# Patient Record
Sex: FEMALE | Race: WHITE | Employment: OTHER | URBAN - METROPOLITAN AREA
[De-identification: names, ages, dates, MRNs, and addresses within clinical notes are randomized per-mention and may not be internally consistent; named-entity substitution may affect disease eponyms.]

---

## 2017-01-13 ENCOUNTER — TELEPHONE (OUTPATIENT)
Dept: INTERNAL MEDICINE CLINIC | Age: 37
End: 2017-01-13

## 2017-01-13 RX ORDER — ARIPIPRAZOLE 15 MG/1
TABLET ORAL
Qty: 90 TABLET | Refills: 1 | Status: SHIPPED | OUTPATIENT
Start: 2017-01-13 | End: 2017-08-09 | Stop reason: SDUPTHER

## 2017-01-30 ENCOUNTER — OFFICE VISIT (OUTPATIENT)
Dept: INTERNAL MEDICINE CLINIC | Age: 37
End: 2017-01-30

## 2017-01-30 VITALS
DIASTOLIC BLOOD PRESSURE: 84 MMHG | BODY MASS INDEX: 43.16 KG/M2 | WEIGHT: 252.8 LBS | HEART RATE: 80 BPM | SYSTOLIC BLOOD PRESSURE: 120 MMHG | HEIGHT: 64 IN

## 2017-01-30 DIAGNOSIS — G44.029 CHRONIC CLUSTER HEADACHE, NOT INTRACTABLE: ICD-10-CM

## 2017-01-30 DIAGNOSIS — F31.76 BIPOLAR DISORDER, IN FULL REMISSION, MOST RECENT EPISODE DEPRESSED (HCC): ICD-10-CM

## 2017-01-30 DIAGNOSIS — R73.09 ABNORMAL GLUCOSE: ICD-10-CM

## 2017-01-30 DIAGNOSIS — I10 HTN (HYPERTENSION), BENIGN: Primary | ICD-10-CM

## 2017-01-30 PROCEDURE — 1036F TOBACCO NON-USER: CPT | Performed by: INTERNAL MEDICINE

## 2017-01-30 PROCEDURE — 99214 OFFICE O/P EST MOD 30 MIN: CPT | Performed by: INTERNAL MEDICINE

## 2017-01-30 PROCEDURE — G8427 DOCREV CUR MEDS BY ELIG CLIN: HCPCS | Performed by: INTERNAL MEDICINE

## 2017-01-30 PROCEDURE — G8419 CALC BMI OUT NRM PARAM NOF/U: HCPCS | Performed by: INTERNAL MEDICINE

## 2017-01-30 PROCEDURE — G8484 FLU IMMUNIZE NO ADMIN: HCPCS | Performed by: INTERNAL MEDICINE

## 2017-01-31 ENCOUNTER — TELEPHONE (OUTPATIENT)
Dept: INTERNAL MEDICINE CLINIC | Age: 37
End: 2017-01-31

## 2017-01-31 LAB
ESTIMATED AVERAGE GLUCOSE: 125.5 MG/DL
HBA1C MFR BLD: 6 %

## 2017-02-17 ENCOUNTER — OFFICE VISIT (OUTPATIENT)
Dept: INTERNAL MEDICINE CLINIC | Age: 37
End: 2017-02-17

## 2017-02-17 VITALS
BODY MASS INDEX: 43.29 KG/M2 | WEIGHT: 253.6 LBS | DIASTOLIC BLOOD PRESSURE: 82 MMHG | HEIGHT: 64 IN | SYSTOLIC BLOOD PRESSURE: 118 MMHG | HEART RATE: 64 BPM

## 2017-02-17 DIAGNOSIS — M54.50 ACUTE BILATERAL LOW BACK PAIN WITHOUT SCIATICA: Primary | ICD-10-CM

## 2017-02-17 LAB
BILIRUBIN URINE: NEGATIVE
BLOOD, URINE: NEGATIVE
CLARITY: CLEAR
COLOR: YELLOW
GLUCOSE URINE: NEGATIVE MG/DL
KETONES, URINE: NEGATIVE MG/DL
LEUKOCYTE ESTERASE, URINE: NEGATIVE
MICROSCOPIC EXAMINATION: NORMAL
NITRITE, URINE: NEGATIVE
PH UA: 5
PROTEIN UA: NEGATIVE MG/DL
SPECIFIC GRAVITY UA: 1.03
URINE REFLEX TO CULTURE: NORMAL
URINE TYPE: NORMAL
UROBILINOGEN, URINE: 0.2 E.U./DL

## 2017-02-17 PROCEDURE — G8484 FLU IMMUNIZE NO ADMIN: HCPCS | Performed by: NURSE PRACTITIONER

## 2017-02-17 PROCEDURE — G8417 CALC BMI ABV UP PARAM F/U: HCPCS | Performed by: NURSE PRACTITIONER

## 2017-02-17 PROCEDURE — 1036F TOBACCO NON-USER: CPT | Performed by: NURSE PRACTITIONER

## 2017-02-17 PROCEDURE — G8427 DOCREV CUR MEDS BY ELIG CLIN: HCPCS | Performed by: NURSE PRACTITIONER

## 2017-02-17 PROCEDURE — 99213 OFFICE O/P EST LOW 20 MIN: CPT | Performed by: NURSE PRACTITIONER

## 2017-02-17 RX ORDER — NAPROXEN 500 MG/1
500 TABLET ORAL 2 TIMES DAILY PRN
Qty: 60 TABLET | Refills: 0 | Status: SHIPPED | OUTPATIENT
Start: 2017-02-17 | End: 2017-03-19

## 2017-02-17 RX ORDER — CYCLOBENZAPRINE HCL 5 MG
5 TABLET ORAL NIGHTLY PRN
Qty: 10 TABLET | Refills: 0 | Status: SHIPPED | OUTPATIENT
Start: 2017-02-17 | End: 2017-02-27

## 2017-02-17 ASSESSMENT — ENCOUNTER SYMPTOMS
BACK PAIN: 1
GASTROINTESTINAL NEGATIVE: 1

## 2017-06-19 RX ORDER — METOPROLOL TARTRATE 50 MG/1
75 TABLET, FILM COATED ORAL 2 TIMES DAILY
Qty: 90 TABLET | Refills: 5 | Status: SHIPPED | OUTPATIENT
Start: 2017-06-19 | End: 2018-10-23 | Stop reason: ALTCHOICE

## 2018-03-27 RX ORDER — ARIPIPRAZOLE 15 MG/1
TABLET ORAL
Qty: 90 TABLET | Refills: 0 | OUTPATIENT
Start: 2018-03-27

## 2018-06-13 ENCOUNTER — OFFICE VISIT (OUTPATIENT)
Dept: ENT CLINIC | Age: 38
End: 2018-06-13

## 2018-06-13 VITALS — DIASTOLIC BLOOD PRESSURE: 84 MMHG | SYSTOLIC BLOOD PRESSURE: 120 MMHG

## 2018-06-13 DIAGNOSIS — H69.93 DISORDER OF BOTH EUSTACHIAN TUBES: ICD-10-CM

## 2018-06-13 DIAGNOSIS — K21.9 LARYNGOPHARYNGEAL REFLUX DISEASE: ICD-10-CM

## 2018-06-13 DIAGNOSIS — H60.8X3 CHRONIC ECZEMATOUS OTITIS EXTERNA OF BOTH EARS: Primary | ICD-10-CM

## 2018-06-13 DIAGNOSIS — J30.9 ALLERGIC SINUSITIS: ICD-10-CM

## 2018-06-13 PROCEDURE — 99203 OFFICE O/P NEW LOW 30 MIN: CPT | Performed by: OTOLARYNGOLOGY

## 2018-06-13 PROCEDURE — G8427 DOCREV CUR MEDS BY ELIG CLIN: HCPCS | Performed by: OTOLARYNGOLOGY

## 2018-06-13 PROCEDURE — 1036F TOBACCO NON-USER: CPT | Performed by: OTOLARYNGOLOGY

## 2018-06-13 PROCEDURE — G8421 BMI NOT CALCULATED: HCPCS | Performed by: OTOLARYNGOLOGY

## 2018-06-13 RX ORDER — VENLAFAXINE 75 MG/1
75 TABLET ORAL 2 TIMES DAILY
COMMUNITY
End: 2018-08-29

## 2018-06-13 RX ORDER — IPRATROPIUM BROMIDE AND ALBUTEROL SULFATE 2.5; .5 MG/3ML; MG/3ML
1 SOLUTION RESPIRATORY (INHALATION) EVERY 4 HOURS
COMMUNITY
End: 2018-10-23 | Stop reason: ALTCHOICE

## 2018-06-13 RX ORDER — OMEPRAZOLE 40 MG/1
40 CAPSULE, DELAYED RELEASE ORAL DAILY
Qty: 30 CAPSULE | Refills: 1 | Status: SHIPPED | OUTPATIENT
Start: 2018-06-13 | End: 2018-10-23 | Stop reason: ALTCHOICE

## 2018-06-13 RX ORDER — MONTELUKAST SODIUM 10 MG/1
10 TABLET ORAL NIGHTLY
Qty: 15 TABLET | Refills: 1 | Status: SHIPPED | OUTPATIENT
Start: 2018-06-13 | End: 2018-08-29

## 2018-06-13 RX ORDER — FLUTICASONE PROPIONATE 50 MCG
1 SPRAY, SUSPENSION (ML) NASAL DAILY
COMMUNITY
End: 2018-10-23 | Stop reason: ALTCHOICE

## 2018-06-13 RX ORDER — TRIAMCINOLONE ACETONIDE 1 MG/G
CREAM TOPICAL
Qty: 15 G | Refills: 0 | Status: SHIPPED | OUTPATIENT
Start: 2018-06-13 | End: 2018-08-29

## 2018-06-13 RX ORDER — AMITRIPTYLINE HYDROCHLORIDE 75 MG/1
75 TABLET, FILM COATED ORAL NIGHTLY
COMMUNITY
End: 2018-10-23 | Stop reason: ALTCHOICE

## 2018-06-13 RX ORDER — GUAIFENESIN 600 MG/1
1200 TABLET, EXTENDED RELEASE ORAL 2 TIMES DAILY
COMMUNITY
End: 2018-08-29

## 2018-06-13 RX ORDER — TRIAMCINOLONE ACETONIDE 1 MG/G
CREAM TOPICAL 2 TIMES DAILY
COMMUNITY
End: 2018-08-29

## 2018-06-13 RX ORDER — MELOXICAM 15 MG/1
15 TABLET ORAL DAILY
COMMUNITY
End: 2018-08-29

## 2018-06-13 RX ORDER — FERROUS SULFATE 325(65) MG
325 TABLET ORAL
COMMUNITY
End: 2018-10-23 | Stop reason: ALTCHOICE

## 2018-06-13 ASSESSMENT — ENCOUNTER SYMPTOMS
FACIAL SWELLING: 0
SINUS PRESSURE: 0
RHINORRHEA: 0
SORE THROAT: 1
APNEA: 1
SINUS PAIN: 0
VOICE CHANGE: 0
ALLERGIC/IMMUNOLOGIC NEGATIVE: 1
EYES NEGATIVE: 1
TROUBLE SWALLOWING: 0

## 2018-08-29 ENCOUNTER — OFFICE VISIT (OUTPATIENT)
Dept: FAMILY MEDICINE CLINIC | Age: 38
End: 2018-08-29

## 2018-08-29 VITALS
WEIGHT: 242.8 LBS | DIASTOLIC BLOOD PRESSURE: 80 MMHG | TEMPERATURE: 97.8 F | HEART RATE: 80 BPM | BODY MASS INDEX: 41.45 KG/M2 | HEIGHT: 64 IN | SYSTOLIC BLOOD PRESSURE: 124 MMHG | RESPIRATION RATE: 17 BRPM | OXYGEN SATURATION: 98 %

## 2018-08-29 DIAGNOSIS — F32.A ANXIETY AND DEPRESSION: Primary | ICD-10-CM

## 2018-08-29 DIAGNOSIS — F41.9 ANXIETY AND DEPRESSION: Primary | ICD-10-CM

## 2018-08-29 DIAGNOSIS — Z76.89 ENCOUNTER TO ESTABLISH CARE: ICD-10-CM

## 2018-08-29 DIAGNOSIS — M54.50 ACUTE BILATERAL LOW BACK PAIN WITHOUT SCIATICA: ICD-10-CM

## 2018-08-29 PROCEDURE — 99203 OFFICE O/P NEW LOW 30 MIN: CPT | Performed by: CLINICAL NURSE SPECIALIST

## 2018-08-29 PROCEDURE — G8417 CALC BMI ABV UP PARAM F/U: HCPCS | Performed by: CLINICAL NURSE SPECIALIST

## 2018-08-29 PROCEDURE — 1036F TOBACCO NON-USER: CPT | Performed by: CLINICAL NURSE SPECIALIST

## 2018-08-29 PROCEDURE — G8427 DOCREV CUR MEDS BY ELIG CLIN: HCPCS | Performed by: CLINICAL NURSE SPECIALIST

## 2018-08-29 RX ORDER — HYDROXYZINE PAMOATE 25 MG/1
25 CAPSULE ORAL 2 TIMES DAILY PRN
Qty: 30 CAPSULE | Refills: 0 | Status: SHIPPED | OUTPATIENT
Start: 2018-08-29 | End: 2018-09-28

## 2018-08-29 RX ORDER — NAPROXEN 500 MG/1
500 TABLET ORAL 2 TIMES DAILY WITH MEALS
Qty: 60 TABLET | Refills: 0 | Status: SHIPPED | OUTPATIENT
Start: 2018-08-29 | End: 2019-03-22

## 2018-08-29 RX ORDER — CYCLOBENZAPRINE HCL 5 MG
5 TABLET ORAL 2 TIMES DAILY PRN
Qty: 20 TABLET | Refills: 0 | Status: SHIPPED | OUTPATIENT
Start: 2018-08-29 | End: 2018-09-08

## 2018-08-29 ASSESSMENT — ENCOUNTER SYMPTOMS: BACK PAIN: 1

## 2018-08-29 NOTE — PROGRESS NOTES
Smoker    Smokeless tobacco: Never Used    Alcohol use No    Drug use: No    Sexual activity: No     Other Topics Concern    Not on file     Social History Narrative    No narrative on file       Review of Systems   Constitutional: Negative for chills, fever, unexpected weight change and weight loss. Eyes: Negative for visual disturbance. Respiratory: Negative for cough, chest tightness, shortness of breath and wheezing. Cardiovascular: Negative for chest pain and palpitations. Gastrointestinal: Negative for abdominal pain, bowel incontinence, diarrhea, nausea, rectal pain and vomiting. Endocrine: Negative for polydipsia, polyphagia and polyuria. Genitourinary: Negative for bladder incontinence, dysuria and pelvic pain. Musculoskeletal: Positive for back pain. Skin: Negative for rash. Neurological: Negative for tingling, weakness, numbness, headaches and paresthesias. Psychiatric/Behavioral: Positive for dysphoric mood. Negative for self-injury, sleep disturbance and suicidal ideas. The patient is nervous/anxious. OBJECTIVE:    Physical Exam   Constitutional: She is oriented to person, place, and time. She appears well-developed and well-nourished. No distress. HENT:   Head: Normocephalic and atraumatic. Right Ear: External ear normal.   Left Ear: External ear normal.   Nose: Nose normal.   Eyes: Pupils are equal, round, and reactive to light. Conjunctivae are normal.   Neck: Normal range of motion. Neck supple. No tracheal deviation present. Cardiovascular: Normal rate, regular rhythm and normal heart sounds. Pulmonary/Chest: Effort normal. No respiratory distress. She has no wheezes. She has no rales. She exhibits no tenderness. Abdominal: Soft. Bowel sounds are normal. She exhibits no distension and no mass. There is no tenderness. There is no rebound and no guarding. Musculoskeletal: Normal range of motion. She exhibits no edema.    Slight decreased ROM due to discomfort, able to walk on heels and toes, negative straight leg raise, no spinal tenderness with palpation   Neurological: She is alert and oriented to person, place, and time. Skin: Skin is warm and dry. No rash noted. Psychiatric: She has a normal mood and affect. Her behavior is normal.   Nursing note and vitals reviewed. /80   Pulse 80   Temp 97.8 °F (36.6 °C)   Resp 17   Ht 5' 4\" (1.626 m)   Wt 242 lb 12.8 oz (110.1 kg)   LMP 05/29/2018   SpO2 98%   BMI 41.68 kg/m²    BP Readings from Last 3 Encounters:   08/29/18 124/80   06/13/18 120/84   02/17/17 118/82      Wt Readings from Last 3 Encounters:   08/29/18 242 lb 12.8 oz (110.1 kg)   02/17/17 253 lb 9.6 oz (115 kg)   01/30/17 252 lb 12.8 oz (114.7 kg)       ASSESSMENT & PLAN:    1. Anxiety and depression  - hydrOXYzine (VISTARIL) 25 MG capsule; Take 1 capsule by mouth 2 times daily as needed for Anxiety  Dispense: 30 capsule; Refill: 0 (may cause drowsiness)   - Ambulatory referral to Psychiatry    2. Acute bilateral low back pain without sciatica  - naproxen (NAPROSYN) 500 MG tablet; Take 1 tablet by mouth 2 times daily (with meals)  Dispense: 60 tablet; Refill: 0  - Ambulatory referral to Physical Therapy  - XR LUMBAR SPINE (MIN 4 VIEWS); Future  - cyclobenzaprine (FLEXERIL) 5 MG tablet; Take 1 tablet by mouth 2 times daily as needed for Muscle spasms  Dispense: 20 tablet; Refill: 0 (may cause drowsiness)  - Naproxen twice a day, take with food. - No Aleve, Advil, Ibuprofen, Motrin or aspirin while taking the naproxen. - Watch for GI symptoms (heart burn, indigestion, epigastric pain or blood in stool)  - Stretches/exercises given. - Heat or ice, whichever feels better. 3. Encounter to establish care  - Follow up as scheduled with [facility] as scheduled on 9/12/18      Continue current treatment plan.     Current Outpatient Prescriptions   Medication Sig Dispense Refill    hydrOXYzine (VISTARIL) 25 MG capsule Take 1 capsule by mouth 2 times daily as needed for Anxiety 30 capsule 0    naproxen (NAPROSYN) 500 MG tablet Take 1 tablet by mouth 2 times daily (with meals) 60 tablet 0    cyclobenzaprine (FLEXERIL) 5 MG tablet Take 1 tablet by mouth 2 times daily as needed for Muscle spasms 20 tablet 0    amitriptyline (ELAVIL) 75 MG tablet Take 75 mg by mouth nightly      metoprolol tartrate (LOPRESSOR) 50 MG tablet Take 1.5 tablets by mouth 2 times daily 90 tablet 5    albuterol sulfate HFA (PROVENTIL HFA) 108 (90 BASE) MCG/ACT inhaler Inhale 2 puffs into the lungs every 4 hours as needed for Wheezing 1 Inhaler 5    bisacodyl (DULCOLAX) 5 MG EC tablet Take 5 mg by mouth daily as needed for Constipation      ferrous sulfate 325 (65 Fe) MG tablet Take 325 mg by mouth daily (with breakfast)      fluticasone (FLONASE) 50 MCG/ACT nasal spray 1 spray by Nasal route daily      ipratropium-albuterol (DUONEB) 0.5-2.5 (3) MG/3ML SOLN nebulizer solution Inhale 1 vial into the lungs every 4 hours      omeprazole (PRILOSEC) 40 MG delayed release capsule Take 1 capsule by mouth daily Take tablet one hour before evening meal 30 capsule 1     No current facility-administered medications for this visit. Return in about 2 weeks (around 9/12/2018), or if symptoms worsen or fail to improve, for back pain, anxiety, depression, establish care. [place] received counseling on the following healthy behaviors: nutrition, exercise and medication adherence    Discussed use, benefit, and side effects of prescribed medications. Barriers to medication compliance addressed. All patient questions answered. Pt voiced understanding. Call office if experience side effects from medications. Please note that some or all of this record was generated using voice recognition software. If there are any questions about the content of this document, please contact the author as some errors in transcription may have occurred.

## 2018-08-29 NOTE — PATIENT INSTRUCTIONS
Continue current current treatment plan    Vistaril 25 mg twice a day as needed for anxiety, may cause drowsiness    Referral to psychiatry     Lumbar  X ray    Referral to physical therapy    Naproxen twice a day, take with food. No Aleve, Advil, Ibuprofen, Motrin or aspirin while taking the naproxen. Watch for GI symptoms (heart burn, indigestion, epigastric pain or blood in stool)    Stretches/exercises given. Heat or ice, whichever feels better. Flexeril twice a day as needed for muscle spasms, may cause drowsiness    Follow up as scheduled with Zonia Rivera as scheduled on 9/12/18    Patient Education        Anxiety Disorder: Care Instructions  Your Care Instructions    Anxiety is a normal reaction to stress. Difficult situations can cause you to have symptoms such as sweaty palms and a nervous feeling. In an anxiety disorder, the symptoms are far more severe. Constant worry, muscle tension, trouble sleeping, nausea and diarrhea, and other symptoms can make normal daily activities difficult or impossible. These symptoms may occur for no reason, and they can affect your work, school, or social life. Medicines, counseling, and self-care can all help. Follow-up care is a key part of your treatment and safety. Be sure to make and go to all appointments, and call your doctor if you are having problems. It's also a good idea to know your test results and keep a list of the medicines you take. How can you care for yourself at home? · Take medicines exactly as directed. Call your doctor if you think you are having a problem with your medicine. · Go to your counseling sessions and follow-up appointments. · Recognize and accept your anxiety. Then, when you are in a situation that makes you anxious, say to yourself, \"This is not an emergency. I feel uncomfortable, but I am not in danger. I can keep going even if I feel anxious. \"  · Be kind to your body:  ¨ Relieve tension with exercise or a massage. ¨ Get enough rest.  ¨ Avoid alcohol, caffeine, nicotine, and illegal drugs. They can increase your anxiety level and cause sleep problems. ¨ Learn and do relaxation techniques. See below for more about these techniques. · Engage your mind. Get out and do something you enjoy. Go to a funny movie, or take a walk or hike. Plan your day. Having too much or too little to do can make you anxious. · Keep a record of your symptoms. Discuss your fears with a good friend or family member, or join a support group for people with similar problems. Talking to others sometimes relieves stress. · Get involved in social groups, or volunteer to help others. Being alone sometimes makes things seem worse than they are. · Get at least 30 minutes of exercise on most days of the week to relieve stress. Walking is a good choice. You also may want to do other activities, such as running, swimming, cycling, or playing tennis or team sports. Relaxation techniques  Do relaxation exercises 10 to 20 minutes a day. You can play soothing, relaxing music while you do them, if you wish. · Tell others in your house that you are going to do your relaxation exercises. Ask them not to disturb you. · Find a comfortable place, away from all distractions and noise. · Lie down on your back, or sit with your back straight. · Focus on your breathing. Make it slow and steady. · Breathe in through your nose. Breathe out through either your nose or mouth. · Breathe deeply, filling up the area between your navel and your rib cage. Breathe so that your belly goes up and down. · Do not hold your breath. · Breathe like this for 5 to 10 minutes. Notice the feeling of calmness throughout your whole body. As you continue to breathe slowly and deeply, relax by doing the following for another 5 to 10 minutes:  · Tighten and relax each muscle group in your body. You can begin at your toes and work your way up to your head.   · Imagine your muscle instructions adapted under license by [facility] ([facility]). If you have questions about a medical condition or this instruction, always ask your healthcare professional. [place] [address] any warranty or liability for your use of this information. Patient Education        Learning About How to Have a Healthy Back  What causes back pain? Back pain is often caused by overuse, strain, or injury. For example, people often hurt their backs playing sports or working in the yard, being jolted in a car accident, or lifting something too heavy. Aging plays a part too. Your bones and muscles tend to lose strength as you age, which makes injury more likely. The spongy discs between the bones of the spine (vertebrae) may suffer from wear and tear and no longer provide enough cushion between the bones. A disc that bulges or breaks open (herniated disc) can press on nerves, causing back pain. In some people, back pain is the result of arthritis, broken vertebrae caused by bone loss (osteoporosis), illness, or a spine problem. Although most people have back pain at one time or another, there are steps you can take to make it less likely. How can you have a healthy back? Reduce stress on your back through good posture  Slumping or slouching alone may not cause low back pain. But after the back has been strained or injured, bad posture can make pain worse. · Sleep in a position that maintains your back's normal curves and on a mattress that feels comfortable. Sleep on your side with a pillow between your knees, or sleep on your back with a pillow under your knees. These positions can reduce strain on your back. · Stand and sit up straight. \"Good posture\" generally means your ears, shoulders, and hips are in a straight line. · If you must stand for a long time, put one foot on a stool, ledge, or box. Switch feet every now and then.   · Sit in a chair that is low enough to let you place both feet flat on the floor with both knees nearly level with your hips. If your chair or desk is too high, use a footrest to raise your knees. Place a small pillow, a rolled-up towel, or a lumbar roll in the curve of your back if you need extra support. · Try a kneeling chair, which helps tilt your hips forward. This takes pressure off your lower back. · Try sitting on an exercise ball. It can rock from side to side, which helps keep your back loose. · When driving, keep your knees nearly level with your hips. Sit straight, and drive with both hands on the steering wheel. Your arms should be in a slightly bent position. Reduce stress on your back through careful lifting  · Squat down, bending at the hips and knees only. If you need to, put one knee to the floor and extend your other knee in front of you, bent at a right angle (half kneeling). · Press your chest straight forward. This helps keep your upper back straight while keeping a slight arch in your low back. · Hold the load as close to your body as possible, at the level of your belly button (navel). · Use your feet to change direction, taking small steps. · Lead with your hips as you change direction. Keep your shoulders in line with your hips as you move. · Set down your load carefully, squatting with your knees and hips only. Exercise and stretch your back  · Do some exercise on most days of the week, if your doctor says it is okay. You can walk, run, swim, or cycle. · Stretch your back muscles. Here are a few exercises to try:  Roma Lit on your back, and gently pull one bent knee to your chest. Put that foot back on the floor, and then pull the other knee to your chest.  ¨ Do pelvic tilts. Lie on your back with your knees bent. Tighten your stomach muscles. Pull your belly button (navel) in and up toward your ribs. You should feel like your back is pressing to the floor and your hips and pelvis are slightly lifting off the floor.  Hold for 6 seconds while breathing smoothly. ¨ Sit with your back flat against a wall. · Keep your core muscles strong. The muscles of your back, belly (abdomen), and buttocks support your spine. ¨ Pull in your belly and imagine pulling your navel toward your spine. Hold this for 6 seconds, then relax. Remember to keep breathing normally as you tense your muscles. ¨ Do curl-ups. Always do them with your knees bent. Keep your low back on the floor, and curl your shoulders toward your knees using a smooth, slow motion. Keep your arms folded across your chest. If this bothers your neck, try putting your hands behind your neck (not your head), with your elbows spread apart. ¨ Lie on your back with your knees bent and your feet flat on the floor. Tighten your belly muscles, and then push with your feet and raise your buttocks up a few inches. Hold this position 6 seconds as you continue to breathe normally, then lower yourself slowly to the floor. Repeat 8 to 12 times. ¨ If you like group exercise, try Pilates or yoga. These classes have poses that strengthen the core muscles. Lead a healthy lifestyle  · Stay at a healthy weight to avoid strain on your back. · Do not smoke. Smoking increases the risk of osteoporosis, which weakens the spine. If you need help quitting, talk to your doctor about stop-smoking programs and medicines. These can increase your chances of quitting for good. Where can you learn more? Go to https://Appformatoyaeb.Ali. org and sign in to your [employer] account. Enter L315 in the Butter box to learn more about \"Learning About How to Have a Healthy Back. \"     If you do not have an account, please click on the \"Sign Up Now\" link. Current as of: November 29, 2017  Content Version: 11.7  © 9885-4446 [employer]. Care instructions adapted under license by [facility] ([facility]).  If you have questions about a medical condition or this instruction, always ask your healthcare professional. [employer] disclaims any warranty or liability for your use of this information. Patient Education        Back Pain: Care Instructions  Your Care Instructions    Back pain has many possible causes. It is often related to problems with muscles and ligaments of the back. It may also be related to problems with the nerves, discs, or bones of the back. Moving, lifting, standing, sitting, or sleeping in an awkward way can strain the back. Sometimes you don't notice the injury until later. Arthritis is another common cause of back pain. Although it may hurt a lot, back pain usually improves on its own within several weeks. Most people recover in 12 weeks or less. Using good home treatment and being careful not to stress your back can help you feel better sooner. Follow-up care is a key part of your treatment and safety. Be sure to make and go to all appointments, and call your doctor if you are having problems. It's also a good idea to know your test results and keep a list of the medicines you take. How can you care for yourself at home? · Sit or lie in positions that are most comfortable and reduce your pain. Try one of these positions when you lie down:  ¨ Lie on your back with your knees bent and supported by large pillows. ¨ Lie on the floor with your legs on the seat of a sofa or chair. [place] Elder on your side with your knees and hips bent and a pillow between your legs. ¨ Lie on your stomach if it does not make pain worse. · Do not sit up in bed, and avoid soft couches and twisted positions. Bed rest can help relieve pain at first, but it delays healing. Avoid bed rest after the first day of back pain. · Change positions every 30 minutes. If you must sit for long periods of time, take breaks from sitting. Get up and walk around, or lie in a comfortable position. · Try using a heating pad on a low or medium setting for 15 to 20 minutes every 2 or 3 hours.  Try a warm shower in place of one session with the heating pad. · You can also try an ice pack for 10 to 15 minutes every 2 to 3 hours. Put a thin cloth between the ice pack and your skin. · Take pain medicines exactly as directed. ¨ If the doctor gave you a prescription medicine for pain, take it as prescribed. ¨ If you are not taking a prescription pain medicine, ask your doctor if you can take an over-the-counter medicine. · Take short walks several times a day. You can start with 5 to 10 minutes, 3 or 4 times a day, and work up to longer walks. Walk on level surfaces and avoid hills and stairs until your back is better. · Return to work and other activities as soon as you can. Continued rest without activity is usually not good for your back. · To prevent future back pain, do exercises to stretch and strengthen your back and stomach. Learn how to use good posture, safe lifting techniques, and proper body mechanics. When should you call for help? Call your doctor now or seek immediate medical care if:    · You have new or worsening numbness in your legs.     · You have new or worsening weakness in your legs. (This could make it hard to stand up.)     · You lose control of your bladder or bowels.    Watch closely for changes in your health, and be sure to contact your doctor if:    · You have a fever, lose weight, or don't feel well.     · You do not get better as expected. Where can you learn more? Go to https://MentorDOTMepeFrolik.Miralupa. org and sign in to your [employer] account. Enter P058 in the [facility] box to learn more about \"Back Pain: Care Instructions. \"     If you do not have an account, please click on the \"Sign Up Now\" link. Current as of: November 29, 2017  Content Version: 11.7  © 4400-2497 [employer]. Care instructions adapted under license by [facility] ([facility]).  If you have questions about a medical condition or this instruction, always ask your healthcare professional. Shanna Palacio any don't forget to keep walking. · Do self-massage. You can use self-massage to unwind after work or school or to energize yourself in the morning. You can easily massage your feet, hands, or neck. Self-massage works best if you are in comfortable clothes and are sitting or lying in a comfortable position. Use oil or lotion to massage bare skin. · Reduce stress. Back pain can lead to a vicious [place]: Distress about the pain tenses the muscles in your back, which in turn causes more pain. Learn how to relax your mind and your muscles to lower your stress. Where can you learn more? Go to https://chpepiceweb.Odyssey Thera. org and sign in to your [employer] account. Enter S198 in the [employer] box to learn more about \"Learning About Relief for Back Pain. \"     If you do not have an account, please click on the \"Sign Up Now\" link. Current as of: November 29, 2017  Content Version: 11.7  © 6267-6824 [employer]. Care instructions adapted under license by [facility] ([facility]). If you have questions about a medical condition or this instruction, always ask your healthcare professional. [place] [address] any warranty or liability for your use of this information. Patient Education        Back Care and Preventing Injuries: Care Instructions  Your Care Instructions    You can hurt your back doing many everyday activities: lifting a heavy box, bending down to garden, exercising at the gym, and even getting out of bed. But you can keep your back strong and healthy by doing some exercises. You also can follow a few tips for sitting, sleeping, and lifting to avoid hurting your back again. Talk to your doctor before you start an exercise program. Ask for help if you want to learn more about keeping your back healthy. Follow-up care is a key part of your treatment and safety. Be sure to make and go to all appointments, and call your doctor if you are having problems.  It's also a good (abdomen), and buttocks support your spine. ¨ Pull in your belly, and imagine pulling your navel toward your spine. Hold this for 6 seconds, then relax. Remember to keep breathing normally as you tense your muscles. ¨ Do curl-ups. Always do them with your knees bent. Keep your low back on the floor, and curl your shoulders toward your knees using a smooth, slow motion. Keep your arms folded across your chest. If this bothers your neck, try putting your hands behind your neck (not your head), with your elbows spread apart. ¨ Lie on your back with your knees bent and your feet flat on the floor. Tighten your belly muscles, and then push with your feet and raise your buttocks up a few inches. Hold this position 6 seconds as you continue to breathe normally, then lower yourself slowly to the floor. Repeat 8 to 12 times. ¨ If you like group exercise, try Pilates or yoga. These classes have poses that strengthen the core muscles. Protect your back when you sit  · Place a small pillow, a rolled-up towel, or a lumbar roll in the curve of your back if you need extra support. · Sit in a chair that is low enough to let you place both feet flat on the floor with both knees nearly level with your hips. If your chair or desk is too high, use a foot rest to raise your knees. · When driving, keep your knees nearly level with your hips. Sit straight, and drive with both hands on the steering wheel. Your arms should be in a slightly bent position. · Try a kneeling chair, which helps tilt your hips forward. This takes pressure off your lower back. · Try sitting on an exercise ball. It can rock from side to side, which helps keep your back loose. Lift properly  · Squat down, bending at the hips and knees only. If you need to, put one knee to the floor and extend your other knee in front of you, bent at a right angle (half kneeling). · Press your chest straight forward.  This helps keep your upper back straight while keeping a good idea to know your test results and keep a list of the medicines you take. How can you care for yourself at home? Watch for symptoms of depression  The symptoms of depression are often subtle at first. You may think they are caused by your disease rather than depression. Or you may think it is normal to be depressed when you have a chronic disease. If you are depressed you may:  · Feel sad or hopeless. · Feel guilty or worthless. · Not enjoy the things you used to enjoy. · Feel hopeless, as though life is not worth living. · Have trouble thinking or remembering. · Have low energy, and you may not eat or sleep well. · Pull away from others. · Think often about death or killing yourself. (Keep the numbers for these national suicide hotlines: 4-714-412-TALK [1-702.461.5159] and 8-733-LETDIFH [1-956.872.4831]. )  Get treatment  By treating your depression, you can feel more hopeful and have more energy. If you feel better, you may take better care of yourself, so your health may improve. · Talk to your doctor if you have any changes in mood during treatment for your disease. · Ask your doctor for help. Counseling, antidepressant medicine, or a combination of the two can help most people with depression. Often a combination works best. Counseling can also help you cope with having a chronic disease. When should you call for help? Call 911 anytime you think you may need emergency care. For example, call if:    · You feel like hurting yourself or someone else.     · Someone you know has depression and is about to attempt or is attempting suicide.   [facility] your doctor now or seek immediate medical care if:    · You hear voices.     · Someone you know has depression and:  ¨ Starts to give away his or her possessions. ¨ Uses illegal drugs or drinks alcohol heavily. ¨ Talks or writes about death, including writing suicide notes or talking about guns, knives, or pills.   ¨ Starts to spend a lot of time alone.  ¨ Acts very aggressively or suddenly appears calm.    Watch closely for changes in your health, and be sure to contact your doctor if:    · You do not get better as expected. Where can you learn more? Go to https://carloz.Spireon. org and sign in to your [employer] account. Enter Z539 in the PlayPhone box to learn more about \"Depression and Chronic Disease: Care Instructions. \"     If you do not have an account, please click on the \"Sign Up Now\" link. Current as of: December 7, 2017  Content Version: 11.7  © 8897-5674 [employer]. Care instructions adapted under license by [facility] ([facility]). If you have questions about a medical condition or this instruction, always ask your healthcare professional. [address] any warranty or liability for your use of this information. Patient Education        Recovering From Depression: Care Instructions  Your Care Instructions    Taking good care of yourself is important as you recover from depression. In time, your symptoms will fade as your treatment takes hold. Do not give up. Instead, focus your energy on getting better. Your mood will improve. It just takes some time. Focus on things that can help you feel better, such as being with friends and family, eating well, and getting enough rest. But take things slowly. Do not do too much too soon. You will begin to feel better gradually. Follow-up care is a key part of your treatment and safety. Be sure to make and go to all appointments, and call your doctor if you are having problems. It's also a good idea to know your test results and keep a list of the medicines you take. How can you care for yourself at home? Be realistic  · If you have a large task to do, break it up into smaller steps you can handle, and just do what you can. · You may want to put off important decisions until your depression has lifted.  If you have plans that will have a major Where can you learn more? Go to https://chpepiceweb.dev9k. org and sign in to your [employer] account. Enter Y078 in the DuraFizz box to learn more about \"Recovering From Depression: Care Instructions. \"     If you do not have an account, please click on the \"Sign Up Now\" link. Current as of: December 7, 2017  Content Version: 11.7  © [id number] [employer]. Care instructions adapted under license by [facility] ([facility]). If you have questions about a medical condition or this instruction, always ask your healthcare professional. [place] [address] any warranty or liability for your use of this information. Patient Education        Depression Treatment: Care Instructions  Your Care Instructions    Depression is a condition that affects the way you feel, think, and act. It causes symptoms such as low energy, loss of interest in daily activities, and sadness or grouchiness that goes on for a long time. Depression is very common and affects men and women of all ages. Depression is a medical illness caused by changes in the natural chemicals in your brain. It is not a character flaw, and it does not mean that you are a bad or weak person. It does not mean that you are going crazy. It is important to know that depression can be treated. Medicines, counseling, and self-care can all help. Many people do not get help because they are embarrassed or think that they will get over the depression on their own. But some people do not get better without treatment. Follow-up care is a key part of your treatment and safety. Be sure to make and go to all appointments, and call your doctor if you are having problems. It's also a good idea to know your test results and keep a list of the medicines you take. How can you care for yourself at home? Learn about antidepressant medicines  Antidepressant medicines can improve or end the symptoms of depression.  You may need to take the medicine for at least 6 months, and often longer. Keep taking your medicine even if you feel better. If you stop taking it too soon, your symptoms may come back or get worse. You may start to feel better within 1 to 3 weeks of taking antidepressant medicine. But it can take as many as 6 to 8 weeks to see more improvement. Talk to your doctor if you have problems with your medicine or if you do not notice any improvement after 3 weeks. Antidepressants can make you feel tired, dizzy, or nervous. Some people have dry mouth, constipation, headaches, sexual problems, an upset stomach, or diarrhea. Many of these side effects are mild and go away on their own after you take the medicine for a few weeks. Some may last longer. Talk to your doctor if side effects bother you too much. You might be able to try a different medicine. If you are pregnant or breastfeeding, talk to your doctor about what medicines you can take. Learn about counseling  In many cases, counseling can work as well as medicines to treat mild to moderate depression. Counseling is done by licensed mental health providers, such as psychologists, social workers, and some types of nurses. It can be done in one-on-one sessions or in a group setting. Many people find group sessions helpful. Cognitive-behavioral therapy is a type of counseling. In this treatment therapy, you learn how to see and change unhelpful thinking styles that may be adding to your depression. Counseling and medicines often work well when used together. To manage depression  · Be physically active. Getting 30 minutes of exercise each day is good for your body and your mind. Begin slowly if it is hard for you to get started. If you already exercise, keep it up. · Plan something pleasant for yourself every day. Include activities that you have enjoyed in the past.  · Get enough sleep. Talk to your doctor if you have problems sleeping. · Eat a balanced diet.  If you do not feel hungry, secure with your leg raised, try raising the opposite arm straight out in front of you at the same time. Knee-to-chest exercise    1. Lie on your back with your knees bent and your feet flat on the floor. 2. Bring one knee to your chest, keeping the other foot flat on the floor (or keeping the other leg straight, whichever feels better on your lower back). 3. Keep your lower back pressed to the floor. Hold for at least 15 to 30 seconds. 4. Relax, and lower the knee to the starting position. 5. Repeat with the other leg. Repeat 2 to 4 times with each leg. 6. To get more stretch, put your other leg flat on the floor while pulling your knee to your chest.  Curl-ups    1. Lie on the floor on your back with your knees bent at a 90-degree angle. Your feet should be flat on the floor, about 12 inches from your buttocks. 2. Cross your arms over your chest. If this bothers your neck, try putting your hands behind your neck (not your head), with your elbows spread apart. 3. Slowly tighten your belly muscles and raise your shoulder blades off the floor. 4. Keep your head in line with your body, and do not press your chin to your chest.  5. Hold this position for 1 or 2 seconds, then slowly lower yourself back down to the floor. 6. Repeat 8 to 12 times. Pelvic tilt exercise    1. Lie on your back with your knees bent. 2. \"Brace\" your stomach. This means to tighten your muscles by pulling in and imagining your belly button moving toward your spine. You should feel like your back is pressing to the floor and your hips and pelvis are rocking back. 3. Hold for about 6 seconds while you breathe smoothly. 4. Repeat 8 to 12 times. Heel dig bridging    1. Lie on your back with both knees bent and your ankles bent so that only your heels are digging into the floor. Your knees should be bent about 90 degrees.   2. Then push your heels into the floor, squeeze your buttocks, and lift your hips off the floor until your shoulders, hips, and knees are all in a straight line. 3. Hold for about 6 seconds as you continue to breathe normally, and then slowly lower your hips back down to the floor and rest for up to 10 seconds. 4. Do 8 to 12 repetitions. Hamstring stretch in doorway    1. Lie on your back in a doorway, with one leg through the open door. 2. Slide your leg up the wall to straighten your knee. You should feel a gentle stretch down the back of your leg. 3. Hold the stretch for at least 15 to 30 seconds. Do not arch your back, point your toes, or bend either knee. Keep one heel touching the floor and the other heel touching the wall. 4. Repeat with your other leg. 5. Do 2 to 4 times for each leg. Hip flexor stretch    1. Kneel on the floor with one knee bent and one leg behind you. Place your forward knee over your foot. Keep your other knee touching the floor. 2. Slowly push your hips forward until you feel a stretch in the upper thigh of your rear leg. 3. Hold the stretch for at least 15 to 30 seconds. Repeat with your other leg. 4. Do 2 to 4 times on each side. Wall sit    1. Stand with your back 10 to 12 inches away from a wall. 2. Lean into the wall until your back is flat against it. 3. Slowly slide down until your knees are slightly bent, pressing your lower back into the wall. 4. Hold for about 6 seconds, then slide back up the wall. 5. Repeat 8 to 12 times. Follow-up care is a key part of your treatment and safety. Be sure to make and go to all appointments, and call your doctor if you are having problems. It's also a good idea to know your test results and keep a list of the medicines you take. Where can you learn more? Go to https://Enliven Marketing TechnologiespeEpicrisis.Interplay Entertainment. org and sign in to your T-ZONE account. Enter H884 in the Clearhaus box to learn more about \"Low Back Pain: Exercises. \"     If you do not have an account, please click on the \"Sign Up Now\" link.   Current as of: November medicine in your back (especially for pain that involves your legs). If you have chronic low back pain, treatment will help you understand and manage your pain. Treatment may include:  · Staying active. This may include walking or doing back exercises. · Physical therapy. · Medicines. Some of these medicines are also used for other problems, like depression. · Pain management. Your doctor may have you see a pain specialist.  · Counseling. Having chronic pain can be hard. It may help to talk to someone who can help you cope with your pain. Surgery isn't needed for most people. But it may help some types of low back pain. Follow-up care is a key part of your treatment and safety. Be sure to make and go to all appointments, and call your doctor if you are having problems. It's also a good idea to know your test results and keep a list of the medicines you take. When should you call for help? Call 911 anytime you think you may need emergency care. For example, call if:  · You can't move a leg at all. Call your doctor now or seek immediate medical care if:  · You have new or worse symptoms in your legs, belly, or buttocks. Symptoms may include:  ¨ Numbness or tingling. ¨ Weakness. ¨ Pain. · You lose bladder or bowel control. Watch closely for changes in your health, and be sure to contact your doctor if:  · Along with the back pain, you have a fever, lose weight, or don't feel well. · You do not get better as expected. Where can you learn more? Go to https://Patient Communicatorchaparrita.Incident Technologies. org and sign in to your [employer] account. Enter A007 in the The Luxe Nomad box to learn more about \"Learning About Low Back Pain. \"     If you do not have an account, please click on the \"Sign Up Now\" link. Current as of: November 29, 2017  Content Version: 11.7  © 1133-5450 Level Chef, Incorporated. Care instructions adapted under license by [facility] ([facility]).  If you have questions about a medical condition or this Adar IT account. Enter Z667 in the [facility] box to learn more about \"Acute Low Back Pain: Exercises. \"     If you do not have an account, please click on the \"Sign Up Now\" link. Current as of: November 29, 2017  Content Version: 11.7  © 2731-4601 Vaxart. Care instructions adapted under license by [facility] ([facility]). If you have questions about a medical condition or this instruction, always ask your healthcare professional. [address] any warranty or liability for your use of this information. Patient Education        Getting Back to Normal After Low Back Pain: Care Instructions  Your Care Instructions  Almost everyone has low back pain at some time. The good news is that most low back pain will go away in a few days or weeks with some basic self-care. Some people are afraid that doing too much may make their pain worse. In the past, people stayed in bed, thinking this would help their backs. Now doctors think that, in most cases, getting back to your normal activities is good for your back, as long as you avoid doing things that make your pain worse. Follow-up care is a key part of your treatment and safety. Be sure to make and go to all appointments, and call your doctor if you are having problems. It's also a good idea to know your test results and keep a list of the medicines you take. How can you care for yourself at home? Ease back into daily activities  · For the first day or two of pain, take it easy. But as soon as possible, get back to your normal daily life and activities. · Get gentle exercise, such as walking. Movement keeps your spine flexible and helps your muscles stay strong. · If you are an athlete, return to your activity carefully. Choose a low-impact option until your pain is under control. Avoid or change activities that cause pain  · Try to avoid too much bending, heavy lifting, or reaching.  These movements put extra stress on at all.   [facility] your doctor now or seek immediate medical care if:    · You have new or worse symptoms in your legs, belly, or buttocks. Symptoms may include:  ¨ Numbness or tingling. ¨ Weakness. ¨ Pain.     · You lose bladder or bowel control.    Watch closely for changes in your health, and be sure to contact your doctor if:    · You have a fever, lose weight, or don't feel well.     · You are not getting better as expected. Where can you learn more? Go to https://MYFLYpeDotAlign.TweetMeme. org and sign in to your LocalCircles account. Enter I581 in the TinyBytes box to learn more about \"Getting Back to Normal After Low Back Pain: Care Instructions. \"     If you do not have an account, please click on the \"Sign Up Now\" link. Current as of: November 29, 2017  Content Version: 11.7  © 4969-9304 Breakout Commerce, Zenverge. Care instructions adapted under license by [facility] [address] If you have questions about a medical condition or this instruction, always ask your healthcare professional. [place] [address] any warranty or liability for your use of this information.

## 2018-08-30 ASSESSMENT — ENCOUNTER SYMPTOMS
ABDOMINAL PAIN: 0
BOWEL INCONTINENCE: 0
COUGH: 0
VOMITING: 0
DIARRHEA: 0
NAUSEA: 0
WHEEZING: 0
RECTAL PAIN: 0
SHORTNESS OF BREATH: 0
CHEST TIGHTNESS: 0

## 2018-10-01 ENCOUNTER — TELEPHONE (OUTPATIENT)
Dept: INTERNAL MEDICINE CLINIC | Age: 38
End: 2018-10-01

## 2018-10-01 NOTE — TELEPHONE ENCOUNTER
Received phone call from central [facility]. Patient was a patient of . She was last seen in this office by Esperanza Mckinney on 2/17/17. She established care with Sharita Ladd on 8/29/18. Had a NP appointment with Dr. Marielena Santiago on 9/12/18 and she NS. Asking if Lata More would want to see her again. She says that she had to move to take care of her sick sister but now she has moved back to this area.

## 2018-10-23 ENCOUNTER — OFFICE VISIT (OUTPATIENT)
Dept: FAMILY MEDICINE CLINIC | Age: 38
End: 2018-10-23
Payer: MEDICARE

## 2018-10-23 VITALS
SYSTOLIC BLOOD PRESSURE: 124 MMHG | WEIGHT: 241.4 LBS | BODY MASS INDEX: 47.39 KG/M2 | RESPIRATION RATE: 20 BRPM | TEMPERATURE: 98.1 F | HEART RATE: 72 BPM | DIASTOLIC BLOOD PRESSURE: 80 MMHG | OXYGEN SATURATION: 98 % | HEIGHT: 60 IN

## 2018-10-23 DIAGNOSIS — R20.2 PARESTHESIA OF LEFT ARM: ICD-10-CM

## 2018-10-23 DIAGNOSIS — G43.909 MIGRAINE WITHOUT STATUS MIGRAINOSUS, NOT INTRACTABLE, UNSPECIFIED MIGRAINE TYPE: ICD-10-CM

## 2018-10-23 DIAGNOSIS — G56.01 CARPAL TUNNEL SYNDROME OF RIGHT WRIST: Primary | ICD-10-CM

## 2018-10-23 DIAGNOSIS — F31.9 BIPOLAR AFFECTIVE DISORDER, REMISSION STATUS UNSPECIFIED (HCC): ICD-10-CM

## 2018-10-23 PROCEDURE — 99214 OFFICE O/P EST MOD 30 MIN: CPT | Performed by: NURSE PRACTITIONER

## 2018-10-23 PROCEDURE — G8484 FLU IMMUNIZE NO ADMIN: HCPCS | Performed by: NURSE PRACTITIONER

## 2018-10-23 PROCEDURE — 1036F TOBACCO NON-USER: CPT | Performed by: NURSE PRACTITIONER

## 2018-10-23 PROCEDURE — G8427 DOCREV CUR MEDS BY ELIG CLIN: HCPCS | Performed by: NURSE PRACTITIONER

## 2018-10-23 PROCEDURE — G8417 CALC BMI ABV UP PARAM F/U: HCPCS | Performed by: NURSE PRACTITIONER

## 2018-10-23 RX ORDER — ZOLMITRIPTAN 2.5 MG/1
SPRAY, METERED NASAL
Qty: 3 EACH | Refills: 2 | Status: SHIPPED | OUTPATIENT
Start: 2018-10-23 | End: 2018-11-02 | Stop reason: CLARIF

## 2018-10-23 RX ORDER — CYCLOBENZAPRINE HCL 5 MG
5 TABLET ORAL 2 TIMES DAILY PRN
COMMUNITY
Start: 2018-01-17 | End: 2019-03-22 | Stop reason: ALTCHOICE

## 2018-10-23 ASSESSMENT — PATIENT HEALTH QUESTIONNAIRE - PHQ9
SUM OF ALL RESPONSES TO PHQ QUESTIONS 1-9: 2
SUM OF ALL RESPONSES TO PHQ9 QUESTIONS 1 & 2: 2
SUM OF ALL RESPONSES TO PHQ QUESTIONS 1-9: 2
2. FEELING DOWN, DEPRESSED OR HOPELESS: 1
1. LITTLE INTEREST OR PLEASURE IN DOING THINGS: 1

## 2018-10-23 NOTE — PATIENT INSTRUCTIONS
Patient Education        Carpal Tunnel Syndrome: Care Instructions  Your Care Instructions    Carpal tunnel syndrome is a nerve problem. It can cause tingling, numbness, weakness, or pain in the fingers, thumb, and hand. The median nerve and several tough tissues called tendons run through a space in the wrist called the carpal tunnel. The repeated hand motions used in work and some hobbies and sports can put pressure on the nerve. Pregnancy and several conditions, including diabetes, arthritis, and an underactive thyroid, also can cause carpal tunnel syndrome. You may be able to limit an activity or do it differently to reduce your symptoms. You also can take other steps to feel better. If your symptoms are mild, 1 to 2 weeks of home treatment are likely to ease your pain. Surgery is needed only if other treatments do not work. Follow-up care is a key part of your treatment and safety. Be sure to make and go to all appointments, and call your doctor if you are having problems. It's also a good idea to know your test results and keep a list of the medicines you take. How can you care for yourself at home? · If possible, stop or reduce the activity that causes your symptoms. If you cannot stop the activity, take frequent breaks to rest and stretch or change hand positions to do a task. Try switching hands, such as when using a computer mouse. · Try to avoid bending or twisting your wrists. · Ask your doctor if you can take an over-the-counter pain medicine, such as acetaminophen (Tylenol), ibuprofen (Advil, Motrin), or naproxen (Aleve). Be safe with medicines. Read and follow all instructions on the label. · If your doctor prescribes corticosteroid medicine to help reduce pain and swelling, take it exactly as prescribed. Call your doctor if you think you are having a problem with your medicine. · Put ice or a cold pack on your wrist for 10 to 20 minutes at a time to ease pain.  Put a thin cloth between the ice and your skin. · If your doctor or your physical or occupational therapist tells you to wear a wrist splint, wear it as directed to keep your wrist in a neutral position. This also eases pressure on your median nerve. · Ask your doctor whether you should have physical or occupational therapy to learn how to do tasks differently. · Try a yoga class to stretch your muscles and build strength in your hands and wrists. Yoga has been shown to ease carpal tunnel symptoms. To prevent carpal tunnel  · When working at a computer, keep your hands and wrists in line with your forearms. Hold your elbows close to your sides. Take a break every 10 to 15 minutes. · Try these exercises:  ¨ Warm up: Rotate your wrist up, down, and from side to side. Repeat this 4 times. Stretch your fingers far apart, relax them, then stretch them again. Repeat 4 times. Stretch your thumb by pulling it back gently, holding it, and then releasing it. Repeat 4 times. ¨ Prayer stretch: Start with your palms together in front of your chest just below your chin. Slowly lower your hands toward your waistline while keeping your hands close to your stomach and your palms together until you feel a mild to moderate stretch under your forearms. Hold for 10 to 20 seconds. Repeat 4 times. ¨ Wrist flexor stretch: Hold your arm in front of you with your palm up. Bend your wrist, pointing your hand toward the floor. With your other hand, gently bend your wrist further until you feel a mild to moderate stretch in your forearm. Hold for 10 to 20 seconds. Repeat 4 times. ¨ Wrist extensor stretch: Repeat the steps for the wrist flexor stretch, but begin with your extended hand palm down. · Squeeze a rubber exercise ball several times a day to keep your hands and fingers strong. · Avoid holding objects (such as a book) in one position for a long time. When possible, use your whole hand to grasp an object.  Using just the thumb and index finger can put sudden, severe headache that is different from past headaches.    Call your doctor now or seek immediate medical care if:    · You have new or worse nausea and vomiting.     · You have a new or higher fever.     · Your headache gets much worse.    Watch closely for changes in your health, and be sure to contact your doctor if:    · You are not getting better after 2 days (48 hours). Where can you learn more? Go to https://GNS3 Technologies Inc.peJ. Craig Venter Instituteeb.EchoFirst. org and sign in to your Yella Rewards account. Enter B828 in the Convergent.io Technologies box to learn more about \"Migraine Headache: Care Instructions. \"     If you do not have an account, please click on the \"Sign Up Now\" link. Current as of: October 9, 2017  Content Version: 11.7  © 5216-7417 Hithru, Incorporated. Care instructions adapted under license by [facility] ([facility]). If you have questions about a medical condition or this instruction, always ask your healthcare professional. [place] [address] any warranty or liability for your use of this information.

## 2018-10-26 ENCOUNTER — TELEPHONE (OUTPATIENT)
Dept: FAMILY MEDICINE CLINIC | Age: 38
End: 2018-10-26

## 2018-10-31 ENCOUNTER — TELEPHONE (OUTPATIENT)
Dept: FAMILY MEDICINE CLINIC | Age: 38
End: 2018-10-31

## 2018-11-02 ENCOUNTER — TELEPHONE (OUTPATIENT)
Dept: FAMILY MEDICINE CLINIC | Age: 38
End: 2018-11-02

## 2018-11-02 ENCOUNTER — OFFICE VISIT (OUTPATIENT)
Dept: FAMILY MEDICINE CLINIC | Age: 38
End: 2018-11-02
Payer: MEDICARE

## 2018-11-02 VITALS
HEIGHT: 60 IN | HEART RATE: 86 BPM | WEIGHT: 239 LBS | RESPIRATION RATE: 18 BRPM | OXYGEN SATURATION: 96 % | SYSTOLIC BLOOD PRESSURE: 136 MMHG | DIASTOLIC BLOOD PRESSURE: 86 MMHG | BODY MASS INDEX: 46.92 KG/M2 | TEMPERATURE: 97.8 F

## 2018-11-02 DIAGNOSIS — K59.00 CONSTIPATION, UNSPECIFIED CONSTIPATION TYPE: ICD-10-CM

## 2018-11-02 DIAGNOSIS — R06.2 WHEEZING: ICD-10-CM

## 2018-11-02 DIAGNOSIS — J40 BRONCHITIS: Primary | ICD-10-CM

## 2018-11-02 PROCEDURE — 1036F TOBACCO NON-USER: CPT | Performed by: NURSE PRACTITIONER

## 2018-11-02 PROCEDURE — G8484 FLU IMMUNIZE NO ADMIN: HCPCS | Performed by: NURSE PRACTITIONER

## 2018-11-02 PROCEDURE — G8427 DOCREV CUR MEDS BY ELIG CLIN: HCPCS | Performed by: NURSE PRACTITIONER

## 2018-11-02 PROCEDURE — 96372 THER/PROPH/DIAG INJ SC/IM: CPT | Performed by: NURSE PRACTITIONER

## 2018-11-02 PROCEDURE — 94640 AIRWAY INHALATION TREATMENT: CPT | Performed by: NURSE PRACTITIONER

## 2018-11-02 PROCEDURE — G8417 CALC BMI ABV UP PARAM F/U: HCPCS | Performed by: NURSE PRACTITIONER

## 2018-11-02 PROCEDURE — 99214 OFFICE O/P EST MOD 30 MIN: CPT | Performed by: NURSE PRACTITIONER

## 2018-11-02 RX ORDER — PREDNISONE 10 MG/1
TABLET ORAL
Qty: 12 TABLET | Refills: 0 | Status: SHIPPED | OUTPATIENT
Start: 2018-11-02 | End: 2018-11-13 | Stop reason: ALTCHOICE

## 2018-11-02 RX ORDER — METHYLPREDNISOLONE ACETATE 80 MG/ML
8 INJECTION, SUSPENSION INTRA-ARTICULAR; INTRALESIONAL; INTRAMUSCULAR; SOFT TISSUE ONCE
Status: COMPLETED | OUTPATIENT
Start: 2018-11-02 | End: 2018-11-02

## 2018-11-02 RX ORDER — ALBUTEROL SULFATE 2.5 MG/3ML
2.5 SOLUTION RESPIRATORY (INHALATION) ONCE
Status: COMPLETED | OUTPATIENT
Start: 2018-11-02 | End: 2018-11-02

## 2018-11-02 RX ORDER — POLYETHYLENE GLYCOL 3350 17 G/17G
17 POWDER, FOR SOLUTION ORAL DAILY
Qty: 510 G | Refills: 0 | Status: SHIPPED | OUTPATIENT
Start: 2018-11-02 | End: 2018-12-02

## 2018-11-02 RX ORDER — AZITHROMYCIN 250 MG/1
TABLET, FILM COATED ORAL
Qty: 6 TABLET | Refills: 0 | Status: SHIPPED | OUTPATIENT
Start: 2018-11-02 | End: 2018-11-13 | Stop reason: ALTCHOICE

## 2018-11-02 RX ADMIN — METHYLPREDNISOLONE ACETATE 8 MG: 80 INJECTION, SUSPENSION INTRA-ARTICULAR; INTRALESIONAL; INTRAMUSCULAR; SOFT TISSUE at 09:49

## 2018-11-02 RX ADMIN — ALBUTEROL SULFATE 2.5 MG: 2.5 SOLUTION RESPIRATORY (INHALATION) at 09:48

## 2018-11-02 ASSESSMENT — ENCOUNTER SYMPTOMS
CONSTIPATION: 1
COUGH: 1
DIARRHEA: 1
WHEEZING: 1

## 2018-11-02 ASSESSMENT — PATIENT HEALTH QUESTIONNAIRE - PHQ9
2. FEELING DOWN, DEPRESSED OR HOPELESS: 0
SUM OF ALL RESPONSES TO PHQ9 QUESTIONS 1 & 2: 0
SUM OF ALL RESPONSES TO PHQ QUESTIONS 1-9: 0
SUM OF ALL RESPONSES TO PHQ QUESTIONS 1-9: 0
1. LITTLE INTEREST OR PLEASURE IN DOING THINGS: 0

## 2018-11-02 NOTE — TELEPHONE ENCOUNTER
THE PA DID STATE SHE HAD CHRONIC CLUSTER HEADACHES  ALSO THAT SHE HAS TRIED AND FAILED [address] [place] [address].   THE PA WAS DONE IN [facility]

## 2018-11-02 NOTE — PROGRESS NOTES
Administrations This Visit     albuterol (PROVENTIL) nebulizer solution 2.5 mg     Admin Date  11/02/2018  09:48 Action  Given Dose  2.5 mg Route  Nebulization Site  Other Administered By  Sherry Kim CMA    Ordering Provider:  TIARA Perez CNP    NDC:  2396-4690-29    Lot#:  [id number]    :  [address] [facility].     Patient Supplied?:  No    Comments:  SITE: NEBULIZERND# [id number] DATE: 06/2019VERIFIED BY: ML          methylPREDNISolone acetate (DEPO-MEDROL) injection 8 mg     Admin Date  11/02/2018  09:49 Action  Given Dose  8 mg Route  Nebulization Site  Other Administered By  Sherry Kim CMA    Ordering Provider:  TIARA Perez CNP    NDC:  1598-1096-46    Lot#:  [id number]    :  [employer]    Patient Supplied?:  No    Comments:  SITE: NEBULIZERNDC# [id number]# [id number] DATE: 08/2019VERIFIED BY: ML

## 2018-11-02 NOTE — TELEPHONE ENCOUNTER
PA HAS BEEN RE-SUBMITTED VIA [facility] FOR Zomig 2.5MG nasal spray  Key: [id number] - PA Case ID: [id number]   STATUS: PENDING

## 2018-11-07 DIAGNOSIS — G43.009 MIGRAINE WITHOUT AURA AND WITHOUT STATUS MIGRAINOSUS, NOT INTRACTABLE: Primary | ICD-10-CM

## 2018-11-07 RX ORDER — ZOLMITRIPTAN 2.5 MG/1
SPRAY, METERED NASAL
Qty: 3 EACH | Refills: 2 | Status: SHIPPED | OUTPATIENT
Start: 2018-11-07 | End: 2019-03-07 | Stop reason: SDUPTHER

## 2018-11-13 ENCOUNTER — OFFICE VISIT (OUTPATIENT)
Dept: FAMILY MEDICINE CLINIC | Age: 38
End: 2018-11-13
Payer: MEDICARE

## 2018-11-13 VITALS
BODY MASS INDEX: 47.39 KG/M2 | DIASTOLIC BLOOD PRESSURE: 72 MMHG | HEART RATE: 81 BPM | HEIGHT: 60 IN | SYSTOLIC BLOOD PRESSURE: 114 MMHG | OXYGEN SATURATION: 98 % | RESPIRATION RATE: 18 BRPM | WEIGHT: 241.4 LBS

## 2018-11-13 DIAGNOSIS — F41.9 ANXIETY AND DEPRESSION: ICD-10-CM

## 2018-11-13 DIAGNOSIS — E66.01 CLASS 3 SEVERE OBESITY WITHOUT SERIOUS COMORBIDITY WITH BODY MASS INDEX (BMI) OF 45.0 TO 49.9 IN ADULT, UNSPECIFIED OBESITY TYPE (HCC): ICD-10-CM

## 2018-11-13 DIAGNOSIS — R20.2 PARESTHESIA OF LEFT ARM: Primary | ICD-10-CM

## 2018-11-13 DIAGNOSIS — Z11.4 ENCOUNTER FOR SCREENING FOR HIV: ICD-10-CM

## 2018-11-13 DIAGNOSIS — Z13.1 DIABETES MELLITUS SCREENING: ICD-10-CM

## 2018-11-13 DIAGNOSIS — Z13.220 LIPID SCREENING: ICD-10-CM

## 2018-11-13 DIAGNOSIS — M54.5 ACUTE LOW BACK PAIN, UNSPECIFIED BACK PAIN LATERALITY, WITH SCIATICA PRESENCE UNSPECIFIED: Primary | ICD-10-CM

## 2018-11-13 DIAGNOSIS — F32.A ANXIETY AND DEPRESSION: ICD-10-CM

## 2018-11-13 LAB
A/G RATIO: 1.3 (ref 1.1–2.2)
ALBUMIN SERPL-MCNC: 4.5 G/DL (ref 3.4–5)
ALP BLD-CCNC: 97 U/L (ref 40–129)
ALT SERPL-CCNC: 22 U/L (ref 10–40)
ANION GAP SERPL CALCULATED.3IONS-SCNC: 16 MMOL/L (ref 3–16)
AST SERPL-CCNC: 16 U/L (ref 15–37)
BILIRUB SERPL-MCNC: 0.5 MG/DL (ref 0–1)
BUN BLDV-MCNC: 18 MG/DL (ref 7–20)
CALCIUM SERPL-MCNC: 10.2 MG/DL (ref 8.3–10.6)
CHLORIDE BLD-SCNC: 96 MMOL/L (ref 99–110)
CHOLESTEROL, TOTAL: 206 MG/DL (ref 0–199)
CO2: 28 MMOL/L (ref 21–32)
CREAT SERPL-MCNC: 0.8 MG/DL (ref 0.6–1.1)
GFR AFRICAN AMERICAN: >60
GFR NON-AFRICAN AMERICAN: >60
GLOBULIN: 3.5 G/DL
GLUCOSE BLD-MCNC: 99 MG/DL (ref 70–99)
HDLC SERPL-MCNC: 43 MG/DL (ref 40–60)
LDL CHOLESTEROL CALCULATED: 112 MG/DL
POTASSIUM SERPL-SCNC: 4.9 MMOL/L (ref 3.5–5.1)
SODIUM BLD-SCNC: 140 MMOL/L (ref 136–145)
TOTAL PROTEIN: 8 G/DL (ref 6.4–8.2)
TRIGL SERPL-MCNC: 254 MG/DL (ref 0–150)
VLDLC SERPL CALC-MCNC: 51 MG/DL

## 2018-11-13 PROCEDURE — 99214 OFFICE O/P EST MOD 30 MIN: CPT | Performed by: NURSE PRACTITIONER

## 2018-11-13 PROCEDURE — G8417 CALC BMI ABV UP PARAM F/U: HCPCS | Performed by: NURSE PRACTITIONER

## 2018-11-13 PROCEDURE — G8427 DOCREV CUR MEDS BY ELIG CLIN: HCPCS | Performed by: NURSE PRACTITIONER

## 2018-11-13 PROCEDURE — 36415 COLL VENOUS BLD VENIPUNCTURE: CPT | Performed by: NURSE PRACTITIONER

## 2018-11-13 PROCEDURE — 1036F TOBACCO NON-USER: CPT | Performed by: NURSE PRACTITIONER

## 2018-11-13 PROCEDURE — G8484 FLU IMMUNIZE NO ADMIN: HCPCS | Performed by: NURSE PRACTITIONER

## 2018-11-13 ASSESSMENT — ANXIETY QUESTIONNAIRES: GAD7 TOTAL SCORE: 4

## 2018-11-13 ASSESSMENT — PATIENT HEALTH QUESTIONNAIRE - PHQ9
SUM OF ALL RESPONSES TO PHQ QUESTIONS 1-9: 2
SUM OF ALL RESPONSES TO PHQ QUESTIONS 1-9: 2

## 2018-11-13 ASSESSMENT — LIFESTYLE VARIABLES: HOW OFTEN DO YOU HAVE A DRINK CONTAINING ALCOHOL: 0

## 2018-11-13 NOTE — PROGRESS NOTES
History and Physical      Brad Valenzuela  YOB: 1980    Date of Service:  11/13/2018    Chief Complaint:   Brad Valenzuela is a 45 y.o. female who presents for complete physical examination.     HPI: FASTING TODAY- HAS AN APPT WITH PRISCILLA IN 12/18  LAST PAP 11/2016 SHE IS NOT SEXUALLY ACTIVE    Wt Readings from Last 3 Encounters:   11/13/18 241 lb 6.4 oz (109.5 kg)   11/02/18 239 lb (108.4 kg)   10/23/18 241 lb 6.4 oz (109.5 kg)     BP Readings from Last 3 Encounters:   11/13/18 114/72   11/02/18 136/86   10/23/18 124/80       Patient Active Problem List   Diagnosis    Migraines, neuralgic    Bipolar disorder (HCC)    Anxiety    Elevated blood pressure reading    Obesity    Foot pain    HTN (hypertension), benign    Insomnia    Mild intellectual disability    Migraine without aura and without status migrainosus, not intractable    PRISCILLA (obstructive sleep apnea)    Chronic eczematous otitis externa of both ears    Disorder of both eustachian tubes    Laryngopharyngeal reflux disease       Allergies   Allergen Reactions    Sertraline Other (See Comments)     UNABLE TO BREATH     Outpatient Prescriptions Marked as Taking for the 11/13/18 encounter (Office Visit) with TIARA Maynard CNP   Medication Sig Dispense Refill    ZOLMitriptan (ZOMIG) 2.5 MG SOLN 1 ACTUATION IN ONE NOSTRIL X 1 NO MORE THAN 2 ACTUATIONS  DAILY 3 each 2    polyethylene glycol (MIRALAX) powder Take 17 g by mouth daily 510 g 0    cyclobenzaprine (FLEXERIL) 5 MG tablet Take 5 mg by mouth 2 times daily as needed      metoprolol tartrate (LOPRESSOR) 25 MG tablet Take 75 mg by mouth 2 times daily      naproxen (NAPROSYN) 500 MG tablet Take 1 tablet by mouth 2 times daily (with meals) 60 tablet 0    albuterol sulfate HFA (PROVENTIL HFA) 108 (90 BASE) MCG/ACT inhaler Inhale 2 puffs into the lungs every 4 hours as needed for Wheezing 1 Inhaler 5       Past Medical History:   Diagnosis Date    Anxiety     Bipolar

## 2018-11-13 NOTE — PROGRESS NOTES
SUBJECTIVE:    Patient ID: Heber Gan is a 45 y.o. y.o. female. HPI      Review of Systems     OBJECTIVE:    Office Visit on 02/17/2017   Component Date Value Ref Range Status    Color, UA 02/17/2017 YELLOW  Straw/Yellow Final    Clarity, UA 02/17/2017 Clear  Clear Final    Glucose, Ur 02/17/2017 Negative  Negative mg/dL Final    Bilirubin Urine 02/17/2017 Negative  Negative Final    Ketones, Urine 02/17/2017 Negative  Negative mg/dL Final    Specific Gravity, UA 02/17/2017 1.029  1.005 - 1.030 Final    Blood, Urine 02/17/2017 Negative  Negative Final    pH, UA 02/17/2017 5.0  5.0 - 8.0 Final    Protein, UA 02/17/2017 Negative  Negative mg/dL Final    Urobilinogen, Urine 02/17/2017 0.2  <2.0 E.U./dL Final    Nitrite, Urine 02/17/2017 Negative  Negative Final    Leukocyte Esterase, Urine 02/17/2017 Negative  Negative Final    Microscopic Examination 02/17/2017 Not Indicated   Final    Urine Reflex to Culture 02/17/2017 Not Indicated   Final    Urine Type 02/17/2017 Voided   Final       Physical Exam    ASSESSMENT:  {No diagnosis found. (Refresh or delete this SmartLink)}      PLAN:  There are no diagnoses linked to this encounter.

## 2018-11-13 NOTE — PATIENT INSTRUCTIONS
Ask your doctor whether you should have tests for diabetes. Colon cancer. Have a test for colon cancer at age 48. You may have one of several tests. If you are younger than 48, you may need a test earlier if you have any risk factors. Risk factors include whether you already had a precancerous polyp removed from your colon or whether your parent, brother, sister, or child has had colon cancer. For women  Breast exam and mammogram. Talk to your doctor about when you should have a clinical breast exam and a mammogram. Medical experts differ on whether and how often women under 50 should have these tests. Your doctor can help you decide what is right for you. Pap test and pelvic exam. Begin Pap tests at age 24. A Pap test is the best way to find cervical cancer. The test often is part of a pelvic exam. Ask how often to have this test.  Tests for sexually transmitted infections (STIs). Ask whether you should have tests for STIs. You may be at risk if you have sex with more than one person, especially if your partners do not wear condoms. For men  Tests for sexually transmitted infections (STIs). Ask whether you should have tests for STIs. You may be at risk if you have sex with more than one person, especially if you do not wear a condom. Testicular cancer exam. Ask your doctor whether you should check your testicles regularly. Prostate exam. Talk to your doctor about whether you should have a blood test (called a PSA test) for prostate cancer. Experts differ on whether and when men should have this test. Some experts suggest it if you are older than 39 and are -American or have a father or brother who got prostate cancer when he was younger than 72. When should you call for help? Watch closely for changes in your health, and be sure to contact your doctor if you have any problems or symptoms that concern you. Where can you learn more? Go to https://chtoyaeb.health-partners. org and sign in to your

## 2018-11-14 LAB
HIV AG/AB: NORMAL
HIV ANTIGEN: NORMAL
HIV-1 ANTIBODY: NORMAL
HIV-2 AB: NORMAL

## 2018-12-04 ENCOUNTER — OFFICE VISIT (OUTPATIENT)
Dept: PSYCHIATRY | Age: 38
End: 2018-12-04
Payer: MEDICARE

## 2018-12-04 VITALS
HEART RATE: 76 BPM | BODY MASS INDEX: 46.44 KG/M2 | DIASTOLIC BLOOD PRESSURE: 84 MMHG | WEIGHT: 246 LBS | HEIGHT: 61 IN | SYSTOLIC BLOOD PRESSURE: 122 MMHG

## 2018-12-04 DIAGNOSIS — F33.1 MODERATE EPISODE OF RECURRENT MAJOR DEPRESSIVE DISORDER (HCC): ICD-10-CM

## 2018-12-04 DIAGNOSIS — Z79.899 OTHER LONG TERM (CURRENT) DRUG THERAPY: ICD-10-CM

## 2018-12-04 DIAGNOSIS — F70 MILD INTELLECTUAL DISABILITY: ICD-10-CM

## 2018-12-04 DIAGNOSIS — E55.9 VITAMIN D DEFICIENCY: ICD-10-CM

## 2018-12-04 DIAGNOSIS — F33.1 MODERATE EPISODE OF RECURRENT MAJOR DEPRESSIVE DISORDER (HCC): Primary | ICD-10-CM

## 2018-12-04 DIAGNOSIS — F42.2 MIXED OBSESSIONAL THOUGHTS AND ACTS: ICD-10-CM

## 2018-12-04 LAB
TSH REFLEX FT4: 1.83 UIU/ML (ref 0.27–4.2)
VITAMIN D 25-HYDROXY: 29 NG/ML

## 2018-12-04 PROCEDURE — G8427 DOCREV CUR MEDS BY ELIG CLIN: HCPCS | Performed by: NURSE PRACTITIONER

## 2018-12-04 PROCEDURE — G8417 CALC BMI ABV UP PARAM F/U: HCPCS | Performed by: NURSE PRACTITIONER

## 2018-12-04 PROCEDURE — 1036F TOBACCO NON-USER: CPT | Performed by: NURSE PRACTITIONER

## 2018-12-04 PROCEDURE — G8484 FLU IMMUNIZE NO ADMIN: HCPCS | Performed by: NURSE PRACTITIONER

## 2018-12-04 PROCEDURE — 99204 OFFICE O/P NEW MOD 45 MIN: CPT | Performed by: NURSE PRACTITIONER

## 2018-12-04 RX ORDER — ESCITALOPRAM OXALATE 5 MG/1
5 TABLET ORAL DAILY
Qty: 30 TABLET | Refills: 3 | Status: SHIPPED | OUTPATIENT
Start: 2018-12-04 | End: 2019-01-17 | Stop reason: SDUPTHER

## 2018-12-04 RX ORDER — HYDROXYZINE HYDROCHLORIDE 25 MG/1
25 TABLET, FILM COATED ORAL 3 TIMES DAILY PRN
Qty: 60 TABLET | Refills: 3 | Status: SHIPPED | OUTPATIENT
Start: 2018-12-04 | End: 2019-01-03

## 2018-12-04 ASSESSMENT — ANXIETY QUESTIONNAIRES
3. WORRYING TOO MUCH ABOUT DIFFERENT THINGS: 2-OVER HALF THE DAYS
6. BECOMING EASILY ANNOYED OR IRRITABLE: 3-NEARLY EVERY DAY
GAD7 TOTAL SCORE: 14
1. FEELING NERVOUS, ANXIOUS, OR ON EDGE: 2-OVER HALF THE DAYS
7. FEELING AFRAID AS IF SOMETHING AWFUL MIGHT HAPPEN: 0-NOT AT ALL SURE
2. NOT BEING ABLE TO STOP OR CONTROL WORRYING: 2-OVER HALF THE DAYS
5. BEING SO RESTLESS THAT IT IS HARD TO SIT STILL: 2-OVER HALF THE DAYS
4. TROUBLE RELAXING: 3-NEARLY EVERY DAY

## 2018-12-04 ASSESSMENT — PATIENT HEALTH QUESTIONNAIRE - PHQ9
5. POOR APPETITE OR OVEREATING: 2
6. FEELING BAD ABOUT YOURSELF - OR THAT YOU ARE A FAILURE OR HAVE LET YOURSELF OR YOUR FAMILY DOWN: 0
SUM OF ALL RESPONSES TO PHQ9 QUESTIONS 1 & 2: 3
4. FEELING TIRED OR HAVING LITTLE ENERGY: 2
8. MOVING OR SPEAKING SO SLOWLY THAT OTHER PEOPLE COULD HAVE NOTICED. OR THE OPPOSITE, BEING SO FIGETY OR RESTLESS THAT YOU HAVE BEEN MOVING AROUND A LOT MORE THAN USUAL: 0
SUM OF ALL RESPONSES TO PHQ QUESTIONS 1-9: 13
2. FEELING DOWN, DEPRESSED OR HOPELESS: 1
3. TROUBLE FALLING OR STAYING ASLEEP: 3
1. LITTLE INTEREST OR PLEASURE IN DOING THINGS: 2
7. TROUBLE CONCENTRATING ON THINGS, SUCH AS READING THE NEWSPAPER OR WATCHING TELEVISION: 3
SUM OF ALL RESPONSES TO PHQ QUESTIONS 1-9: 13
9. THOUGHTS THAT YOU WOULD BE BETTER OFF DEAD, OR OF HURTING YOURSELF: 0
10. IF YOU CHECKED OFF ANY PROBLEMS, HOW DIFFICULT HAVE THESE PROBLEMS MADE IT FOR YOU TO DO YOUR WORK, TAKE CARE OF THINGS AT HOME, OR GET ALONG WITH OTHER PEOPLE: 0
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
## 2018-12-05 ENCOUNTER — OFFICE VISIT (OUTPATIENT)
Dept: FAMILY MEDICINE CLINIC | Age: 38
End: 2018-12-05
Payer: MEDICARE

## 2018-12-05 ENCOUNTER — TELEPHONE (OUTPATIENT)
Dept: FAMILY MEDICINE CLINIC | Age: 38
End: 2018-12-05

## 2018-12-05 ENCOUNTER — HOSPITAL ENCOUNTER (OUTPATIENT)
Dept: GENERAL RADIOLOGY | Age: 38
Discharge: HOME | End: 2018-12-05
Payer: MEDICARE

## 2018-12-05 ENCOUNTER — HOSPITAL ENCOUNTER (OUTPATIENT)
Age: 38
Discharge: HOME | End: 2018-12-05
Payer: MEDICARE

## 2018-12-05 VITALS
DIASTOLIC BLOOD PRESSURE: 78 MMHG | BODY MASS INDEX: 45.84 KG/M2 | HEART RATE: 91 BPM | WEIGHT: 242.8 LBS | HEIGHT: 61 IN | RESPIRATION RATE: 18 BRPM | OXYGEN SATURATION: 97 % | SYSTOLIC BLOOD PRESSURE: 128 MMHG

## 2018-12-05 DIAGNOSIS — M54.5 ACUTE LOW BACK PAIN, UNSPECIFIED BACK PAIN LATERALITY, WITH SCIATICA PRESENCE UNSPECIFIED: ICD-10-CM

## 2018-12-05 DIAGNOSIS — Z13.31 POSITIVE DEPRESSION SCREENING: ICD-10-CM

## 2018-12-05 LAB
ESTIMATED AVERAGE GLUCOSE: 125.5 MG/DL
HBA1C MFR BLD: 6 %

## 2018-12-05 PROCEDURE — 72100 X-RAY EXAM L-S SPINE 2/3 VWS: CPT

## 2018-12-05 PROCEDURE — 99213 OFFICE O/P EST LOW 20 MIN: CPT | Performed by: NURSE PRACTITIONER

## 2018-12-05 PROCEDURE — G8484 FLU IMMUNIZE NO ADMIN: HCPCS | Performed by: NURSE PRACTITIONER

## 2018-12-05 PROCEDURE — G8417 CALC BMI ABV UP PARAM F/U: HCPCS | Performed by: NURSE PRACTITIONER

## 2018-12-05 PROCEDURE — G8427 DOCREV CUR MEDS BY ELIG CLIN: HCPCS | Performed by: NURSE PRACTITIONER

## 2018-12-05 PROCEDURE — G8431 POS CLIN DEPRES SCRN F/U DOC: HCPCS | Performed by: NURSE PRACTITIONER

## 2018-12-05 PROCEDURE — 1036F TOBACCO NON-USER: CPT | Performed by: NURSE PRACTITIONER

## 2018-12-05 RX ORDER — PREDNISONE 10 MG/1
TABLET ORAL
Qty: 20 TABLET | Refills: 0 | Status: SHIPPED | OUTPATIENT
Start: 2018-12-05 | End: 2018-12-18 | Stop reason: ALTCHOICE

## 2018-12-05 RX ORDER — ERGOCALCIFEROL (VITAMIN D2) 50 MCG
2000 CAPSULE ORAL DAILY
Qty: 30 CAPSULE | Refills: 3 | Status: SHIPPED | OUTPATIENT
Start: 2018-12-05 | End: 2019-03-21

## 2018-12-05 NOTE — TELEPHONE ENCOUNTER
I gave pt the results of her xray. Does she need to do therapy? Does she need to follow up with you?     Please call pt

## 2018-12-05 NOTE — TELEPHONE ENCOUNTER
CALLED AND MAN VOICE ANSWERED THE PHONE. HE WILL HAVE PATIENT CALL THE OFFICE WHEN SHE GETS HOME.  SC

## 2018-12-10 ENCOUNTER — PROCEDURE VISIT (OUTPATIENT)
Dept: NEUROLOGY | Age: 38
End: 2018-12-10
Payer: MEDICARE

## 2018-12-10 DIAGNOSIS — G43.909 MIGRAINE WITHOUT STATUS MIGRAINOSUS, NOT INTRACTABLE, UNSPECIFIED MIGRAINE TYPE: Primary | ICD-10-CM

## 2018-12-10 DIAGNOSIS — R20.0 LEFT ARM NUMBNESS: Primary | ICD-10-CM

## 2018-12-10 PROCEDURE — 95886 MUSC TEST DONE W/N TEST COMP: CPT | Performed by: PSYCHIATRY & NEUROLOGY

## 2018-12-10 PROCEDURE — 95909 NRV CNDJ TST 5-6 STUDIES: CPT | Performed by: PSYCHIATRY & NEUROLOGY

## 2018-12-12 ENCOUNTER — PATIENT MESSAGE (OUTPATIENT)
Dept: FAMILY MEDICINE CLINIC | Age: 38
End: 2018-12-12

## 2018-12-12 ENCOUNTER — OFFICE VISIT (OUTPATIENT)
Dept: ORTHOPEDIC SURGERY | Age: 38
End: 2018-12-12
Payer: MEDICARE

## 2018-12-12 ENCOUNTER — TELEPHONE (OUTPATIENT)
Dept: PULMONOLOGY | Age: 38
End: 2018-12-12

## 2018-12-12 VITALS
SYSTOLIC BLOOD PRESSURE: 152 MMHG | HEART RATE: 62 BPM | HEIGHT: 61 IN | BODY MASS INDEX: 45.69 KG/M2 | DIASTOLIC BLOOD PRESSURE: 92 MMHG | WEIGHT: 242 LBS

## 2018-12-12 DIAGNOSIS — G56.01 CARPAL TUNNEL SYNDROME OF RIGHT WRIST: Primary | ICD-10-CM

## 2018-12-12 DIAGNOSIS — R06.02 SOB (SHORTNESS OF BREATH): Primary | ICD-10-CM

## 2018-12-12 PROCEDURE — 99213 OFFICE O/P EST LOW 20 MIN: CPT | Performed by: ORTHOPAEDIC SURGERY

## 2018-12-12 PROCEDURE — G8417 CALC BMI ABV UP PARAM F/U: HCPCS | Performed by: ORTHOPAEDIC SURGERY

## 2018-12-12 PROCEDURE — G8427 DOCREV CUR MEDS BY ELIG CLIN: HCPCS | Performed by: ORTHOPAEDIC SURGERY

## 2018-12-12 PROCEDURE — G8484 FLU IMMUNIZE NO ADMIN: HCPCS | Performed by: ORTHOPAEDIC SURGERY

## 2018-12-12 NOTE — LETTER
CONSENT TO OPERATION  AND/OR OTHER PROCEDURE(S)          PATIENT : Kemar Mccoy   YOB: 1980      DATE : 12/12/18          1. I request and consent that Dr. Macario Babin. Viry Kirk,  and/or his associates or assistants perform an operation and/or procedures on the above patient at  [place] [address], to treat the condition(s) which appear indicated by the diagnostic studies already performed. I have been told that in general terms the nature, purpose and reasonable expectations of the operation and/or procedure(s) are:       right Carpal Tunnel Release      2. It has been explained to me by the informing physician that during the course of the operation and/or procedure(s) unforeseen conditions may be revealed that necessitate an extension of the original operation and/or procedure(s) or different operation and/or procedures than those set forth in Paragraph 1. I therefore authorize and request that my physician and/or his associates or assistants perform such operations and/or procedures as are necessary and desirable in the exercise of professional judgment. The authority granted under this Paragraph 2 shall extend to all conditions that require treatment and are known to my physician at the time the operation is commenced. 3. I have been made aware by the informing physician of certain risks and consequences that are associated with the operation and/or procedure(s) described in Paragraph 1, the reasonable alternative methods or treatment, the possible consequences, the possibility that the operation and/or procedure(s) may be unsuccessful and the possibility of complications. I understand the reasonably known risks to be:      ? Bleeding  ? Infection  ? Poor Healing  ? Possible Damage to Nerve, Vessel, Tendon/Muscle or Bone  ? Need for further Treatment/Surgery  ? Stiffness  ? Pain  ? Residual or Recurrent Symptoms  ? Anesthetic and/or Medical Risks  ?

## 2018-12-12 NOTE — LETTER
[facility]  Surgery Scheduling Form:      DEMOGRAPHICS:                                                                                                              .    Patient Name:  Richardson Moseley  Patient :  1980   Patient SS#:  [id number]    Patient Phone:  [phone] (home) [phone] (work)   Patient Address:   [id number] [place] [id number]    PCP:  TIARA Rivas CNP  Insurance:  Payor/Plan Subscr  Sex Relation Sub. Ins. ID Effective Group Num   1. [address] 1980 Female  [id number]                                     [address]   2. [place]ICAID [place] -* MARYBEL LORD E 1980 Female Self [id number] 10/1/18                                    [address]       DIAGNOSIS & PROCEDURE:                                                                                            .  Diagnosis:   right Carpal Tunnel Syndrome (354.0)     G56.01  Operation:  right Carpal Tunnel Release  [CPT: 59982]  Location:  [facility] AT [place]  Surgeon:  Gauri Card    SCHEDULING INFORMATION:                                                                                         .  Surgeon's Scheduling Instruction:  elective    Requested Date:  1-15  OR Time:  1:30 Patient Arrival Time: 12:00    OR Time Required:  15  Minutes  Anesthesia:  MAC/TIVA  Equipment:  None  Mini C-Arm:  No   Standard C-Arm:  No  Status:  outpatient  PAT Required:  Yes  Comments:        ALLERGIES:    SERTRALINE                       Matt Alvarez MD  18     BILLING INFORMATION:                                                                                                    .    Procedure:       CPT Code Modifier

## 2018-12-18 ENCOUNTER — OFFICE VISIT (OUTPATIENT)
Dept: FAMILY MEDICINE CLINIC | Age: 38
End: 2018-12-18
Payer: MEDICARE

## 2018-12-18 ENCOUNTER — TELEPHONE (OUTPATIENT)
Dept: FAMILY MEDICINE CLINIC | Age: 38
End: 2018-12-18

## 2018-12-18 VITALS
OXYGEN SATURATION: 98 % | HEIGHT: 61 IN | DIASTOLIC BLOOD PRESSURE: 72 MMHG | HEART RATE: 73 BPM | RESPIRATION RATE: 16 BRPM | TEMPERATURE: 98 F | SYSTOLIC BLOOD PRESSURE: 116 MMHG | BODY MASS INDEX: 46.26 KG/M2 | WEIGHT: 245 LBS

## 2018-12-18 DIAGNOSIS — Z01.419 WELL WOMAN EXAM WITH ROUTINE GYNECOLOGICAL EXAM: ICD-10-CM

## 2018-12-18 DIAGNOSIS — G56.01 CARPAL TUNNEL SYNDROME OF RIGHT WRIST: Primary | ICD-10-CM

## 2018-12-18 DIAGNOSIS — Z01.818 PREOP EXAMINATION: ICD-10-CM

## 2018-12-18 DIAGNOSIS — L71.9 ROSACEA: ICD-10-CM

## 2018-12-18 PROCEDURE — G8427 DOCREV CUR MEDS BY ELIG CLIN: HCPCS | Performed by: NURSE PRACTITIONER

## 2018-12-18 PROCEDURE — G8484 FLU IMMUNIZE NO ADMIN: HCPCS | Performed by: NURSE PRACTITIONER

## 2018-12-18 PROCEDURE — G8417 CALC BMI ABV UP PARAM F/U: HCPCS | Performed by: NURSE PRACTITIONER

## 2018-12-18 PROCEDURE — 99212 OFFICE O/P EST SF 10 MIN: CPT | Performed by: NURSE PRACTITIONER

## 2018-12-18 RX ORDER — METRONIDAZOLE 7.5 MG/G
GEL TOPICAL
Qty: 45 G | Refills: 3 | Status: SHIPPED | OUTPATIENT
Start: 2018-12-18 | End: 2019-03-22 | Stop reason: ALTCHOICE

## 2018-12-18 NOTE — PROGRESS NOTES
Assessment:       45 y.o. patient with planned surgery as above. Known risk factors for perioperative complications: None  Current medications which may produce withdrawal symptoms if withheld perioperatively: none      Plan:     1. Preoperative workup as follows: none  2. Change in medication regimen before surgery: Discontinue NSAIDs (NAPROSYN) 7 days before surgery  3. Prophylaxis for cardiac events with perioperative beta-blockers: Not indicated  ACC/AHA indications for pre-operative beta-blocker use:    · Vascular surgery with history of postitive stress test  · Intermediate or high risk surgery with history of CAD   · Intermediate or high risk surgery with multiple clinical predictors of CAD- 2 of the following: history of compensated or prior heart failure, history of cerebrovascular disease, DM, or renal insufficiency    Routine administration of higher-dose, long-acting metoprolol in beta-blocker-naïve patients on the day of surgery, and in the absence of dose titration is associated with an overall increase in mortality. Beta-blockers should be started days to weeks prior to surgery and titrated to pulse < 70.  4. Deep vein thrombosis prophylaxis: regimen to be chosen by surgical team  5.  No contraindications to planned surgery  PAPERWORK FAXED TO [address] [facility]     OBJECTIVE:        Physical Exam    Kim Moreno was seen today for pre-op exam.    Diagnoses and all orders for this visit:    Carpal tunnel syndrome of right wrist    Preop examination

## 2018-12-18 NOTE — TELEPHONE ENCOUNTER
CALLED AND SPOKE TO PATIENT TO LET HER KNOW WE NEVER RECEIVED PAPERWORK. PATIENT STATES SHE WILL CALL THEM AND HAVE THEM SEND THE PAPERS OVER. FAX NUMBER GIVEN TO PATIENT.  SC

## 2018-12-19 ENCOUNTER — HOSPITAL ENCOUNTER (OUTPATIENT)
Dept: PULMONOLOGY | Age: 38
Discharge: HOME | End: 2018-12-19
Payer: MEDICARE

## 2018-12-19 DIAGNOSIS — R06.02 SOB (SHORTNESS OF BREATH): ICD-10-CM

## 2018-12-20 ENCOUNTER — OFFICE VISIT (OUTPATIENT)
Dept: PULMONOLOGY | Age: 38
End: 2018-12-20
Payer: MEDICARE

## 2018-12-20 VITALS
DIASTOLIC BLOOD PRESSURE: 80 MMHG | TEMPERATURE: 98.1 F | BODY MASS INDEX: 46.18 KG/M2 | RESPIRATION RATE: 16 BRPM | OXYGEN SATURATION: 98 % | HEART RATE: 68 BPM | WEIGHT: 244.6 LBS | SYSTOLIC BLOOD PRESSURE: 126 MMHG | HEIGHT: 61 IN

## 2018-12-20 DIAGNOSIS — G47.33 OSA (OBSTRUCTIVE SLEEP APNEA): Primary | ICD-10-CM

## 2018-12-20 PROCEDURE — G8427 DOCREV CUR MEDS BY ELIG CLIN: HCPCS | Performed by: INTERNAL MEDICINE

## 2018-12-20 PROCEDURE — 1036F TOBACCO NON-USER: CPT | Performed by: INTERNAL MEDICINE

## 2018-12-20 PROCEDURE — G8484 FLU IMMUNIZE NO ADMIN: HCPCS | Performed by: INTERNAL MEDICINE

## 2018-12-20 PROCEDURE — G8417 CALC BMI ABV UP PARAM F/U: HCPCS | Performed by: INTERNAL MEDICINE

## 2018-12-20 PROCEDURE — 99204 OFFICE O/P NEW MOD 45 MIN: CPT | Performed by: INTERNAL MEDICINE

## 2018-12-20 ASSESSMENT — ASTHMA QUESTIONNAIRES
QUESTION_3 LAST FOUR WEEKS HOW OFTEN DID YOUR ASTHMA SYMPTOMS (WHEEZING, COUGHING, SHORTNESS OF BREATH, CHEST TIGHTNESS OR PAIN) WAKE YOU UP AT NIGHT OR EARLIER THAN USUAL IN THE MORNING: 2
QUESTION_4 LAST FOUR WEEKS HOW OFTEN HAVE YOU USED YOUR RESCUE INHALER OR NEBULIZER MEDICATION (SUCH AS ALBUTEROL): 3
QUESTION_1 LAST FOUR WEEKS HOW MUCH OF THE TIME DID YOUR ASTHMA KEEP YOU FROM GETTING AS MUCH DONE AT WORK, SCHOOL OR AT HOME: 3
QUESTION_5 LAST FOUR WEEKS HOW WOULD YOU RATE YOUR ASTHMA CONTROL: 3
ACT_TOTALSCORE: 12
QUESTION_2 LAST FOUR WEEKS HOW OFTEN HAVE YOU HAD SHORTNESS OF BREATH: 1

## 2018-12-20 ASSESSMENT — SLEEP AND FATIGUE QUESTIONNAIRES
NECK CIRCUMFERENCE (INCHES): 17
HOW LIKELY ARE YOU TO NOD OFF OR FALL ASLEEP WHILE SITTING QUIETLY AFTER LUNCH WITHOUT ALCOHOL: 0
HOW LIKELY ARE YOU TO NOD OFF OR FALL ASLEEP WHILE SITTING AND READING: 0
HOW LIKELY ARE YOU TO NOD OFF OR FALL ASLEEP WHILE WATCHING TV: 3
HOW LIKELY ARE YOU TO NOD OFF OR FALL ASLEEP WHILE SITTING AND TALKING TO SOMEONE: 0
ESS TOTAL SCORE: 7
HOW LIKELY ARE YOU TO NOD OFF OR FALL ASLEEP WHILE LYING DOWN TO REST IN THE AFTERNOON WHEN CIRCUMSTANCES PERMIT: 2
HOW LIKELY ARE YOU TO NOD OFF OR FALL ASLEEP WHILE SITTING INACTIVE IN A PUBLIC PLACE: 0
HOW LIKELY ARE YOU TO NOD OFF OR FALL ASLEEP WHEN YOU ARE A PASSENGER IN A CAR FOR AN HOUR WITHOUT A BREAK: 2
HOW LIKELY ARE YOU TO NOD OFF OR FALL ASLEEP IN A CAR, WHILE STOPPED FOR A FEW MINUTES IN TRAFFIC: 0
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
## 2018-12-20 NOTE — ASSESSMENT & PLAN NOTE
And sleep study performed at [facility] in 2015 showed mild obstructive sleep apnea, AHI of 11.4, RDI 13.6. O2 diana of 75% with persistent daytime sleepiness, significant nocturnal hypoxemia, multiple nighttime awakenings and nocturia. -To her AHI is less than 15, she does have persistent symptoms consistent with, attributable to, obstructive sleep apnea. -Refer for CPAP titration study, we'll at least need nocturnal supplemental oxygen.  -Advised not to drive while feeling sleepy.

## 2018-12-26 ENCOUNTER — TELEPHONE (OUTPATIENT)
Dept: FAMILY MEDICINE CLINIC | Age: 38
End: 2018-12-26

## 2019-01-08 ENCOUNTER — OFFICE VISIT (OUTPATIENT)
Dept: FAMILY MEDICINE CLINIC | Age: 39
End: 2019-01-08
Payer: MEDICARE

## 2019-01-08 VITALS
BODY MASS INDEX: 47.05 KG/M2 | WEIGHT: 249.2 LBS | SYSTOLIC BLOOD PRESSURE: 120 MMHG | HEIGHT: 61 IN | HEART RATE: 68 BPM | DIASTOLIC BLOOD PRESSURE: 82 MMHG

## 2019-01-08 DIAGNOSIS — R30.9 PAINFUL URINATION: ICD-10-CM

## 2019-01-08 DIAGNOSIS — M54.50 ACUTE MIDLINE LOW BACK PAIN WITHOUT SCIATICA: Primary | ICD-10-CM

## 2019-01-08 LAB
BILIRUBIN, POC: ABNORMAL
BLOOD URINE, POC: ABNORMAL
CLARITY, POC: ABNORMAL
COLOR, POC: YELLOW
GLUCOSE URINE, POC: ABNORMAL
KETONES, POC: ABNORMAL
LEUKOCYTE EST, POC: ABNORMAL
NITRITE, POC: ABNORMAL
PH, POC: 5.5
PROTEIN, POC: ABNORMAL
SPECIFIC GRAVITY, POC: >=1.03
UROBILINOGEN, POC: ABNORMAL

## 2019-01-08 PROCEDURE — 99213 OFFICE O/P EST LOW 20 MIN: CPT | Performed by: NURSE PRACTITIONER

## 2019-01-08 PROCEDURE — 81002 URINALYSIS NONAUTO W/O SCOPE: CPT | Performed by: NURSE PRACTITIONER

## 2019-01-10 ENCOUNTER — TELEPHONE (OUTPATIENT)
Dept: FAMILY MEDICINE CLINIC | Age: 39
End: 2019-01-10

## 2019-01-10 LAB — URINE CULTURE, ROUTINE: NORMAL

## 2019-01-10 NOTE — TELEPHONE ENCOUNTER
PROVIDER NAME:  Dr [facility]  PROVIDER ADDRESS:  [place] [address] [address] [place] [address]  PROVIDER PHONE: [phone]  PROVIDER FAX:  [phone]  PROVIDER NPI:  [id number]  PROVIDER TAX I.D.:  [id number]    DX CODE:  M25.175      CPT CODE:  97941    NUMBER OF VISITS:  12 visits    DATE OF SERVICE:  Tate 10, 2019    CALLER NAME:  Shanell Sanchez

## 2019-01-17 ENCOUNTER — OFFICE VISIT (OUTPATIENT)
Dept: PSYCHIATRY | Age: 39
End: 2019-01-17
Payer: MEDICARE

## 2019-01-17 VITALS
SYSTOLIC BLOOD PRESSURE: 106 MMHG | BODY MASS INDEX: 47.09 KG/M2 | DIASTOLIC BLOOD PRESSURE: 70 MMHG | WEIGHT: 249.4 LBS | HEART RATE: 62 BPM | HEIGHT: 61 IN

## 2019-01-17 DIAGNOSIS — F33.1 MODERATE EPISODE OF RECURRENT MAJOR DEPRESSIVE DISORDER (HCC): ICD-10-CM

## 2019-01-17 DIAGNOSIS — E55.9 VITAMIN D DEFICIENCY: ICD-10-CM

## 2019-01-17 DIAGNOSIS — F70 MILD INTELLECTUAL DISABILITY: ICD-10-CM

## 2019-01-17 DIAGNOSIS — F41.9 ANXIETY: Primary | ICD-10-CM

## 2019-01-17 DIAGNOSIS — F42.2 MIXED OBSESSIONAL THOUGHTS AND ACTS: ICD-10-CM

## 2019-01-17 PROCEDURE — 99214 OFFICE O/P EST MOD 30 MIN: CPT | Performed by: NURSE PRACTITIONER

## 2019-01-17 RX ORDER — ESCITALOPRAM OXALATE 10 MG/1
10 TABLET ORAL DAILY
Qty: 30 TABLET | Refills: 3 | Status: SHIPPED | OUTPATIENT
Start: 2019-01-17 | End: 2019-03-21 | Stop reason: SDUPTHER

## 2019-01-17 RX ORDER — LORAZEPAM 0.5 MG/1
0.5 TABLET ORAL 2 TIMES DAILY PRN
Qty: 60 TABLET | Refills: 0 | Status: SHIPPED | OUTPATIENT
Start: 2019-01-17 | End: 2019-02-13 | Stop reason: SDUPTHER

## 2019-01-17 ASSESSMENT — ANXIETY QUESTIONNAIRES
6. BECOMING EASILY ANNOYED OR IRRITABLE: 3-NEARLY EVERY DAY
5. BEING SO RESTLESS THAT IT IS HARD TO SIT STILL: 3-NEARLY EVERY DAY
GAD7 TOTAL SCORE: 14
4. TROUBLE RELAXING: 3-NEARLY EVERY DAY
7. FEELING AFRAID AS IF SOMETHING AWFUL MIGHT HAPPEN: 0-NOT AT ALL SURE
2. NOT BEING ABLE TO STOP OR CONTROL WORRYING: 1-SEVERAL DAYS
3. WORRYING TOO MUCH ABOUT DIFFERENT THINGS: 1-SEVERAL DAYS
1. FEELING NERVOUS, ANXIOUS, OR ON EDGE: 3-NEARLY EVERY DAY

## 2019-01-17 ASSESSMENT — PATIENT HEALTH QUESTIONNAIRE - PHQ9
4. FEELING TIRED OR HAVING LITTLE ENERGY: 1
8. MOVING OR SPEAKING SO SLOWLY THAT OTHER PEOPLE COULD HAVE NOTICED. OR THE OPPOSITE, BEING SO FIGETY OR RESTLESS THAT YOU HAVE BEEN MOVING AROUND A LOT MORE THAN USUAL: 0
5. POOR APPETITE OR OVEREATING: 0
6. FEELING BAD ABOUT YOURSELF - OR THAT YOU ARE A FAILURE OR HAVE LET YOURSELF OR YOUR FAMILY DOWN: 0
10. IF YOU CHECKED OFF ANY PROBLEMS, HOW DIFFICULT HAVE THESE PROBLEMS MADE IT FOR YOU TO DO YOUR WORK, TAKE CARE OF THINGS AT HOME, OR GET ALONG WITH OTHER PEOPLE: 3
3. TROUBLE FALLING OR STAYING ASLEEP: 3
9. THOUGHTS THAT YOU WOULD BE BETTER OFF DEAD, OR OF HURTING YOURSELF: 0
SUM OF ALL RESPONSES TO PHQ9 QUESTIONS 1 & 2: 3
1. LITTLE INTEREST OR PLEASURE IN DOING THINGS: 1
SUM OF ALL RESPONSES TO PHQ QUESTIONS 1-9: 10
SUM OF ALL RESPONSES TO PHQ QUESTIONS 1-9: 10
7. TROUBLE CONCENTRATING ON THINGS, SUCH AS READING THE NEWSPAPER OR WATCHING TELEVISION: 3
2. FEELING DOWN, DEPRESSED OR HOPELESS: 2

## 2019-02-01 ENCOUNTER — TELEPHONE (OUTPATIENT)
Dept: PULMONOLOGY | Age: 39
End: 2019-02-01

## 2019-02-05 ENCOUNTER — TELEPHONE (OUTPATIENT)
Dept: FAMILY MEDICINE CLINIC | Age: 39
End: 2019-02-05

## 2019-02-05 NOTE — TELEPHONE ENCOUNTER
I DO NOT HAVE THE RESULTS OF THE [employer] TESTING- HAS SHE SPOKEN TO MIROSLAVA- IF NOT WHEN IS HER NEXT APPT- CAN SEE ME IF SHE CANT SEE MIROSLAVA

## 2019-02-09 ENCOUNTER — APPOINTMENT (OUTPATIENT)
Dept: GENERAL RADIOLOGY | Age: 39
End: 2019-02-09
Payer: MEDICARE

## 2019-02-09 ENCOUNTER — HOSPITAL ENCOUNTER (EMERGENCY)
Age: 39
Discharge: HOME | End: 2019-02-09
Payer: MEDICARE

## 2019-02-09 VITALS
HEART RATE: 87 BPM | TEMPERATURE: 98.3 F | SYSTOLIC BLOOD PRESSURE: 173 MMHG | RESPIRATION RATE: 15 BRPM | BODY MASS INDEX: 47.2 KG/M2 | HEIGHT: 61 IN | WEIGHT: 250 LBS | DIASTOLIC BLOOD PRESSURE: 101 MMHG | OXYGEN SATURATION: 100 %

## 2019-02-09 DIAGNOSIS — B34.9 VIRAL ILLNESS: Primary | ICD-10-CM

## 2019-02-09 LAB
RAPID INFLUENZA  B AGN: NEGATIVE
RAPID INFLUENZA A AGN: NEGATIVE

## 2019-02-09 PROCEDURE — 71046 X-RAY EXAM CHEST 2 VIEWS: CPT

## 2019-02-09 PROCEDURE — 99283 EMERGENCY DEPT VISIT LOW MDM: CPT

## 2019-02-09 PROCEDURE — 87804 INFLUENZA ASSAY W/OPTIC: CPT

## 2019-02-09 PROCEDURE — 6370000000 HC RX 637 (ALT 250 FOR IP): Performed by: PHYSICIAN ASSISTANT

## 2019-02-09 RX ORDER — IBUPROFEN 800 MG/1
800 TABLET ORAL ONCE
Status: COMPLETED | OUTPATIENT
Start: 2019-02-09 | End: 2019-02-09

## 2019-02-09 RX ORDER — GUAIFENESIN/DEXTROMETHORPHAN 100-10MG/5
5 SYRUP ORAL 3 TIMES DAILY PRN
Qty: 120 ML | Refills: 0 | Status: SHIPPED | OUTPATIENT
Start: 2019-02-09 | End: 2019-02-19

## 2019-02-09 RX ADMIN — IBUPROFEN 800 MG: 800 TABLET, FILM COATED ORAL at 20:06

## 2019-02-09 ASSESSMENT — PAIN SCALES - GENERAL
PAINLEVEL_OUTOF10: 8
PAINLEVEL_OUTOF10: 8

## 2019-02-09 ASSESSMENT — ENCOUNTER SYMPTOMS
CONSTIPATION: 0
SORE THROAT: 0
WHEEZING: 0
SHORTNESS OF BREATH: 0
DIARRHEA: 0
NAUSEA: 0
ABDOMINAL PAIN: 0
RHINORRHEA: 0
BACK PAIN: 0
VOMITING: 0
TROUBLE SWALLOWING: 0
SINUS PAIN: 0
SINUS PRESSURE: 0
COUGH: 1
COLOR CHANGE: 0

## 2019-02-12 ENCOUNTER — TELEPHONE (OUTPATIENT)
Dept: FAMILY MEDICINE CLINIC | Age: 39
End: 2019-02-12

## 2019-02-13 DIAGNOSIS — F41.9 ANXIETY: ICD-10-CM

## 2019-02-15 ENCOUNTER — OFFICE VISIT (OUTPATIENT)
Dept: FAMILY MEDICINE CLINIC | Age: 39
End: 2019-02-15
Payer: MEDICARE

## 2019-02-15 VITALS
SYSTOLIC BLOOD PRESSURE: 122 MMHG | RESPIRATION RATE: 18 BRPM | WEIGHT: 254.4 LBS | TEMPERATURE: 98.1 F | HEART RATE: 70 BPM | OXYGEN SATURATION: 98 % | DIASTOLIC BLOOD PRESSURE: 72 MMHG | HEIGHT: 61 IN | BODY MASS INDEX: 48.03 KG/M2

## 2019-02-15 DIAGNOSIS — T14.8XXA SPRAIN: ICD-10-CM

## 2019-02-15 DIAGNOSIS — M54.5 ACUTE MIDLINE LOW BACK PAIN, WITH SCIATICA PRESENCE UNSPECIFIED: Primary | ICD-10-CM

## 2019-02-15 PROCEDURE — 99213 OFFICE O/P EST LOW 20 MIN: CPT | Performed by: NURSE PRACTITIONER

## 2019-02-15 RX ORDER — PREDNISONE 10 MG/1
TABLET ORAL
Qty: 20 TABLET | Refills: 0 | Status: SHIPPED | OUTPATIENT
Start: 2019-02-15 | End: 2019-03-05

## 2019-02-15 ASSESSMENT — ENCOUNTER SYMPTOMS: BACK PAIN: 1

## 2019-02-19 RX ORDER — LORAZEPAM 0.5 MG/1
TABLET ORAL
Qty: 60 TABLET | Refills: 0 | Status: SHIPPED | OUTPATIENT
Start: 2019-02-19 | End: 2019-03-21

## 2019-02-21 ENCOUNTER — APPOINTMENT (OUTPATIENT)
Dept: PHYSICAL THERAPY | Age: 39
End: 2019-02-21
Payer: MEDICARE

## 2019-02-22 ENCOUNTER — TELEPHONE (OUTPATIENT)
Dept: FAMILY MEDICINE CLINIC | Age: 39
End: 2019-02-22

## 2019-02-22 ENCOUNTER — OFFICE VISIT (OUTPATIENT)
Dept: FAMILY MEDICINE CLINIC | Age: 39
End: 2019-02-22
Payer: MEDICARE

## 2019-02-22 VITALS
RESPIRATION RATE: 18 BRPM | OXYGEN SATURATION: 97 % | DIASTOLIC BLOOD PRESSURE: 80 MMHG | BODY MASS INDEX: 47.8 KG/M2 | HEART RATE: 93 BPM | WEIGHT: 253 LBS | SYSTOLIC BLOOD PRESSURE: 122 MMHG | TEMPERATURE: 98.4 F

## 2019-02-22 DIAGNOSIS — J30.9 ALLERGIC RHINITIS, UNSPECIFIED SEASONALITY, UNSPECIFIED TRIGGER: Primary | ICD-10-CM

## 2019-02-22 DIAGNOSIS — J01.00 ACUTE MAXILLARY SINUSITIS, RECURRENCE NOT SPECIFIED: ICD-10-CM

## 2019-02-22 PROCEDURE — 99213 OFFICE O/P EST LOW 20 MIN: CPT | Performed by: NURSE PRACTITIONER

## 2019-02-22 RX ORDER — IPRATROPIUM BROMIDE 42 UG/1
2 SPRAY, METERED NASAL 4 TIMES DAILY
Qty: 1 BOTTLE | Refills: 3 | Status: SHIPPED | OUTPATIENT
Start: 2019-02-22 | End: 2019-03-22 | Stop reason: ALTCHOICE

## 2019-02-22 RX ORDER — AZITHROMYCIN 250 MG/1
250 TABLET, FILM COATED ORAL SEE ADMIN INSTRUCTIONS
Qty: 6 TABLET | Refills: 0 | Status: SHIPPED | OUTPATIENT
Start: 2019-02-22 | End: 2019-02-27

## 2019-02-22 ASSESSMENT — ENCOUNTER SYMPTOMS
SINUS PAIN: 0
SINUS PRESSURE: 0

## 2019-02-25 ENCOUNTER — HOSPITAL ENCOUNTER (OUTPATIENT)
Dept: PHYSICAL THERAPY | Age: 39
Setting detail: THERAPIES SERIES
Discharge: HOME | End: 2019-02-25
Payer: MEDICARE

## 2019-02-25 PROCEDURE — 97110 THERAPEUTIC EXERCISES: CPT

## 2019-02-25 PROCEDURE — 97161 PT EVAL LOW COMPLEX 20 MIN: CPT

## 2019-02-25 PROCEDURE — 97530 THERAPEUTIC ACTIVITIES: CPT

## 2019-02-25 NOTE — TELEPHONE ENCOUNTER
I called and spoke with pt   I explained again we can mail her a copy out but it is 18 pages and not easy to follow   Not something we can go over on the phone.  Pt agreed and stated she will  a copy at her appt on 3/7/19 with Jackie Lan   She is ok to wait until then

## 2019-02-26 ENCOUNTER — TELEPHONE (OUTPATIENT)
Dept: PULMONOLOGY | Age: 39
End: 2019-02-26

## 2019-03-05 ENCOUNTER — HOSPITAL ENCOUNTER (EMERGENCY)
Age: 39
Discharge: HOME | End: 2019-03-05
Attending: EMERGENCY MEDICINE
Payer: MEDICARE

## 2019-03-05 VITALS
RESPIRATION RATE: 19 BRPM | OXYGEN SATURATION: 97 % | DIASTOLIC BLOOD PRESSURE: 72 MMHG | TEMPERATURE: 98 F | WEIGHT: 253 LBS | BODY MASS INDEX: 39.71 KG/M2 | SYSTOLIC BLOOD PRESSURE: 124 MMHG | HEIGHT: 67 IN | HEART RATE: 91 BPM

## 2019-03-05 DIAGNOSIS — R52 GENERALIZED BODY ACHES: ICD-10-CM

## 2019-03-05 DIAGNOSIS — R11.2 NAUSEA VOMITING AND DIARRHEA: Primary | ICD-10-CM

## 2019-03-05 DIAGNOSIS — R19.7 NAUSEA VOMITING AND DIARRHEA: Primary | ICD-10-CM

## 2019-03-05 LAB
A/G RATIO: 1 (ref 1.1–2.2)
ALBUMIN SERPL-MCNC: 4.3 G/DL (ref 3.4–5)
ALP BLD-CCNC: 100 U/L (ref 40–129)
ALT SERPL-CCNC: 19 U/L (ref 10–40)
ANION GAP SERPL CALCULATED.3IONS-SCNC: 11 MMOL/L (ref 3–16)
AST SERPL-CCNC: 15 U/L (ref 15–37)
BASOPHILS ABSOLUTE: 0 K/UL (ref 0–0.2)
BASOPHILS RELATIVE PERCENT: 0.2 %
BILIRUB SERPL-MCNC: 0.8 MG/DL (ref 0–1)
BILIRUBIN URINE: NEGATIVE
BLOOD, URINE: NEGATIVE
BUN BLDV-MCNC: 12 MG/DL (ref 7–20)
CALCIUM SERPL-MCNC: 9.2 MG/DL (ref 8.3–10.6)
CHLORIDE BLD-SCNC: 97 MMOL/L (ref 99–110)
CLARITY: CLEAR
CO2: 26 MMOL/L (ref 21–32)
COLOR: YELLOW
CREAT SERPL-MCNC: 0.7 MG/DL (ref 0.6–1.1)
EKG ATRIAL RATE: 90 BPM
EKG DIAGNOSIS: NORMAL
EKG P AXIS: 52 DEGREES
EKG P-R INTERVAL: 136 MS
EKG Q-T INTERVAL: 372 MS
EKG QRS DURATION: 82 MS
EKG QTC CALCULATION (BAZETT): 455 MS
EKG R AXIS: 0 DEGREES
EKG T AXIS: 20 DEGREES
EKG VENTRICULAR RATE: 90 BPM
EOSINOPHILS ABSOLUTE: 0 K/UL (ref 0–0.6)
EOSINOPHILS RELATIVE PERCENT: 0.1 %
GFR AFRICAN AMERICAN: >60
GFR NON-AFRICAN AMERICAN: >60
GLOBULIN: 4.2 G/DL
GLUCOSE BLD-MCNC: 107 MG/DL (ref 70–99)
GLUCOSE URINE: NEGATIVE MG/DL
HCG QUALITATIVE: NEGATIVE
HCT VFR BLD CALC: 39.5 % (ref 36–48)
HEMOGLOBIN: 13.1 G/DL (ref 12–16)
KETONES, URINE: NEGATIVE MG/DL
LEUKOCYTE ESTERASE, URINE: NEGATIVE
LIPASE: 26 U/L (ref 13–60)
LYMPHOCYTES ABSOLUTE: 0.7 K/UL (ref 1–5.1)
LYMPHOCYTES RELATIVE PERCENT: 7 %
MCH RBC QN AUTO: 28 PG (ref 26–34)
MCHC RBC AUTO-ENTMCNC: 33.1 G/DL (ref 31–36)
MCV RBC AUTO: 84.6 FL (ref 80–100)
MICROSCOPIC EXAMINATION: NORMAL
MONOCYTES ABSOLUTE: 0.4 K/UL (ref 0–1.3)
MONOCYTES RELATIVE PERCENT: 3.9 %
NEUTROPHILS ABSOLUTE: 8.4 K/UL (ref 1.7–7.7)
NEUTROPHILS RELATIVE PERCENT: 88.8 %
NITRITE, URINE: NEGATIVE
PDW BLD-RTO: 15.2 % (ref 12.4–15.4)
PH UA: 5.5 (ref 5–8)
PLATELET # BLD: 241 K/UL (ref 135–450)
PMV BLD AUTO: 6.8 FL (ref 5–10.5)
POTASSIUM SERPL-SCNC: 4.4 MMOL/L (ref 3.5–5.1)
PROTEIN UA: NEGATIVE MG/DL
RBC # BLD: 4.67 M/UL (ref 4–5.2)
SODIUM BLD-SCNC: 134 MMOL/L (ref 136–145)
SPECIFIC GRAVITY UA: 1.02 (ref 1–1.03)
TOTAL PROTEIN: 8.5 G/DL (ref 6.4–8.2)
URINE REFLEX TO CULTURE: NORMAL
URINE TYPE: NORMAL
UROBILINOGEN, URINE: 0.2 E.U./DL
WBC # BLD: 9.5 K/UL (ref 4–11)

## 2019-03-05 PROCEDURE — 96361 HYDRATE IV INFUSION ADD-ON: CPT

## 2019-03-05 PROCEDURE — 2580000003 HC RX 258: Performed by: PHYSICIAN ASSISTANT

## 2019-03-05 PROCEDURE — 93010 ELECTROCARDIOGRAM REPORT: CPT | Performed by: INTERNAL MEDICINE

## 2019-03-05 PROCEDURE — 84703 CHORIONIC GONADOTROPIN ASSAY: CPT

## 2019-03-05 PROCEDURE — 81003 URINALYSIS AUTO W/O SCOPE: CPT

## 2019-03-05 PROCEDURE — 83690 ASSAY OF LIPASE: CPT

## 2019-03-05 PROCEDURE — 80053 COMPREHEN METABOLIC PANEL: CPT

## 2019-03-05 PROCEDURE — 96374 THER/PROPH/DIAG INJ IV PUSH: CPT

## 2019-03-05 PROCEDURE — 96375 TX/PRO/DX INJ NEW DRUG ADDON: CPT

## 2019-03-05 PROCEDURE — 6360000002 HC RX W HCPCS: Performed by: PHYSICIAN ASSISTANT

## 2019-03-05 PROCEDURE — 93005 ELECTROCARDIOGRAM TRACING: CPT | Performed by: EMERGENCY MEDICINE

## 2019-03-05 PROCEDURE — 85025 COMPLETE CBC W/AUTO DIFF WBC: CPT

## 2019-03-05 PROCEDURE — 99284 EMERGENCY DEPT VISIT MOD MDM: CPT

## 2019-03-05 RX ORDER — KETOROLAC TROMETHAMINE 30 MG/ML
30 INJECTION, SOLUTION INTRAMUSCULAR; INTRAVENOUS ONCE
Status: COMPLETED | OUTPATIENT
Start: 2019-03-05 | End: 2019-03-05

## 2019-03-05 RX ORDER — 0.9 % SODIUM CHLORIDE 0.9 %
1000 INTRAVENOUS SOLUTION INTRAVENOUS ONCE
Status: COMPLETED | OUTPATIENT
Start: 2019-03-05 | End: 2019-03-05

## 2019-03-05 RX ORDER — DICYCLOMINE HYDROCHLORIDE 10 MG/ML
20 INJECTION INTRAMUSCULAR ONCE
Status: COMPLETED | OUTPATIENT
Start: 2019-03-05 | End: 2019-03-05

## 2019-03-05 RX ORDER — DICYCLOMINE HYDROCHLORIDE 10 MG/1
10 CAPSULE ORAL EVERY 6 HOURS PRN
Qty: 20 CAPSULE | Refills: 0 | Status: SHIPPED | OUTPATIENT
Start: 2019-03-05 | End: 2019-03-22 | Stop reason: ALTCHOICE

## 2019-03-05 RX ORDER — ONDANSETRON 2 MG/ML
4 INJECTION INTRAMUSCULAR; INTRAVENOUS ONCE
Status: COMPLETED | OUTPATIENT
Start: 2019-03-05 | End: 2019-03-05

## 2019-03-05 RX ORDER — ONDANSETRON 4 MG/1
4 TABLET, ORALLY DISINTEGRATING ORAL EVERY 8 HOURS PRN
Qty: 20 TABLET | Refills: 0 | Status: SHIPPED | OUTPATIENT
Start: 2019-03-05 | End: 2019-03-27 | Stop reason: ALTCHOICE

## 2019-03-05 RX ADMIN — ONDANSETRON 4 MG: 2 INJECTION INTRAMUSCULAR; INTRAVENOUS at 12:15

## 2019-03-05 RX ADMIN — SODIUM CHLORIDE 1000 ML: 9 INJECTION, SOLUTION INTRAVENOUS at 12:14

## 2019-03-05 RX ADMIN — KETOROLAC TROMETHAMINE 30 MG: 30 INJECTION, SOLUTION INTRAMUSCULAR at 12:15

## 2019-03-05 RX ADMIN — DICYCLOMINE HYDROCHLORIDE 20 MG: 20 INJECTION, SOLUTION INTRAMUSCULAR at 12:14

## 2019-03-05 ASSESSMENT — PAIN SCALES - GENERAL
PAINLEVEL_OUTOF10: 10
PAINLEVEL_OUTOF10: 10
PAINLEVEL_OUTOF10: 3

## 2019-03-05 ASSESSMENT — ENCOUNTER SYMPTOMS
RHINORRHEA: 0
VOMITING: 1
DIARRHEA: 1
SHORTNESS OF BREATH: 0
BLOOD IN STOOL: 0
NAUSEA: 1
COUGH: 0
ABDOMINAL PAIN: 0

## 2019-03-05 ASSESSMENT — PAIN DESCRIPTION - LOCATION: LOCATION: GENERALIZED;CHEST

## 2019-03-06 ENCOUNTER — PATIENT MESSAGE (OUTPATIENT)
Dept: FAMILY MEDICINE CLINIC | Age: 39
End: 2019-03-06

## 2019-03-06 ENCOUNTER — HOSPITAL ENCOUNTER (OUTPATIENT)
Dept: PHYSICAL THERAPY | Age: 39
Setting detail: THERAPIES SERIES
Discharge: HOME | End: 2019-03-06
Payer: MEDICARE

## 2019-03-07 ENCOUNTER — OFFICE VISIT (OUTPATIENT)
Dept: ORTHOPEDIC SURGERY | Age: 39
End: 2019-03-07
Payer: MEDICARE

## 2019-03-07 VITALS
BODY MASS INDEX: 39.72 KG/M2 | DIASTOLIC BLOOD PRESSURE: 83 MMHG | SYSTOLIC BLOOD PRESSURE: 131 MMHG | HEART RATE: 68 BPM | WEIGHT: 253.09 LBS | HEIGHT: 67 IN

## 2019-03-07 DIAGNOSIS — B07.0 PLANTAR WART OF RIGHT FOOT: ICD-10-CM

## 2019-03-07 DIAGNOSIS — M72.2 PLANTAR FASCIITIS, LEFT: ICD-10-CM

## 2019-03-07 DIAGNOSIS — M79.672 PAIN OF LEFT HEEL: Primary | ICD-10-CM

## 2019-03-07 PROCEDURE — L4361 PNEUMA/VAC WALK BOOT PRE OTS: HCPCS | Performed by: PODIATRIST

## 2019-03-07 PROCEDURE — 99203 OFFICE O/P NEW LOW 30 MIN: CPT | Performed by: PODIATRIST

## 2019-03-07 PROCEDURE — L3040 FT ARCH SUPRT PREMOLD LONGIT: HCPCS | Performed by: PODIATRIST

## 2019-03-07 RX ORDER — ZOLMITRIPTAN 2.5 MG/1
SPRAY, METERED NASAL
Qty: 6 EACH | Refills: 1 | Status: SHIPPED | OUTPATIENT
Start: 2019-03-07 | End: 2019-09-13 | Stop reason: ALTCHOICE

## 2019-03-08 ENCOUNTER — TELEPHONE (OUTPATIENT)
Dept: ORTHOPEDIC SURGERY | Age: 39
End: 2019-03-08

## 2019-03-12 ENCOUNTER — TELEPHONE (OUTPATIENT)
Dept: PULMONOLOGY | Age: 39
End: 2019-03-12

## 2019-03-18 ENCOUNTER — TELEPHONE (OUTPATIENT)
Dept: FAMILY MEDICINE CLINIC | Age: 39
End: 2019-03-18

## 2019-03-18 ENCOUNTER — PATIENT MESSAGE (OUTPATIENT)
Dept: FAMILY MEDICINE CLINIC | Age: 39
End: 2019-03-18

## 2019-03-18 RX ORDER — ALBUTEROL SULFATE 90 UG/1
2 AEROSOL, METERED RESPIRATORY (INHALATION) EVERY 6 HOURS PRN
Qty: 1 INHALER | Refills: 0 | Status: SHIPPED | OUTPATIENT
Start: 2019-03-18 | End: 2019-03-19

## 2019-03-19 ENCOUNTER — APPOINTMENT (OUTPATIENT)
Dept: GENERAL RADIOLOGY | Age: 39
End: 2019-03-19
Payer: MEDICARE

## 2019-03-19 ENCOUNTER — HOSPITAL ENCOUNTER (EMERGENCY)
Age: 39
Discharge: HOME | End: 2019-03-19
Payer: MEDICARE

## 2019-03-19 ENCOUNTER — HOSPITAL ENCOUNTER (OUTPATIENT)
Age: 39
Discharge: HOME | End: 2019-03-19
Payer: MEDICARE

## 2019-03-19 VITALS
RESPIRATION RATE: 18 BRPM | DIASTOLIC BLOOD PRESSURE: 86 MMHG | BODY MASS INDEX: 39.15 KG/M2 | OXYGEN SATURATION: 98 % | WEIGHT: 250 LBS | SYSTOLIC BLOOD PRESSURE: 158 MMHG | TEMPERATURE: 98.1 F | HEART RATE: 96 BPM

## 2019-03-19 DIAGNOSIS — J40 BRONCHITIS: Primary | ICD-10-CM

## 2019-03-19 LAB
A/G RATIO: 1.1 (ref 1.1–2.2)
ALBUMIN SERPL-MCNC: 4.4 G/DL (ref 3.4–5)
ALP BLD-CCNC: 112 U/L (ref 40–129)
ALT SERPL-CCNC: 30 U/L (ref 10–40)
ANION GAP SERPL CALCULATED.3IONS-SCNC: 11 MMOL/L (ref 3–16)
AST SERPL-CCNC: 24 U/L (ref 15–37)
BASOPHILS ABSOLUTE: 0.1 K/UL (ref 0–0.2)
BASOPHILS RELATIVE PERCENT: 0.5 %
BILIRUB SERPL-MCNC: 0.3 MG/DL (ref 0–1)
BUN BLDV-MCNC: 12 MG/DL (ref 7–20)
CALCIUM SERPL-MCNC: 9.5 MG/DL (ref 8.3–10.6)
CHLORIDE BLD-SCNC: 98 MMOL/L (ref 99–110)
CO2: 30 MMOL/L (ref 21–32)
CREAT SERPL-MCNC: 0.6 MG/DL (ref 0.6–1.1)
EOSINOPHILS ABSOLUTE: 0.1 K/UL (ref 0–0.6)
EOSINOPHILS RELATIVE PERCENT: 1.1 %
GFR AFRICAN AMERICAN: >60
GFR NON-AFRICAN AMERICAN: >60
GLOBULIN: 4.1 G/DL
GLUCOSE BLD-MCNC: 139 MG/DL (ref 70–99)
HCT VFR BLD CALC: 37.7 % (ref 36–48)
HEMOGLOBIN: 12.6 G/DL (ref 12–16)
LYMPHOCYTES ABSOLUTE: 2.6 K/UL (ref 1–5.1)
LYMPHOCYTES RELATIVE PERCENT: 25.1 %
MCH RBC QN AUTO: 28.2 PG (ref 26–34)
MCHC RBC AUTO-ENTMCNC: 33.3 G/DL (ref 31–36)
MCV RBC AUTO: 84.6 FL (ref 80–100)
MONOCYTES ABSOLUTE: 0.9 K/UL (ref 0–1.3)
MONOCYTES RELATIVE PERCENT: 8.1 %
NEUTROPHILS ABSOLUTE: 6.9 K/UL (ref 1.7–7.7)
NEUTROPHILS RELATIVE PERCENT: 65.2 %
PDW BLD-RTO: 15.2 % (ref 12.4–15.4)
PLATELET # BLD: 308 K/UL (ref 135–450)
PMV BLD AUTO: 7.2 FL (ref 5–10.5)
POTASSIUM SERPL-SCNC: 4 MMOL/L (ref 3.5–5.1)
RBC # BLD: 4.46 M/UL (ref 4–5.2)
SODIUM BLD-SCNC: 139 MMOL/L (ref 136–145)
TOTAL PROTEIN: 8.5 G/DL (ref 6.4–8.2)
TSH SERPL DL<=0.05 MIU/L-ACNC: 1.07 UIU/ML (ref 0.27–4.2)
WBC # BLD: 10.6 K/UL (ref 4–11)

## 2019-03-19 PROCEDURE — 80053 COMPREHEN METABOLIC PANEL: CPT

## 2019-03-19 PROCEDURE — 84443 ASSAY THYROID STIM HORMONE: CPT

## 2019-03-19 PROCEDURE — 6360000002 HC RX W HCPCS: Performed by: NURSE PRACTITIONER

## 2019-03-19 PROCEDURE — 94640 AIRWAY INHALATION TREATMENT: CPT

## 2019-03-19 PROCEDURE — 94760 N-INVAS EAR/PLS OXIMETRY 1: CPT

## 2019-03-19 PROCEDURE — 99283 EMERGENCY DEPT VISIT LOW MDM: CPT

## 2019-03-19 PROCEDURE — 85025 COMPLETE CBC W/AUTO DIFF WBC: CPT

## 2019-03-19 PROCEDURE — 36415 COLL VENOUS BLD VENIPUNCTURE: CPT

## 2019-03-19 PROCEDURE — 71046 X-RAY EXAM CHEST 2 VIEWS: CPT

## 2019-03-19 RX ORDER — GUAIFENESIN/DEXTROMETHORPHAN 100-10MG/5
5 SYRUP ORAL 3 TIMES DAILY PRN
Qty: 120 ML | Refills: 0 | Status: SHIPPED | OUTPATIENT
Start: 2019-03-19 | End: 2019-03-29

## 2019-03-19 RX ORDER — ALBUTEROL SULFATE 2.5 MG/3ML
5 SOLUTION RESPIRATORY (INHALATION) ONCE
Status: COMPLETED | OUTPATIENT
Start: 2019-03-19 | End: 2019-03-19

## 2019-03-19 RX ORDER — ALBUTEROL SULFATE 90 UG/1
2 AEROSOL, METERED RESPIRATORY (INHALATION) EVERY 4 HOURS PRN
Qty: 1 INHALER | Refills: 0 | Status: SHIPPED | OUTPATIENT
Start: 2019-03-19 | End: 2019-06-05 | Stop reason: SDUPTHER

## 2019-03-19 RX ORDER — PREDNISONE 10 MG/1
60 TABLET ORAL DAILY
Qty: 30 TABLET | Refills: 0 | Status: SHIPPED | OUTPATIENT
Start: 2019-03-19 | End: 2019-03-24

## 2019-03-19 RX ADMIN — ALBUTEROL SULFATE 5 MG: 2.5 SOLUTION RESPIRATORY (INHALATION) at 15:43

## 2019-03-19 ASSESSMENT — ENCOUNTER SYMPTOMS
VOMITING: 0
COUGH: 1
SHORTNESS OF BREATH: 1
RHINORRHEA: 1
CHEST TIGHTNESS: 1
DIARRHEA: 0
NAUSEA: 0
EYE DISCHARGE: 1
WHEEZING: 1
ABDOMINAL PAIN: 0

## 2019-03-21 ENCOUNTER — OFFICE VISIT (OUTPATIENT)
Dept: PSYCHIATRY | Age: 39
End: 2019-03-21
Payer: MEDICARE

## 2019-03-21 ENCOUNTER — OFFICE VISIT (OUTPATIENT)
Dept: FAMILY MEDICINE CLINIC | Age: 39
End: 2019-03-21
Payer: MEDICARE

## 2019-03-21 VITALS
OXYGEN SATURATION: 100 % | DIASTOLIC BLOOD PRESSURE: 88 MMHG | TEMPERATURE: 97.4 F | SYSTOLIC BLOOD PRESSURE: 132 MMHG | RESPIRATION RATE: 18 BRPM | HEIGHT: 61 IN | WEIGHT: 257.4 LBS | BODY MASS INDEX: 48.6 KG/M2 | HEART RATE: 100 BPM

## 2019-03-21 VITALS
HEIGHT: 61 IN | WEIGHT: 252.2 LBS | HEART RATE: 76 BPM | DIASTOLIC BLOOD PRESSURE: 76 MMHG | BODY MASS INDEX: 47.62 KG/M2 | SYSTOLIC BLOOD PRESSURE: 122 MMHG

## 2019-03-21 DIAGNOSIS — R06.02 SHORTNESS OF BREATH: ICD-10-CM

## 2019-03-21 DIAGNOSIS — E55.9 VITAMIN D DEFICIENCY: Primary | ICD-10-CM

## 2019-03-21 DIAGNOSIS — R06.2 WHEEZING: ICD-10-CM

## 2019-03-21 DIAGNOSIS — R94.2 DIFFUSION CAPACITY OF LUNG (DL), DECREASED: ICD-10-CM

## 2019-03-21 DIAGNOSIS — R93.89 ABNORMAL CXR: ICD-10-CM

## 2019-03-21 DIAGNOSIS — F41.9 ANXIETY: ICD-10-CM

## 2019-03-21 DIAGNOSIS — F42.2 MIXED OBSESSIONAL THOUGHTS AND ACTS: ICD-10-CM

## 2019-03-21 DIAGNOSIS — F33.1 MODERATE EPISODE OF RECURRENT MAJOR DEPRESSIVE DISORDER (HCC): ICD-10-CM

## 2019-03-21 DIAGNOSIS — F70 MILD INTELLECTUAL DISABILITY: ICD-10-CM

## 2019-03-21 DIAGNOSIS — I10 HTN (HYPERTENSION), BENIGN: Primary | ICD-10-CM

## 2019-03-21 PROCEDURE — 99214 OFFICE O/P EST MOD 30 MIN: CPT | Performed by: NURSE PRACTITIONER

## 2019-03-21 RX ORDER — LISINOPRIL AND HYDROCHLOROTHIAZIDE 12.5; 1 MG/1; MG/1
1 TABLET ORAL DAILY
Qty: 30 TABLET | Refills: 2 | Status: SHIPPED | OUTPATIENT
Start: 2019-03-21 | End: 2019-04-11

## 2019-03-21 RX ORDER — ESCITALOPRAM OXALATE 10 MG/1
10 TABLET ORAL DAILY
Qty: 30 TABLET | Refills: 3 | Status: SHIPPED | OUTPATIENT
Start: 2019-03-21 | End: 2019-05-17 | Stop reason: SDUPTHER

## 2019-03-21 RX ORDER — LEVOMEFOLATE/ALGAL OIL 15-90.314
15 CAPSULE ORAL DAILY
Qty: 30 CAPSULE | Refills: 3 | Status: SHIPPED | OUTPATIENT
Start: 2019-03-21 | End: 2019-05-21 | Stop reason: SDUPTHER

## 2019-03-21 RX ORDER — LORAZEPAM 0.5 MG/1
0.5 TABLET ORAL DAILY PRN
Qty: 30 TABLET | Refills: 1 | Status: SHIPPED | OUTPATIENT
Start: 2019-03-21 | End: 2019-05-21 | Stop reason: SDUPTHER

## 2019-03-21 RX ORDER — ERGOCALCIFEROL (VITAMIN D2) 50 MCG
2000 CAPSULE ORAL DAILY
Qty: 30 CAPSULE | Refills: 3 | Status: SHIPPED | OUTPATIENT
Start: 2019-03-21 | End: 2019-04-11 | Stop reason: ALTCHOICE

## 2019-03-21 ASSESSMENT — PATIENT HEALTH QUESTIONNAIRE - PHQ9
2. FEELING DOWN, DEPRESSED OR HOPELESS: 3
SUM OF ALL RESPONSES TO PHQ QUESTIONS 1-9: 8
6. FEELING BAD ABOUT YOURSELF - OR THAT YOU ARE A FAILURE OR HAVE LET YOURSELF OR YOUR FAMILY DOWN: 0
SUM OF ALL RESPONSES TO PHQ QUESTIONS 1-9: 8
3. TROUBLE FALLING OR STAYING ASLEEP: 0
8. MOVING OR SPEAKING SO SLOWLY THAT OTHER PEOPLE COULD HAVE NOTICED. OR THE OPPOSITE, BEING SO FIGETY OR RESTLESS THAT YOU HAVE BEEN MOVING AROUND A LOT MORE THAN USUAL: 0
7. TROUBLE CONCENTRATING ON THINGS, SUCH AS READING THE NEWSPAPER OR WATCHING TELEVISION: 3
SUM OF ALL RESPONSES TO PHQ9 QUESTIONS 1 & 2: 5
4. FEELING TIRED OR HAVING LITTLE ENERGY: 0
1. LITTLE INTEREST OR PLEASURE IN DOING THINGS: 2
9. THOUGHTS THAT YOU WOULD BE BETTER OFF DEAD, OR OF HURTING YOURSELF: 0
10. IF YOU CHECKED OFF ANY PROBLEMS, HOW DIFFICULT HAVE THESE PROBLEMS MADE IT FOR YOU TO DO YOUR WORK, TAKE CARE OF THINGS AT HOME, OR GET ALONG WITH OTHER PEOPLE: 2
5. POOR APPETITE OR OVEREATING: 0

## 2019-03-21 ASSESSMENT — ANXIETY QUESTIONNAIRES
6. BECOMING EASILY ANNOYED OR IRRITABLE: 3-NEARLY EVERY DAY
7. FEELING AFRAID AS IF SOMETHING AWFUL MIGHT HAPPEN: 0-NOT AT ALL SURE
GAD7 TOTAL SCORE: 17
4. TROUBLE RELAXING: 3-NEARLY EVERY DAY
1. FEELING NERVOUS, ANXIOUS, OR ON EDGE: 3-NEARLY EVERY DAY
2. NOT BEING ABLE TO STOP OR CONTROL WORRYING: 3-NEARLY EVERY DAY
5. BEING SO RESTLESS THAT IT IS HARD TO SIT STILL: 2-OVER HALF THE DAYS
3. WORRYING TOO MUCH ABOUT DIFFERENT THINGS: 3-NEARLY EVERY DAY

## 2019-03-26 ENCOUNTER — TELEPHONE (OUTPATIENT)
Dept: SLEEP CENTER | Age: 39
End: 2019-03-26

## 2019-03-26 ENCOUNTER — PATIENT MESSAGE (OUTPATIENT)
Dept: FAMILY MEDICINE CLINIC | Age: 39
End: 2019-03-26

## 2019-03-27 ENCOUNTER — OFFICE VISIT (OUTPATIENT)
Dept: SURGERY | Age: 39
End: 2019-03-27
Payer: MEDICARE

## 2019-03-27 ENCOUNTER — TELEPHONE (OUTPATIENT)
Dept: FAMILY MEDICINE CLINIC | Age: 39
End: 2019-03-27

## 2019-03-27 VITALS
BODY MASS INDEX: 46.07 KG/M2 | HEIGHT: 61 IN | HEART RATE: 109 BPM | WEIGHT: 244 LBS | DIASTOLIC BLOOD PRESSURE: 86 MMHG | SYSTOLIC BLOOD PRESSURE: 125 MMHG | OXYGEN SATURATION: 99 % | TEMPERATURE: 97.8 F

## 2019-03-27 DIAGNOSIS — N62 MACROMASTIA: Primary | ICD-10-CM

## 2019-03-27 DIAGNOSIS — Z98.890 S/P BILATERAL BREAST REDUCTION: ICD-10-CM

## 2019-03-27 PROCEDURE — 99204 OFFICE O/P NEW MOD 45 MIN: CPT | Performed by: SURGERY

## 2019-03-27 RX ORDER — ONDANSETRON 4 MG/1
4 TABLET, ORALLY DISINTEGRATING ORAL 3 TIMES DAILY PRN
Qty: 21 TABLET | Refills: 0 | Status: SHIPPED | OUTPATIENT
Start: 2019-03-27 | End: 2019-11-04 | Stop reason: SDUPTHER

## 2019-03-28 ENCOUNTER — TELEPHONE (OUTPATIENT)
Dept: FAMILY MEDICINE CLINIC | Age: 39
End: 2019-03-28

## 2019-03-28 ENCOUNTER — OFFICE VISIT (OUTPATIENT)
Dept: ORTHOPEDIC SURGERY | Age: 39
End: 2019-03-28
Payer: MEDICARE

## 2019-03-28 VITALS
DIASTOLIC BLOOD PRESSURE: 81 MMHG | BODY MASS INDEX: 46.08 KG/M2 | SYSTOLIC BLOOD PRESSURE: 136 MMHG | HEIGHT: 61 IN | HEART RATE: 103 BPM | WEIGHT: 244.05 LBS

## 2019-03-28 VITALS
BODY MASS INDEX: 46.08 KG/M2 | HEIGHT: 61 IN | HEART RATE: 90 BPM | DIASTOLIC BLOOD PRESSURE: 86 MMHG | WEIGHT: 244.05 LBS | SYSTOLIC BLOOD PRESSURE: 132 MMHG

## 2019-03-28 DIAGNOSIS — R20.2 PARESTHESIA OF BOTH LOWER EXTREMITIES: ICD-10-CM

## 2019-03-28 DIAGNOSIS — M51.36 DDD (DEGENERATIVE DISC DISEASE), LUMBAR: Primary | ICD-10-CM

## 2019-03-28 DIAGNOSIS — M47.816 SPONDYLOSIS WITHOUT MYELOPATHY OR RADICULOPATHY, LUMBAR REGION: ICD-10-CM

## 2019-03-28 DIAGNOSIS — M72.2 PLANTAR FASCIITIS, LEFT: Primary | ICD-10-CM

## 2019-03-28 PROCEDURE — 99213 OFFICE O/P EST LOW 20 MIN: CPT | Performed by: PODIATRIST

## 2019-03-28 PROCEDURE — 99203 OFFICE O/P NEW LOW 30 MIN: CPT | Performed by: PHYSICAL MEDICINE & REHABILITATION

## 2019-03-28 RX ORDER — GABAPENTIN 300 MG/1
300 CAPSULE ORAL NIGHTLY
Qty: 30 CAPSULE | Refills: 0 | Status: CANCELLED | OUTPATIENT
Start: 2019-03-28 | End: 2019-04-27

## 2019-03-28 RX ORDER — AZITHROMYCIN 250 MG/1
250 TABLET, FILM COATED ORAL
COMMUNITY
Start: 2019-03-26 | End: 2019-03-30

## 2019-03-28 RX ORDER — IPRATROPIUM BROMIDE AND ALBUTEROL SULFATE 2.5; .5 MG/3ML; MG/3ML
3 SOLUTION RESPIRATORY (INHALATION)
Status: ON HOLD | COMMUNITY
Start: 2018-06-22 | End: 2019-05-01

## 2019-03-28 RX ORDER — GABAPENTIN 300 MG/1
300 CAPSULE ORAL NIGHTLY
Qty: 30 CAPSULE | Refills: 1 | Status: SHIPPED | OUTPATIENT
Start: 2019-03-28 | End: 2019-06-13

## 2019-03-28 NOTE — TELEPHONE ENCOUNTER
ATTEMPTED TO CALL PT TO SEE IF THIS IS THE SAME PAIN HAT FREDDY HAS SEEN HER FOR OR SOMETHING DIFFERENT AND TO SEE IF THE PAIN HAS GOTTEN WORSE.  HS

## 2019-03-29 ENCOUNTER — TELEPHONE (OUTPATIENT)
Dept: ORTHOPEDIC SURGERY | Age: 39
End: 2019-03-29

## 2019-04-11 ENCOUNTER — OFFICE VISIT (OUTPATIENT)
Dept: FAMILY MEDICINE CLINIC | Age: 39
End: 2019-04-11
Payer: MEDICARE

## 2019-04-11 ENCOUNTER — HOSPITAL ENCOUNTER (OUTPATIENT)
Dept: CT IMAGING | Age: 39
Discharge: HOME | End: 2019-04-11
Payer: MEDICARE

## 2019-04-11 VITALS
SYSTOLIC BLOOD PRESSURE: 100 MMHG | HEIGHT: 61 IN | RESPIRATION RATE: 18 BRPM | DIASTOLIC BLOOD PRESSURE: 72 MMHG | WEIGHT: 252 LBS | BODY MASS INDEX: 47.58 KG/M2 | HEART RATE: 120 BPM

## 2019-04-11 DIAGNOSIS — G44.53 THUNDERCLAP HEADACHE: Primary | ICD-10-CM

## 2019-04-11 DIAGNOSIS — R07.9 CHEST PAIN, UNSPECIFIED TYPE: ICD-10-CM

## 2019-04-11 DIAGNOSIS — R05.9 COUGH: ICD-10-CM

## 2019-04-11 DIAGNOSIS — G44.53 THUNDERCLAP HEADACHE: ICD-10-CM

## 2019-04-11 DIAGNOSIS — I10 HTN (HYPERTENSION), BENIGN: ICD-10-CM

## 2019-04-11 PROCEDURE — 70470 CT HEAD/BRAIN W/O & W/DYE: CPT

## 2019-04-11 PROCEDURE — 99214 OFFICE O/P EST MOD 30 MIN: CPT | Performed by: REGISTERED NURSE

## 2019-04-11 PROCEDURE — 93000 ELECTROCARDIOGRAM COMPLETE: CPT | Performed by: REGISTERED NURSE

## 2019-04-11 PROCEDURE — 6360000004 HC RX CONTRAST MEDICATION: Performed by: REGISTERED NURSE

## 2019-04-11 RX ORDER — LISINOPRIL AND HYDROCHLOROTHIAZIDE 12.5; 1 MG/1; MG/1
2 TABLET ORAL DAILY
Qty: 60 TABLET | Refills: 5 | Status: SHIPPED | OUTPATIENT
Start: 2019-04-11 | End: 2019-10-02 | Stop reason: SDUPTHER

## 2019-04-11 RX ORDER — PREDNISONE 20 MG/1
TABLET ORAL
COMMUNITY
Start: 2019-04-08 | End: 2019-04-11 | Stop reason: ALTCHOICE

## 2019-04-11 RX ADMIN — IOPAMIDOL 75 ML: 755 INJECTION, SOLUTION INTRAVENOUS at 16:53

## 2019-04-11 ASSESSMENT — ENCOUNTER SYMPTOMS
RHINORRHEA: 0
NAUSEA: 0
SINUS PRESSURE: 0
FACIAL SWELLING: 0
DIARRHEA: 0
WHEEZING: 1
CHEST TIGHTNESS: 0
SHORTNESS OF BREATH: 1
ABDOMINAL PAIN: 0
SINUS PAIN: 0
VOMITING: 0
CONSTIPATION: 0
SORE THROAT: 0
ABDOMINAL DISTENTION: 0
TROUBLE SWALLOWING: 0
COUGH: 1
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
## 2019-04-12 ENCOUNTER — OFFICE VISIT (OUTPATIENT)
Dept: PULMONOLOGY | Age: 39
End: 2019-04-12
Payer: MEDICARE

## 2019-04-12 VITALS
SYSTOLIC BLOOD PRESSURE: 112 MMHG | HEART RATE: 110 BPM | OXYGEN SATURATION: 98 % | RESPIRATION RATE: 22 BRPM | BODY MASS INDEX: 47.65 KG/M2 | WEIGHT: 252 LBS | DIASTOLIC BLOOD PRESSURE: 72 MMHG

## 2019-04-12 DIAGNOSIS — R05.3 CHRONIC COUGH: ICD-10-CM

## 2019-04-12 DIAGNOSIS — R06.09 DOE (DYSPNEA ON EXERTION): ICD-10-CM

## 2019-04-12 DIAGNOSIS — J30.89 NON-SEASONAL ALLERGIC RHINITIS, UNSPECIFIED TRIGGER: Primary | ICD-10-CM

## 2019-04-12 PROCEDURE — 99203 OFFICE O/P NEW LOW 30 MIN: CPT | Performed by: INTERNAL MEDICINE

## 2019-04-12 RX ORDER — OMEPRAZOLE 20 MG/1
20 CAPSULE, DELAYED RELEASE ORAL
Qty: 90 CAPSULE | Refills: 1 | Status: SHIPPED | OUTPATIENT
Start: 2019-04-12 | End: 2019-09-26 | Stop reason: ALTCHOICE

## 2019-04-12 RX ORDER — CETIRIZINE HYDROCHLORIDE 10 MG/1
10 TABLET ORAL DAILY
Qty: 90 TABLET | Refills: 1 | Status: ON HOLD | OUTPATIENT
Start: 2019-04-12 | End: 2019-05-01

## 2019-04-12 ASSESSMENT — ENCOUNTER SYMPTOMS
BLOOD IN STOOL: 0
COUGH: 1
CHEST TIGHTNESS: 0
CHOKING: 0
WHEEZING: 0
STRIDOR: 0
ABDOMINAL DISTENTION: 0
APNEA: 0
VOICE CHANGE: 0
ANAL BLEEDING: 0
ABDOMINAL PAIN: 0
SORE THROAT: 0
CONSTIPATION: 0
RHINORRHEA: 0
SHORTNESS OF BREATH: 1
DIARRHEA: 0
SINUS PRESSURE: 0
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
## 2019-04-17 ENCOUNTER — TELEPHONE (OUTPATIENT)
Dept: FAMILY MEDICINE CLINIC | Age: 39
End: 2019-04-17

## 2019-04-17 NOTE — TELEPHONE ENCOUNTER
CALLED PT AND ASKED HER TO GIVE THE OFFICE A CALL. JUST WANTED TO LET HER KNOW THAT WE WILL DISCUSS THE HYPERTENSION AND GIVE HER CLEARANCE FOR HER DENTAL PROCEDURE ON Monday 04/22/2019 AT HER PRE-OP FOR HER CARPEL TUNNEL SURGERY.  HS

## 2019-04-22 ENCOUNTER — OFFICE VISIT (OUTPATIENT)
Dept: ORTHOPEDIC SURGERY | Age: 39
End: 2019-04-22
Payer: MEDICARE

## 2019-04-22 ENCOUNTER — OFFICE VISIT (OUTPATIENT)
Dept: FAMILY MEDICINE CLINIC | Age: 39
End: 2019-04-22
Payer: MEDICARE

## 2019-04-22 VITALS
BODY MASS INDEX: 47.69 KG/M2 | TEMPERATURE: 98.1 F | HEART RATE: 100 BPM | WEIGHT: 252.6 LBS | OXYGEN SATURATION: 97 % | SYSTOLIC BLOOD PRESSURE: 104 MMHG | DIASTOLIC BLOOD PRESSURE: 62 MMHG | RESPIRATION RATE: 20 BRPM | HEIGHT: 61 IN

## 2019-04-22 VITALS — BODY MASS INDEX: 47.45 KG/M2 | HEIGHT: 61 IN | WEIGHT: 251.32 LBS

## 2019-04-22 DIAGNOSIS — M51.36 DDD (DEGENERATIVE DISC DISEASE), LUMBAR: ICD-10-CM

## 2019-04-22 DIAGNOSIS — M54.16 LUMBAR RADICULOPATHY: ICD-10-CM

## 2019-04-22 DIAGNOSIS — I10 ESSENTIAL HYPERTENSION: ICD-10-CM

## 2019-04-22 DIAGNOSIS — M51.26 HNP (HERNIATED NUCLEUS PULPOSUS), LUMBAR: Primary | ICD-10-CM

## 2019-04-22 DIAGNOSIS — G56.01 CARPAL TUNNEL SYNDROME OF RIGHT WRIST: Primary | ICD-10-CM

## 2019-04-22 DIAGNOSIS — R73.03 PRE-DIABETES: ICD-10-CM

## 2019-04-22 DIAGNOSIS — Z01.818 PRE-OP EXAMINATION: ICD-10-CM

## 2019-04-22 LAB
ANION GAP SERPL CALCULATED.3IONS-SCNC: 15 MMOL/L (ref 3–16)
BUN BLDV-MCNC: 14 MG/DL (ref 7–20)
CALCIUM SERPL-MCNC: 9.8 MG/DL (ref 8.3–10.6)
CHLORIDE BLD-SCNC: 94 MMOL/L (ref 99–110)
CO2: 28 MMOL/L (ref 21–32)
CREAT SERPL-MCNC: 0.8 MG/DL (ref 0.6–1.1)
GFR AFRICAN AMERICAN: >60
GFR NON-AFRICAN AMERICAN: >60
GLUCOSE BLD-MCNC: 159 MG/DL (ref 70–99)
POTASSIUM SERPL-SCNC: 4.2 MMOL/L (ref 3.5–5.1)
SODIUM BLD-SCNC: 137 MMOL/L (ref 136–145)

## 2019-04-22 PROCEDURE — 36415 COLL VENOUS BLD VENIPUNCTURE: CPT | Performed by: NURSE PRACTITIONER

## 2019-04-22 PROCEDURE — 99213 OFFICE O/P EST LOW 20 MIN: CPT | Performed by: PHYSICAL MEDICINE & REHABILITATION

## 2019-04-22 PROCEDURE — 99212 OFFICE O/P EST SF 10 MIN: CPT | Performed by: NURSE PRACTITIONER
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
## 2019-04-22 NOTE — LETTER
[address] [facility] [address]  Phone: [phone]  Fax: [phone]    TIARA Chapman CNP        April 22, 2019     Patient: Harjit Haas   YOB: 1980   Date of Visit: 4/22/2019       To Whom it May Concern:    aDve Brambila was seen in my clinic on 4/22/2019. She is cleared for dental procedure at this time as her blood pressure was within normal limits during her visit today. If you have any questions or concerns, please don't hesitate to call.     Sincerely,         TIARA Chapman CNP
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
## 2019-04-22 NOTE — PATIENT INSTRUCTIONS
Patient Education        Carpal Tunnel Release: Before Your Surgery  What is carpal tunnel release? Carpal tunnel release is surgery that reduces the pressure on a nerve in the wrist. Your doctor will cut a ligament that presses on the nerve. This lets the nerve pass freely through the tunnel without being squeezed. The surgery can be open or endoscopic. In open surgery, your doctor makes a small cut in the palm of your hand. This cut is called an incision. In endoscopic surgery, your doctor makes one small incision in the wrist, or one small incision in the wrist and one in the palm. Your doctor puts a thin tube with a camera attached (endoscope) into the incision. Surgical tools are put in along with the endoscope. In both types of surgeries, the incisions are closed with stitches. The incisions leave scars that usually fade in time. You may be asleep during the surgery. Or you may be awake and have medicine to numb your hand and arm so you will not feel pain. After surgery, your wrist and hand pain should begin to go away. It usually takes 3 to 4 months to recover and 1 year before your hand strength returns. How much hand strength returns is different for each person. You will go home the same day as the surgery. When you can return to work depends on the type of work you do. Follow-up care is a key part of your treatment and safety. Be sure to make and go to all appointments, and call your doctor if you are having problems. It's also a good idea to know your test results and keep a list of the medicines you take. What happens before surgery?   Surgery can be stressful. This information will help you understand what you can expect. And it will help you safely prepare for surgery.   Preparing for surgery    · Understand exactly what surgery is planned, along with the risks, benefits, and other options. · Tell your doctors ALL the medicines, vitamins, supplements, and herbal remedies you take.  Some be given more specific instructions about recovering from your surgery. They will cover things like diet, wound care, follow-up care, driving, and getting back to your normal routine. When should you call your doctor? · You have questions or concerns.     · You don't understand how to prepare for your surgery.     · You become ill before the surgery (such as fever, flu, or a cold).     · You need to reschedule or have changed your mind about having the surgery. Where can you learn more? Go to [email or web address] [email or web address] org and sign in to your Dragonfly account. Enter F829 in the [place] box to learn more about \"Carpal Tunnel Release: Before Your Surgery. \"     If you do not have an account, please click on the \"Sign Up Now\" link. Current as of: September 20, 2018  Content Version: 11.9  © 0177-8209 [employer]. Care instructions adapted under license by [facility] ([facility]). If you have questions about a medical condition or this instruction, always ask your healthcare professional. [address] any warranty or liability for your use of this information. Patient Education        Prediabetes: Care Instructions  Your Care Instructions    Prediabetes is a warning sign that you are at risk for getting type 2 diabetes. It means that your blood sugar is higher than it should be. The food you eat turns into sugar, which your body uses for energy. Normally, an organ called the pancreas makes insulin, which allows the sugar in your blood to get into your body's cells. But when your body can't use insulin the right way, the sugar doesn't move into cells. It stays in your blood instead. This is called insulin resistance. The buildup of sugar in the blood causes prediabetes. The good news is that lifestyle changes may help you get your blood sugar back to normal and help you avoid or delay diabetes. Follow-up care is a key part of your treatment and safety.  Be

## 2019-04-23 ENCOUNTER — TELEPHONE (OUTPATIENT)
Dept: BARIATRICS/WEIGHT MGMT | Age: 39
End: 2019-04-23

## 2019-04-23 ENCOUNTER — TELEPHONE (OUTPATIENT)
Dept: ORTHOPEDIC SURGERY | Age: 39
End: 2019-04-23

## 2019-04-23 LAB
ESTIMATED AVERAGE GLUCOSE: 148.5 MG/DL
HBA1C MFR BLD: 6.8 %

## 2019-04-23 NOTE — TELEPHONE ENCOUNTER
LM for patient to call back. Patient attended a seminar 4/17/19 with Dr Janene Masters. She DOES have BWLS coverage with [employer] (6mo consecutive diet)   Please schedule with Dr. Janene Masters. BMI Form scanned in media.  Thanks

## 2019-04-23 NOTE — PROGRESS NOTES
PATIENT REACHED   YES____NO__x__    PREOP INSTUCTIONS LEFT ON VM [id number]___[phone]____________      DATE__5/1/19_______ TIME__0940_______ARRIVAL__0840______PLACE__MASC__________  NOTHING TO EAT OR DRINK  6 HOURS PRIOR TO PROCEDURE START TIME  YOU NEED A RESPONSIBLE ADULT AGE 18 OR OLDER TO DRIVE YOU HOME  PLEASE BRING INSURANCE CARD. PICTURE ID AND COMPLETE LIST OF MEDS  WEAR LOOSE COMFORTABLE CLOTHING  FOLLOW ANY INSTRUCTIONS YOUR DRS OFFICE HAS GIVEN YOU,INCLUDING WHAT MEDICATIONS TO TAKE THE AM OF PROCEDURE AND WHEN AND IF YOU NEED TO STOP ANY BLOOD THINNERS. IF YOU HAVE QUESTIONS REGARDING THIS CALL THE OFFICE  THE GOAL BLOOD SUGAR THE AM OF PROCEDURE  OR LESS ABOVE THAT THE PROCEDURE MAY BE CANCELLED  ANY QUESTIONS CALL YOUR DOCTOR. ALSO,PLEASE READ THE INSTRUCTION PACKET FROM YOUR DR IF YOU RECEIVED ONE.   SPINE INTERVENTION NUMBER [phone]

## 2019-04-23 NOTE — TELEPHONE ENCOUNTER
DOS   05/01/2019   CPT   51375.50   53755.26   51473  DX   M51.26   M54.16    M51.36  OP SX AUTH  NPR   BILATERAL  LEVELS   L5   PROCEDURE   TRANSFORAMINAL EPDIURAL INJECTION  DR. Homer Gonzalez  INSURANCE:   Dora Shone

## 2019-04-29 ENCOUNTER — TELEPHONE (OUTPATIENT)
Dept: BARIATRICS/WEIGHT MGMT | Age: 39
End: 2019-04-29

## 2019-05-01 ENCOUNTER — HOSPITAL ENCOUNTER (OUTPATIENT)
Age: 39
Setting detail: OUTPATIENT SURGERY
Discharge: HOME | End: 2019-05-01
Attending: PHYSICAL MEDICINE & REHABILITATION | Admitting: PHYSICAL MEDICINE & REHABILITATION
Payer: MEDICARE

## 2019-05-01 ENCOUNTER — APPOINTMENT (OUTPATIENT)
Dept: GENERAL RADIOLOGY | Age: 39
End: 2019-05-01
Attending: PHYSICAL MEDICINE & REHABILITATION
Payer: MEDICARE

## 2019-05-01 VITALS
DIASTOLIC BLOOD PRESSURE: 71 MMHG | BODY MASS INDEX: 47.2 KG/M2 | TEMPERATURE: 97.5 F | HEART RATE: 88 BPM | RESPIRATION RATE: 18 BRPM | SYSTOLIC BLOOD PRESSURE: 107 MMHG | WEIGHT: 250 LBS | HEIGHT: 61 IN | OXYGEN SATURATION: 100 %

## 2019-05-01 LAB
GLUCOSE BLD-MCNC: 130 MG/DL (ref 70–99)
HCG(URINE) PREGNANCY TEST: NEGATIVE
PERFORMED ON: ABNORMAL

## 2019-05-01 PROCEDURE — 2500000003 HC RX 250 WO HCPCS: Performed by: PHYSICAL MEDICINE & REHABILITATION

## 2019-05-01 PROCEDURE — 84703 CHORIONIC GONADOTROPIN ASSAY: CPT

## 2019-05-01 PROCEDURE — 2709999900 HC NON-CHARGEABLE SUPPLY: Performed by: PHYSICAL MEDICINE & REHABILITATION

## 2019-05-01 PROCEDURE — 6360000002 HC RX W HCPCS: Performed by: PHYSICAL MEDICINE & REHABILITATION

## 2019-05-01 PROCEDURE — 3610000056 HC PAIN LEVEL 4 BASE (NON-OR): Performed by: PHYSICAL MEDICINE & REHABILITATION

## 2019-05-01 PROCEDURE — 99152 MOD SED SAME PHYS/QHP 5/>YRS: CPT | Performed by: PHYSICAL MEDICINE & REHABILITATION

## 2019-05-01 PROCEDURE — 3209999900 FLUORO FOR SURGICAL PROCEDURES

## 2019-05-01 RX ORDER — BUPIVACAINE HYDROCHLORIDE 5 MG/ML
INJECTION, SOLUTION PERINEURAL
Status: COMPLETED | OUTPATIENT
Start: 2019-05-01 | End: 2019-05-01

## 2019-05-01 RX ORDER — LIDOCAINE HYDROCHLORIDE 10 MG/ML
INJECTION, SOLUTION INFILTRATION; PERINEURAL
Status: COMPLETED | OUTPATIENT
Start: 2019-05-01 | End: 2019-05-01

## 2019-05-01 RX ORDER — MIDAZOLAM HYDROCHLORIDE 1 MG/ML
INJECTION INTRAMUSCULAR; INTRAVENOUS
Status: COMPLETED | OUTPATIENT
Start: 2019-05-01 | End: 2019-05-01

## 2019-05-01 RX ORDER — METHYLPREDNISOLONE ACETATE 80 MG/ML
INJECTION, SUSPENSION INTRA-ARTICULAR; INTRALESIONAL; INTRAMUSCULAR; SOFT TISSUE
Status: COMPLETED | OUTPATIENT
Start: 2019-05-01 | End: 2019-05-01

## 2019-05-01 RX ORDER — METFORMIN HYDROCHLORIDE 500 MG/1
500 TABLET, EXTENDED RELEASE ORAL
COMMUNITY
End: 2019-07-22 | Stop reason: SINTOL

## 2019-05-01 ASSESSMENT — PAIN SCALES - GENERAL
PAINLEVEL_OUTOF10: 4
PAINLEVEL_OUTOF10: 6

## 2019-05-01 ASSESSMENT — PAIN - FUNCTIONAL ASSESSMENT: PAIN_FUNCTIONAL_ASSESSMENT: 0-10

## 2019-05-01 NOTE — PROGRESS NOTES
IV discontinued, catheter intact, and dressing applied. Procedural dressing dry and intact. Bilateral lower extremities equal in strength. Discharge instructions reviewed with patient or responsible adult, signed and copy given. All home medications have been reviewed. All questions answered and patient or responsible adult verbalized understanding.   PAIN LEVEL AT DISCHARGE __6___

## 2019-05-01 NOTE — H&P
ZOLMitriptan (ZOMIG) 2.5 MG SOLN use 1 spray in nostril one time x1 dose. no more than 2 actuations daily 3/7/19  Yes Willey Buerger, APRN - CNP   magnesium citrate solution Take 296 mLs by mouth once for 1 dose 5/3/19 5/3/19  TIARA Pina CNP   omeprazole (PRILOSEC) 20 MG delayed release capsule Take 1 capsule by mouth every morning (before breakfast) 4/12/19   Amadou Rai MD   ondansetron (ZOFRAN-ODT) 4 MG disintegrating tablet Take 1 tablet by mouth 3 times daily as needed for Nausea or Vomiting 3/27/19   TIARA Pizano CNP   L-Methylfolate-Algae (DEPLIN 15) 15-90.314 MG CAPS Take 15 mg by mouth daily 3/21/19   TIARA De Anda CNP   albuterol sulfate HFA (PROVENTIL HFA) 108 (90 Base) MCG/ACT inhaler Inhale 2 puffs into the lungs every 4 hours as needed for Wheezing or Shortness of Breath (Space out to every 6 hours as symptoms improve) Space out to every 6 hours as symptoms improve. 3/19/19   TIARA Carbone CNP   Spacer/Aero-Holding Vernard Gills 1 Device by Does not apply route daily as needed (ASTHMA/COUGH/SOB) 1/8/19   TIARA Mclean CNP     Allergies:  Sertraline  Social History:    reports that she has never smoked. She has never used smokeless tobacco. She reports that she does not drink alcohol or use drugs. Family History:   Family History   Problem Relation Age of Onset    Diabetes Mother     High Blood Pressure Mother     Alcohol Abuse Father     Seizures Sister     Depression Sister     Diabetes Brother     No Known Problems Maternal Grandmother     No Known Problems Maternal Grandfather     No Known Problems Paternal Grandmother     No Known Problems Paternal Grandfather     Mental Illness Sister     No Known Problems Brother     Substance Abuse Brother        Vitals: Blood pressure 107/71, pulse 88, temperature 97.5 °F (36.4 °C), temperature source Temporal, resp.  rate 18, height 5' 1\" (1.549 m), weight

## 2019-05-02 ENCOUNTER — PATIENT MESSAGE (OUTPATIENT)
Dept: FAMILY MEDICINE CLINIC | Age: 39
End: 2019-05-02

## 2019-05-02 NOTE — TELEPHONE ENCOUNTER
From: Karo Armendariz  To: TIARA Andersen - CNP  Sent: 5/2/2019 5:03 AM EDT  Subject: Non-Urgent Medical Question    Will my insurance cover the new dexcom cg6 if it does could I get that my insurance is [employer] and [facility]

## 2019-05-03 ENCOUNTER — TELEPHONE (OUTPATIENT)
Dept: FAMILY MEDICINE CLINIC | Age: 39
End: 2019-05-03

## 2019-05-03 DIAGNOSIS — K59.00 CONSTIPATION, UNSPECIFIED CONSTIPATION TYPE: Primary | ICD-10-CM

## 2019-05-03 NOTE — TELEPHONE ENCOUNTER
Patient has been having trouble with Constipation x 2-3 weeks now.  Has trie the Mirilax with no success  Can something be called in to pharmacy for her  Please sent to [facility] on [phone]

## 2019-05-08 ENCOUNTER — HOSPITAL ENCOUNTER (OUTPATIENT)
Dept: NON INVASIVE DIAGNOSTICS | Age: 39
Discharge: HOME | End: 2019-05-08
Payer: MEDICARE

## 2019-05-08 ENCOUNTER — TELEPHONE (OUTPATIENT)
Dept: FAMILY MEDICINE CLINIC | Age: 39
End: 2019-05-08

## 2019-05-08 DIAGNOSIS — R06.09 DOE (DYSPNEA ON EXERTION): ICD-10-CM

## 2019-05-08 LAB
LV EF: 65 %
LVEF MODALITY: NORMAL

## 2019-05-08 PROCEDURE — 93306 TTE W/DOPPLER COMPLETE: CPT

## 2019-05-08 PROCEDURE — 6360000004 HC RX CONTRAST MEDICATION: Performed by: INTERNAL MEDICINE

## 2019-05-08 PROCEDURE — C8929 TTE W OR WO FOL WCON,DOPPLER: HCPCS

## 2019-05-08 RX ADMIN — PERFLUTREN 1.43 MG: 6.52 INJECTION, SUSPENSION INTRAVENOUS at 08:13

## 2019-05-13 ENCOUNTER — TELEPHONE (OUTPATIENT)
Dept: BARIATRICS/WEIGHT MGMT | Age: 39
End: 2019-05-13

## 2019-05-13 NOTE — TELEPHONE ENCOUNTER
darrin from 6/4 to 6/7 --l/m to have her sched in the 8:30 - 8am arrival New pt slot - he moved from afternoon to morning --verify paperwork / and appt location please .  KINSEY 5/13/19

## 2019-05-14 ENCOUNTER — TELEPHONE (OUTPATIENT)
Dept: PULMONOLOGY | Age: 39
End: 2019-05-14

## 2019-05-14 NOTE — TELEPHONE ENCOUNTER
Received a letter from [facility] regarding missed cpap set up. The patient was called and said that she said that she just went to [facility] and did her HST and is waiting on a cpap.    I told her that we had spoke in the past that everything was done and she just needed to come in to  her machine from [facility]

## 2019-05-17 DIAGNOSIS — F42.2 MIXED OBSESSIONAL THOUGHTS AND ACTS: ICD-10-CM

## 2019-05-17 DIAGNOSIS — F41.9 ANXIETY: ICD-10-CM

## 2019-05-17 DIAGNOSIS — F33.1 MODERATE EPISODE OF RECURRENT MAJOR DEPRESSIVE DISORDER (HCC): ICD-10-CM

## 2019-05-17 RX ORDER — ESCITALOPRAM OXALATE 10 MG/1
10 TABLET ORAL DAILY
Qty: 30 TABLET | Refills: 3 | Status: SHIPPED | OUTPATIENT
Start: 2019-05-17 | End: 2019-06-13

## 2019-05-21 DIAGNOSIS — F41.9 ANXIETY: ICD-10-CM

## 2019-05-21 RX ORDER — LEVOMEFOLATE/ALGAL OIL 15-90.314
15 CAPSULE ORAL DAILY
Qty: 30 CAPSULE | Refills: 3 | Status: SHIPPED | OUTPATIENT
Start: 2019-05-21 | End: 2019-06-13

## 2019-05-21 RX ORDER — LORAZEPAM 0.5 MG/1
0.5 TABLET ORAL DAILY PRN
Qty: 30 TABLET | Refills: 1 | Status: SHIPPED | OUTPATIENT
Start: 2019-05-21 | End: 2019-06-13 | Stop reason: SDUPTHER

## 2019-05-31 ENCOUNTER — TELEPHONE (OUTPATIENT)
Dept: FAMILY MEDICINE CLINIC | Age: 39
End: 2019-05-31

## 2019-05-31 NOTE — TELEPHONE ENCOUNTER
Unfortunately we are no longer accepting new patient in our [place] location. However, we are accepting new patient in our [address] [facility] location with either Dr. Baron Higgins or Gabi Jennings our NP. Patient can call the call center at [phone] if [address] [place] [address] location will not work for them. Left message for patient to call back.

## 2019-06-03 ENCOUNTER — TELEPHONE (OUTPATIENT)
Dept: INTERNAL MEDICINE CLINIC | Age: 39
End: 2019-06-03

## 2019-06-03 ENCOUNTER — HOSPITAL ENCOUNTER (EMERGENCY)
Age: 39
Discharge: HOME | End: 2019-06-04
Payer: MEDICARE

## 2019-06-03 DIAGNOSIS — F41.1 ANXIETY STATE: Primary | ICD-10-CM

## 2019-06-03 DIAGNOSIS — F43.0 STRESS REACTION: ICD-10-CM

## 2019-06-03 PROCEDURE — 6370000000 HC RX 637 (ALT 250 FOR IP): Performed by: PHYSICIAN ASSISTANT

## 2019-06-03 PROCEDURE — 99282 EMERGENCY DEPT VISIT SF MDM: CPT

## 2019-06-03 RX ORDER — LORAZEPAM 1 MG/1
1 TABLET ORAL ONCE
Status: COMPLETED | OUTPATIENT
Start: 2019-06-03 | End: 2019-06-03

## 2019-06-03 RX ADMIN — LORAZEPAM 1 MG: 1 TABLET ORAL at 22:42

## 2019-06-03 ASSESSMENT — ENCOUNTER SYMPTOMS
COLOR CHANGE: 0
COUGH: 0
DIARRHEA: 0
RESPIRATORY NEGATIVE: 1
VOMITING: 0
CONSTIPATION: 0
SHORTNESS OF BREATH: 0
ABDOMINAL PAIN: 0
NAUSEA: 0

## 2019-06-03 NOTE — TELEPHONE ENCOUNTER
PA SUBMITTED FOR Lisinopril-hydroCHLOROthiazide 10-12.5MG tablets  Key: [id number] - PA Case ID: [id number] - Rx #: [id number]  PENDING

## 2019-06-04 VITALS
DIASTOLIC BLOOD PRESSURE: 62 MMHG | BODY MASS INDEX: 47.2 KG/M2 | TEMPERATURE: 98.5 F | HEIGHT: 61 IN | HEART RATE: 117 BPM | RESPIRATION RATE: 18 BRPM | OXYGEN SATURATION: 94 % | WEIGHT: 250 LBS | SYSTOLIC BLOOD PRESSURE: 136 MMHG

## 2019-06-04 NOTE — ED NOTES
Discharge instructions given, patient acknowledged understanding, patient ambulated out of ed upon discharge to waiting room to wait on [phone] Janet Gibbons RN  06/04/19 0021
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
## 2019-06-04 NOTE — ED NOTES
Bed: 19  Expected date:   Expected time:   Means of arrival:   Comments:  mary Mensah RN  06/03/19 3782

## 2019-06-04 NOTE — TELEPHONE ENCOUNTER
Received APPROVAL for Lisinopril-hydroCHLOROthiazide 10-12.5MG tablets through 12/31/2019. Approval letter attached. Please advise patient. Thank you.

## 2019-06-05 RX ORDER — ALBUTEROL SULFATE 90 UG/1
2 AEROSOL, METERED RESPIRATORY (INHALATION) EVERY 4 HOURS PRN
Qty: 1 INHALER | Refills: 0 | Status: SHIPPED | OUTPATIENT
Start: 2019-06-05 | End: 2019-06-10 | Stop reason: SDUPTHER

## 2019-06-06 ENCOUNTER — TELEPHONE (OUTPATIENT)
Dept: FAMILY MEDICINE CLINIC | Age: 39
End: 2019-06-06

## 2019-06-06 DIAGNOSIS — M54.5 ACUTE LOW BACK PAIN, UNSPECIFIED BACK PAIN LATERALITY, WITH SCIATICA PRESENCE UNSPECIFIED: Primary | ICD-10-CM

## 2019-06-06 NOTE — TELEPHONE ENCOUNTER
Is she looking for insurance to cover this or for cosmetics? I have given her a referral to Dr. ROMERO[facility]. she will need to call her insurance to discuss options if she is wanting them to cover this.

## 2019-06-06 NOTE — TELEPHONE ENCOUNTER
Patient wants a recommendation on who could do a breast reduction. Her back is hurting her. Please give her a call back.

## 2019-06-10 RX ORDER — ALBUTEROL SULFATE 90 UG/1
AEROSOL, METERED RESPIRATORY (INHALATION)
Qty: 1 INHALER | Refills: 0 | Status: SHIPPED | OUTPATIENT
Start: 2019-06-10 | End: 2019-07-08 | Stop reason: SDUPTHER

## 2019-06-13 ENCOUNTER — OFFICE VISIT (OUTPATIENT)
Dept: PSYCHIATRY | Age: 39
End: 2019-06-13
Payer: MEDICARE

## 2019-06-13 ENCOUNTER — OFFICE VISIT (OUTPATIENT)
Dept: FAMILY MEDICINE CLINIC | Age: 39
End: 2019-06-13
Payer: MEDICARE

## 2019-06-13 ENCOUNTER — TELEPHONE (OUTPATIENT)
Dept: FAMILY MEDICINE CLINIC | Age: 39
End: 2019-06-13

## 2019-06-13 VITALS
WEIGHT: 253.2 LBS | OXYGEN SATURATION: 99 % | DIASTOLIC BLOOD PRESSURE: 70 MMHG | BODY MASS INDEX: 47.8 KG/M2 | HEIGHT: 61 IN | SYSTOLIC BLOOD PRESSURE: 110 MMHG | HEART RATE: 80 BPM

## 2019-06-13 VITALS
HEART RATE: 62 BPM | WEIGHT: 253.2 LBS | BODY MASS INDEX: 47.8 KG/M2 | SYSTOLIC BLOOD PRESSURE: 110 MMHG | DIASTOLIC BLOOD PRESSURE: 68 MMHG | HEIGHT: 61 IN

## 2019-06-13 DIAGNOSIS — K62.89 RECTAL PAIN: ICD-10-CM

## 2019-06-13 DIAGNOSIS — F41.9 ANXIETY: ICD-10-CM

## 2019-06-13 DIAGNOSIS — F33.1 MODERATE EPISODE OF RECURRENT MAJOR DEPRESSIVE DISORDER (HCC): ICD-10-CM

## 2019-06-13 DIAGNOSIS — F42.2 MIXED OBSESSIONAL THOUGHTS AND ACTS: ICD-10-CM

## 2019-06-13 DIAGNOSIS — R10.32 LLQ ABDOMINAL PAIN: ICD-10-CM

## 2019-06-13 DIAGNOSIS — K59.00 CONSTIPATION, UNSPECIFIED CONSTIPATION TYPE: Primary | ICD-10-CM

## 2019-06-13 LAB
AMPHETAMINE SCREEN, URINE: NORMAL
BARBITURATE SCREEN, URINE: NORMAL
BENZODIAZEPINE SCREEN, URINE: NORMAL
BUPRENORPHINE URINE: NORMAL
COCAINE METABOLITE SCREEN URINE: NORMAL
GABAPENTIN SCREEN, URINE: NORMAL
MDMA URINE: NORMAL
METHADONE SCREEN, URINE: NORMAL
METHAMPHETAMINE, URINE: NORMAL
OPIATE SCREEN URINE: NORMAL
OXYCODONE SCREEN URINE: NORMAL
PHENCYCLIDINE SCREEN URINE: NORMAL
PROPOXYPHENE SCREEN, URINE: NORMAL
THC SCREEN, URINE: NORMAL
TRICYCLIC ANTIDEPRESSANTS, UR: NORMAL

## 2019-06-13 PROCEDURE — 80305 DRUG TEST PRSMV DIR OPT OBS: CPT | Performed by: NURSE PRACTITIONER

## 2019-06-13 PROCEDURE — 90832 PSYTX W PT 30 MINUTES: CPT | Performed by: NURSE PRACTITIONER

## 2019-06-13 PROCEDURE — G8427 DOCREV CUR MEDS BY ELIG CLIN: HCPCS | Performed by: REGISTERED NURSE

## 2019-06-13 PROCEDURE — G8417 CALC BMI ABV UP PARAM F/U: HCPCS | Performed by: REGISTERED NURSE

## 2019-06-13 PROCEDURE — 99214 OFFICE O/P EST MOD 30 MIN: CPT | Performed by: REGISTERED NURSE

## 2019-06-13 PROCEDURE — 1036F TOBACCO NON-USER: CPT | Performed by: REGISTERED NURSE

## 2019-06-13 RX ORDER — LORAZEPAM 0.5 MG/1
0.5 TABLET ORAL DAILY PRN
Qty: 30 TABLET | Refills: 1 | Status: SHIPPED | OUTPATIENT
Start: 2019-06-22 | End: 2019-07-22

## 2019-06-13 RX ORDER — TRAZODONE HYDROCHLORIDE 50 MG/1
TABLET ORAL
Qty: 60 TABLET | Refills: 3 | Status: SHIPPED | OUTPATIENT
Start: 2019-06-13 | End: 2019-10-17

## 2019-06-13 RX ORDER — AMOXICILLIN 250 MG
2 CAPSULE ORAL DAILY PRN
Qty: 60 TABLET | Refills: 1 | Status: SHIPPED | OUTPATIENT
Start: 2019-06-13 | End: 2019-07-22

## 2019-06-13 RX ORDER — ESCITALOPRAM OXALATE 20 MG/1
20 TABLET ORAL DAILY
Qty: 30 TABLET | Refills: 3 | Status: SHIPPED | OUTPATIENT
Start: 2019-06-13 | End: 2019-09-13

## 2019-06-13 ASSESSMENT — ENCOUNTER SYMPTOMS
ANAL BLEEDING: 1
COUGH: 1
ABDOMINAL DISTENTION: 0
SHORTNESS OF BREATH: 0
BLOOD IN STOOL: 0
WHEEZING: 0
RECTAL PAIN: 1
VOMITING: 1
CHEST TIGHTNESS: 0
CONSTIPATION: 1
NAUSEA: 1
ABDOMINAL PAIN: 1
DIARRHEA: 0

## 2019-06-13 ASSESSMENT — ANXIETY QUESTIONNAIRES
GAD7 TOTAL SCORE: 16
3. WORRYING TOO MUCH ABOUT DIFFERENT THINGS: 2-OVER HALF THE DAYS
5. BEING SO RESTLESS THAT IT IS HARD TO SIT STILL: 2-OVER HALF THE DAYS
4. TROUBLE RELAXING: 3-NEARLY EVERY DAY
6. BECOMING EASILY ANNOYED OR IRRITABLE: 2-OVER HALF THE DAYS
2. NOT BEING ABLE TO STOP OR CONTROL WORRYING: 3-NEARLY EVERY DAY
7. FEELING AFRAID AS IF SOMETHING AWFUL MIGHT HAPPEN: 1-SEVERAL DAYS
1. FEELING NERVOUS, ANXIOUS, OR ON EDGE: 3-NEARLY EVERY DAY

## 2019-06-13 ASSESSMENT — PATIENT HEALTH QUESTIONNAIRE - PHQ9
SUM OF ALL RESPONSES TO PHQ QUESTIONS 1-9: 11
SUM OF ALL RESPONSES TO PHQ9 QUESTIONS 1 & 2: 4
8. MOVING OR SPEAKING SO SLOWLY THAT OTHER PEOPLE COULD HAVE NOTICED. OR THE OPPOSITE, BEING SO FIGETY OR RESTLESS THAT YOU HAVE BEEN MOVING AROUND A LOT MORE THAN USUAL: 0
7. TROUBLE CONCENTRATING ON THINGS, SUCH AS READING THE NEWSPAPER OR WATCHING TELEVISION: 3
9. THOUGHTS THAT YOU WOULD BE BETTER OFF DEAD, OR OF HURTING YOURSELF: 1
6. FEELING BAD ABOUT YOURSELF - OR THAT YOU ARE A FAILURE OR HAVE LET YOURSELF OR YOUR FAMILY DOWN: 0
2. FEELING DOWN, DEPRESSED OR HOPELESS: 2
1. LITTLE INTEREST OR PLEASURE IN DOING THINGS: 2
5. POOR APPETITE OR OVEREATING: 0
10. IF YOU CHECKED OFF ANY PROBLEMS, HOW DIFFICULT HAVE THESE PROBLEMS MADE IT FOR YOU TO DO YOUR WORK, TAKE CARE OF THINGS AT HOME, OR GET ALONG WITH OTHER PEOPLE: 0
3. TROUBLE FALLING OR STAYING ASLEEP: 3
4. FEELING TIRED OR HAVING LITTLE ENERGY: 0
SUM OF ALL RESPONSES TO PHQ QUESTIONS 1-9: 11
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
## 2019-06-13 NOTE — TELEPHONE ENCOUNTER
Pt seen today - she was prescribed  Magnesium citrate solution - her insurance doesn't cover this - please call in something else    [address] [phone] - F [phone]      Pt @  [phone]

## 2019-06-13 NOTE — PATIENT INSTRUCTIONS
Use a step-wise approach starting at the top of the list, and moving through if symptoms not improving. 1. Fiber - rich diet and increase fluids. Recommend 20-30 g daily of fiber. Can achieve by using wheat bran 2-6 T with meals and 8 ounces of water. Recommend a slow taper up to reduce bloating. 2. Metamucil, citrucel, fibercon, or benefiber supplements. 3. Colace stool softener. 4. Miralax (or Golytely) 17 g powder in 8 oz of water daily. Titrate to effect. Max 34 g daily. 5. Senokot/Dulcolax  6. Glycerine suppositories    Patient Education        Constipation: Care Instructions  Your Care Instructions    Constipation means that you have a hard time passing stools (bowel movements). People pass stools from 3 times a day to once every 3 days. What is normal for you may be different. Constipation may occur with pain in the rectum and cramping. The pain may get worse when you try to pass stools. Sometimes there are small amounts of bright red blood on toilet paper or the surface of stools. This is because of enlarged veins near the rectum (hemorrhoids). A few changes in your diet and lifestyle may help you avoid ongoing constipation. Your doctor may also prescribe medicine to help loosen your stool. Some medicines can cause constipation. These include pain medicines and antidepressants. Tell your doctor about all the medicines you take. Your doctor may want to make a medicine change to ease your symptoms. Follow-up care is a key part of your treatment and safety. Be sure to make and go to all appointments, and call your doctor if you are having problems. It's also a good idea to know your test results and keep a list of the medicines you take. How can you care for yourself at home? · Drink plenty of fluids, enough so that your urine is light yellow or clear like water.  If you have kidney, heart, or liver disease and have to limit fluids, talk with your doctor before you increase the amount of fluids you drink. · Include high-fiber foods in your diet each day. These include fruits, vegetables, beans, and whole grains. · Get at least 30 minutes of exercise on most days of the week. Walking is a good choice. You also may want to do other activities, such as running, swimming, cycling, or playing tennis or team sports. · Take a fiber supplement, such as Citrucel or Metamucil, every day. Read and follow all instructions on the label. · Schedule time each day for a bowel movement. A daily routine may help. Take your time having your bowel movement. · Support your feet with a small step stool when you sit on the toilet. This helps flex your hips and places your pelvis in a squatting position. · Your doctor may recommend an over-the-counter laxative to relieve your constipation. Examples are Milk of Magnesia and MiraLax. Read and follow all instructions on the label. Do not use laxatives on a long-term basis. When should you call for help? Call your doctor now or seek immediate medical care if:    · You have new or worse belly pain.     · You have new or worse nausea or vomiting.     · You have blood in your stools.    Watch closely for changes in your health, and be sure to contact your doctor if:    · Your constipation is getting worse.     · You do not get better as expected. Where can you learn more? Go to https://Inkvitetoyaeb.[place] InnoVentures. org and sign in to your Squarespace account. Enter [phone] in the [facility] box to learn more about \"Constipation: Care Instructions. \"     If you do not have an account, please click on the \"Sign Up Now\" link. Current as of: September 23, 2018  Content Version: 12.0  © 3589-4500 Healthwise, Incorporated. Care instructions adapted under license by [facility] [email or web address] [facility] ([facility]).  If you have questions about a medical condition or this instruction, always ask your healthcare professional. Shanna Palacio any warranty or liability for your use of this information. Patient Education        Abdominal Pain: Care Instructions  Your Care Instructions    Abdominal pain has many possible causes. Some aren't serious and get better on their own in a few days. Others need more testing and treatment. If your pain continues or gets worse, you need to be rechecked and may need more tests to find out what is wrong. You may need surgery to correct the problem. Don't ignore new symptoms, such as fever, nausea and vomiting, urination problems, pain that gets worse, and dizziness. These may be signs of a more serious problem. Your doctor may have recommended a follow-up visit in the next 8 to 12 hours. If you are not getting better, you may need more tests or treatment. The doctor has checked you carefully, but problems can develop later. If you notice any problems or new symptoms, get medical treatment right away. Follow-up care is a key part of your treatment and safety. Be sure to make and go to all appointments, and call your doctor if you are having problems. It's also a good idea to know your test results and keep a list of the medicines you take. How can you care for yourself at home? · Rest until you feel better. · To prevent dehydration, drink plenty of fluids, enough so that your urine is light yellow or clear like water. Choose water and other caffeine-free clear liquids until you feel better. If you have kidney, heart, or liver disease and have to limit fluids, talk with your doctor before you increase the amount of fluids you drink. · If your stomach is upset, eat mild foods, such as rice, dry toast or crackers, bananas, and applesauce. Try eating several small meals instead of two or three large ones. · Wait until 48 hours after all symptoms have gone away before you have spicy foods, alcohol, and drinks that contain caffeine. · Do not eat foods that are high in fat.   · Avoid anti-inflammatory medicines such as aspirin, ibuprofen (Advil, Motrin), and naproxen (Aleve). These can cause stomach upset. Talk to your doctor if you take daily aspirin for another health problem. When should you call for help? Call 911 anytime you think you may need emergency care. For example, call if:    · You passed out (lost consciousness).     · You pass maroon or very bloody stools.     · You vomit blood or what looks like coffee grounds.     · You have new, severe belly pain.    Call your doctor now or seek immediate medical care if:    · Your pain gets worse, especially if it becomes focused in one area of your belly.     · You have a new or higher fever.     · Your stools are black and look like tar, or they have streaks of blood.     · You have unexpected vaginal bleeding.     · You have symptoms of a urinary tract infection. These may include:  ? Pain when you urinate. ? Urinating more often than usual.  ? Blood in your urine.     · You are dizzy or lightheaded, or you feel like you may faint.    Watch closely for changes in your health, and be sure to contact your doctor if:    · You are not getting better after 1 day (24 hours). Where can you learn more? Go to https://MoveInSync.Tamr. org and sign in to your hiogi account. Enter W986 in the [employer]oc box to learn more about \"Abdominal Pain: Care Instructions. \"     If you do not have an account, please click on the \"Sign Up Now\" link. Current as of: September 23, 2018  Content Version: 12.0  © 9947-3580 Healthwise, Incorporated. Care instructions adapted under license by [facility] ([facility]). If you have questions about a medical condition or this instruction, always ask your healthcare professional. [place] [address] any warranty or liability for your use of this information. Patient Education        Anal Pain: Care Instructions  Your Care Instructions  Pain in the opening to the rectum (anus) can be caused by diarrhea or constipation or by scratching a rectal itch.  A Miralax, Milk of Magnesia, or Ex-Lax. Read and follow all instructions on the label, and do not use laxatives on a long-term basis. · Do not use over-the-counter ointments or creams without talking to your doctor. Some of these may not help. · Use baby wipes or medicated pads, such as Preparation H or Tucks, instead of toilet paper to clean after a bowel movement. These products do not irritate the anus. · Be safe with medicines. Read and follow all instructions on the label. ? If the doctor gave you a prescription medicine for pain, give it as prescribed. ? If you are not taking a prescription pain medicine, ask your doctor if you can take an over-the-counter medicine. When should you call for help? Call your doctor now or seek immediate medical care if:    · You have new or worse pain.     · You have new or worse bleeding from the rectum.    Watch closely for changes in your health, and be sure to contact your doctor if:    · You have trouble passing stools.     · You do not get better as expected. Where can you learn more? Go to https://ViClonepeEmbarke.Teachable. org and sign in to your CartoDB account. Enter 122 [id number] in the CaseReader box to learn more about \"Anal Pain: Care Instructions. \"     If you do not have an account, please click on the \"Sign Up Now\" link. Current as of: November 7, 2018  Content Version: 12.0  © 6297-0880 [employer]. Care instructions adapted under license by [facility] [address] If you have questions about a medical condition or this instruction, always ask your healthcare professional. [place] [address] any warranty or liability for your use of this information. Patient Education        Proctitis: Care Instructions  Your Care Instructions  Proctitis is inflammation of the lining of the rectum. It can be a short-term or long-term problem. Many things can cause proctitis.  It may be a side effect of medical treatments, such as Put a thin cloth between the ice and your skin. Follow this by placing a warm, wet towel on the area for another 10 to 20 minutes. · Take pain medicines exactly as directed. ? If the doctor gave you a prescription medicine for pain, take it as prescribed. ? If you are not taking a prescription pain medicine, ask your doctor if you can take an over-the-counter medicine. · Keep the anal area clean, but be gentle. Use water and a fragrance-free soap, such as [place], or use baby wipes or medicated pads, such as Tucks. · Wear cotton underwear and loose clothing to decrease moisture in the anal area. · Eat more fiber. Include foods such as whole-grain breads and cereals, raw vegetables, raw and dried fruits, and beans. · Drink plenty of fluids, enough so that your urine is light yellow or clear like water. If you have kidney, heart, or liver disease and have to limit fluids, talk with your doctor before you increase the amount of fluids you drink. · Use a stool softener that contains bran or psyllium. You can save money by buying bran or psyllium (available in bulk at most health food stores) and sprinkling it on foods or stirring it into fruit juice. Or you can use a product such as Metamucil or Hydrocil. · Practice healthy bowel habits. ? Go to the bathroom as soon as you have the urge. ? Avoid straining to pass stools. Relax and give yourself time to let things happen naturally. ? Do not hold your breath while passing stools. ? Do not read while sitting on the toilet. Get off the toilet as soon as you have finished. · Take your medicines exactly as prescribed. Call your doctor if you think you are having a problem with your medicine. When should you call for help? Call 911 anytime you think you may need emergency care.  For example, call if:    · You pass maroon or very bloody stools.    Call your doctor now or seek immediate medical care if:    · You have increased pain.     · You have increased bleeding.    Watch closely for changes in your health, and be sure to contact your doctor if:    · Your symptoms have not improved after 3 or 4 days. Where can you learn more? Go to https://GameGround.servtag. org and sign in to your [employer] account. Enter U212 in the Rev box to learn more about \"Hemorrhoids: Care Instructions. \"     If you do not have an account, please click on the \"Sign Up Now\" link. Current as of: November 7, 2018  Content Version: 12.0  © 5638-4348 Healthwise, Incorporated. Care instructions adapted under license by [facility] ([facility]). If you have questions about a medical condition or this instruction, always ask your healthcare professional. [address] any warranty or liability for your use of this information.
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
## 2019-06-13 NOTE — LETTER
MEDICATION AGREEMENT     Jayleen Coleman  [birth year]      For certain conditions, multiple classes of medications may be used to help better manage your symptoms, and to improve your ability to function at home, work and in social settings. However, these medications do have risks, which will be discussed with you, including addiction and dependency. The following prescribed medications need frequent monitoring and will require you to partner and assist in your healthcare. Medication  Dose, instructions and quantity as indicated on current prescription bottle Diagnosis/Reason(s) for Taking Category     ativan anxiety benzodiazepine                           Benefits and goals of Controlled Substance Medications: There are two potential goals for your treatment: (1) decreased pain and suffering (2) improved daily life functions. There are many possible treatments for your chronic condition(s), and, in addition to controlled substance medications, we will try alternatives such as physical therapy, yoga, massage, home daily exercise, meditation, relaxation techniques, injections, chiropractic manipulations, surgery, cognitive therapy, hypnosis and many medications that are not habit-forming. Use of controlled substance medications may be helpful, but they are unlikely to resolve all of your symptoms or restore all function. Risks of Controlled Substance Medications:    Opioid pain medications: These medications can lead to problems such as addiction/dependence, sedation, lightheadedness/dizziness, memory issues, falls, constipation, nausea, or vomiting. They may also impair the ability to drive or operate machinery. Additionally, these medications may lower testosterone levels, leading to loss of bone strength, stamina and sex drive.   They may cause problems with breathing, sleep apnea and reduced coughing, which are especially dangerous for patients with lung disease. Overdose or dangerous interactions with alcohol and other medications may occur, leading to death. Hyperalgesia may develop, in which patients receiving opioids for the treatment of pain may actually become more sensitive to certain painful stimuli, and in some cases, experience pain from ordinarily non-painful stimuli. Women between the ages of 14-53 who could become pregnant should carefully weigh the risks and benefits of opioids with their physicians, as these medications increase the risk of pregnancy complications, including miscarriage,  delivery and stillbirth. It is also possible for babies to be born addicted to opioids. Opioid dependence withdrawal symptoms may include; feelings of uneasiness, increased pain, irritability, belly pain, diarrhea, sweats and goose-flesh. Benzodiazepines and non-benzodiazepine sleep medications: These medications can lead to problems such as addiction/dependence, sedation, fatigue, lightheadedness, dizziness, incoordination, falls, depression, hallucinations, and impaired judgment, memory and concentration. The ability to drive and operate machinery may also be affected. Abnormal sleep-related behaviors have been reported, including sleep walking, driving, making telephone calls, eating, or having sex while not fully awake. These medications can suppress breathing and worsen sleep apnea, particularly when combined with alcohol or other sedating medications, potentially leading to death. Dependence withdrawal symptoms may include tremors, anxiety, hallucinations and seizures. Stimulants:  Common adverse effects include addiction/dependence, increased blood pressure and heart rate, decreased appetite, nausea, involuntary weight loss, insomnia, irritability, and headaches.   These risks may increase when these medications are combined with other stimulants, such as caffeine pills or energy drinks, certain weight loss supplements and oral decongestants. Dependence withdrawal symptoms may include depressed mood, loss of interest, suicidal thoughts, anxiety, fatigue, appetite changes and agitation. Testosterone replacement therapy:  Potential side effects include increased risk of stroke and heart attack, blood clots, increased blood pressure, increased cholesterol, enlarged prostate, sleep apnea, irritability/aggression and other mood disorders, and decreased fertility. Other:     1. I understand that I have the following responsibilities:  · I will take medications at the dose and frequency prescribed. · I will not increase or change how I take my medications without the approval of the health care provider who signs this Medication Agreement. · I will arrange for refills at the prescribed interval ONLY during regular office hours. I will not ask for refills earlier than agreed, after-hours, on holidays or on weekends. · I will obtain all refills for these medications at  ·  ____________________________________  pharmacy (phone number  ·  ________________________), with full consent for my provider and pharmacist to exchange information in writing or verbally. · I will not request any pain medications or controlled substances from other providers and will inform this provider of all other medications I am taking. · I will inform my other health care providers that I am taking these medications and of the existence of this [facility] [address]. In the event of an emergency, I will provide the same information to the emergency department providers. · I will protect my prescriptions and medications. I understand that lost or misplaced prescriptions will not be replaced. · I will keep medications only for my own use and will not share them with others. I will keep all medications away from children. · I agree to participate in any medical, psychological or psychiatric assessments recommended by my provider. which may also result in my being prevented from receiving further care from this office. · Other:____________________________________________________________________    AGREEMENT:    I have read the above and have had all of my questions answered. For chronic disease management, I know that my symptoms can be managed with many types of treatments. A chronic medication trial may be part of my treatment, but I must be an active participant in my care. Medication therapy is only one part of my symptom management plan. In some cases, there may be limited scientific evidence to support the chronic use of certain medications to improve symptoms and daily function. Furthermore, in certain circumstances, there may be scientific information that suggests that use of chronic controlled substances may actually worsen my symptoms and increase my risk of unintentional death directly related to this medication therapy. I know that if my provider feels my risk from controlled medications is greater than my benefit, I will have my controlled substance medication(s) compassionately lowered or removed altogether. I agree to a controlled substance medication trial.      I further agree to allow this office to contact my HIPAA contact on file if there are concerns about my safety and use of controlled medications. I have agreed to use the following medications above as instructed by my physician and as stated in this [facility] [id number].      Patient Signature:  ______________________  Date:6/13/2019 or _____________    Provider Signature:______________________  Date:6/13/2019 or _____________

## 2019-06-18 LAB
7-AMINOCLONAZEPAM, URINE: <5 NG/ML
ALPHA-HYDROXYALPRAZOLAM, URINE: <5 NG/ML
ALPHA-HYDROXYMIDAZOLAM, URINE: <20 NG/ML
ALPRAZOLAM, URINE: <5 NG/ML
CHLORDIAZEPOXIDE, URINE: <20 NG/ML
CLONAZEPAM, URINE: <5 NG/ML
DIAZEPAM, URINE: <20 NG/ML
LORAZEPAM, URINE: 234 NG/ML
MIDAZOLAM, URINE: <20 NG/ML
NORDIAZEPAM, URINE: <20 NG/ML
OXAZEPAM, URINE: <20 NG/ML
TEMAZEPAM, URINE: <20 NG/ML

## 2019-06-20 ENCOUNTER — PATIENT MESSAGE (OUTPATIENT)
Dept: FAMILY MEDICINE CLINIC | Age: 39
End: 2019-06-20

## 2019-06-20 ENCOUNTER — TELEPHONE (OUTPATIENT)
Dept: FAMILY MEDICINE CLINIC | Age: 39
End: 2019-06-20

## 2019-06-20 NOTE — TELEPHONE ENCOUNTER
She needs to call her insurance to confirm they will cover this. Once she has called them, please have her give us the fax number and we can sent the Rx over.

## 2019-06-20 NOTE — TELEPHONE ENCOUNTER
Pt calling to see if she can get a referral     Dexcom  G5 or 6 - do you know where she can go to get this or what she has to do?   No finger stick for diabetics      Pt @  [phone]

## 2019-06-22 DIAGNOSIS — F41.9 ANXIETY: ICD-10-CM

## 2019-06-22 DIAGNOSIS — F42.2 MIXED OBSESSIONAL THOUGHTS AND ACTS: ICD-10-CM

## 2019-06-22 DIAGNOSIS — F33.1 MODERATE EPISODE OF RECURRENT MAJOR DEPRESSIVE DISORDER (HCC): ICD-10-CM

## 2019-06-24 ENCOUNTER — PATIENT MESSAGE (OUTPATIENT)
Dept: FAMILY MEDICINE CLINIC | Age: 39
End: 2019-06-24

## 2019-06-24 DIAGNOSIS — R00.0 TACHYCARDIA: Primary | ICD-10-CM

## 2019-06-24 RX ORDER — ESCITALOPRAM OXALATE 10 MG/1
TABLET ORAL
Qty: 30 TABLET | Refills: 2 | OUTPATIENT
Start: 2019-06-24

## 2019-06-24 NOTE — TELEPHONE ENCOUNTER
Will not refill lexapro 10mg dose as dose was increased to 20mg/day at recent visit in June and new rx sent then

## 2019-06-24 NOTE — TELEPHONE ENCOUNTER
From: Swati Villarreal  To: TIARA Fuentes - CNP  Sent: 6/24/2019 5:29 AM EDT  Subject: Non-Urgent Medical Question    Is there anyway I can get a referral to a heart doctor very concern cause my heart rate is very high for some reason it's been running between 102 and 112and heart promblems run in my family

## 2019-06-28 ENCOUNTER — TELEPHONE (OUTPATIENT)
Dept: FAMILY MEDICINE CLINIC | Age: 39
End: 2019-06-28

## 2019-06-28 DIAGNOSIS — K59.00 CONSTIPATION, UNSPECIFIED CONSTIPATION TYPE: Primary | ICD-10-CM

## 2019-07-15 ENCOUNTER — TELEPHONE (OUTPATIENT)
Dept: FAMILY MEDICINE CLINIC | Age: 39
End: 2019-07-15

## 2019-07-15 NOTE — TELEPHONE ENCOUNTER
PT @  [phone]    SHE HAS AN APPT ON Tuesday 07/196/19 - SHE IS IN A LOT OF PAIN -- ON HER LEFT SIDE - SHE CAN HARDLY MOVE -- PLEASE CALL

## 2019-07-16 ENCOUNTER — OFFICE VISIT (OUTPATIENT)
Dept: FAMILY MEDICINE CLINIC | Age: 39
End: 2019-07-16
Payer: MEDICARE

## 2019-07-16 VITALS
WEIGHT: 255.2 LBS | DIASTOLIC BLOOD PRESSURE: 70 MMHG | BODY MASS INDEX: 48.18 KG/M2 | HEIGHT: 61 IN | SYSTOLIC BLOOD PRESSURE: 104 MMHG | TEMPERATURE: 98.4 F

## 2019-07-16 DIAGNOSIS — T14.8XXA SPRAIN AND STRAIN: Primary | ICD-10-CM

## 2019-07-16 LAB
BILIRUBIN, POC: ABNORMAL
BLOOD URINE, POC: ABNORMAL
CLARITY, POC: CLEAR
COLOR, POC: YELLOW
GLUCOSE URINE, POC: ABNORMAL
KETONES, POC: ABNORMAL
LEUKOCYTE EST, POC: ABNORMAL
NITRITE, POC: ABNORMAL
PH, POC: 6
PROTEIN, POC: ABNORMAL
SPECIFIC GRAVITY, POC: >=1.03
UROBILINOGEN, POC: 0.2

## 2019-07-16 PROCEDURE — 81002 URINALYSIS NONAUTO W/O SCOPE: CPT | Performed by: NURSE PRACTITIONER

## 2019-07-16 PROCEDURE — 99213 OFFICE O/P EST LOW 20 MIN: CPT | Performed by: NURSE PRACTITIONER

## 2019-07-16 PROCEDURE — G8417 CALC BMI ABV UP PARAM F/U: HCPCS | Performed by: NURSE PRACTITIONER

## 2019-07-16 PROCEDURE — 1036F TOBACCO NON-USER: CPT | Performed by: NURSE PRACTITIONER

## 2019-07-16 PROCEDURE — G8427 DOCREV CUR MEDS BY ELIG CLIN: HCPCS | Performed by: NURSE PRACTITIONER

## 2019-07-16 RX ORDER — ALBUTEROL SULFATE 90 UG/1
AEROSOL, METERED RESPIRATORY (INHALATION)
Qty: 1 INHALER | Refills: 1 | Status: SHIPPED | OUTPATIENT
Start: 2019-07-16 | End: 2019-09-04 | Stop reason: SDUPTHER

## 2019-07-16 RX ORDER — CYCLOBENZAPRINE HCL 5 MG
5 TABLET ORAL 2 TIMES DAILY PRN
Qty: 20 TABLET | Refills: 0 | Status: SHIPPED | OUTPATIENT
Start: 2019-07-16 | End: 2019-07-26

## 2019-07-16 RX ORDER — PREDNISONE 10 MG/1
TABLET ORAL
Qty: 20 TABLET | Refills: 0 | Status: SHIPPED | OUTPATIENT
Start: 2019-07-16 | End: 2019-08-26 | Stop reason: ALTCHOICE

## 2019-07-16 ASSESSMENT — ENCOUNTER SYMPTOMS: BACK PAIN: 1

## 2019-07-17 ENCOUNTER — APPOINTMENT (OUTPATIENT)
Dept: CT IMAGING | Age: 39
End: 2019-07-17
Payer: MEDICARE

## 2019-07-17 ENCOUNTER — HOSPITAL ENCOUNTER (EMERGENCY)
Age: 39
Discharge: HOME | End: 2019-07-17
Attending: EMERGENCY MEDICINE
Payer: MEDICARE

## 2019-07-17 VITALS
OXYGEN SATURATION: 99 % | BODY MASS INDEX: 48.15 KG/M2 | TEMPERATURE: 98.3 F | SYSTOLIC BLOOD PRESSURE: 150 MMHG | DIASTOLIC BLOOD PRESSURE: 65 MMHG | WEIGHT: 255 LBS | HEART RATE: 65 BPM | RESPIRATION RATE: 18 BRPM | HEIGHT: 61 IN

## 2019-07-17 DIAGNOSIS — N83.202 CYST OF LEFT OVARY: ICD-10-CM

## 2019-07-17 DIAGNOSIS — N39.0 URINARY TRACT INFECTION IN FEMALE: ICD-10-CM

## 2019-07-17 DIAGNOSIS — R10.9 FLANK PAIN: Primary | ICD-10-CM

## 2019-07-17 LAB
A/G RATIO: 1 (ref 1.1–2.2)
ALBUMIN SERPL-MCNC: 4.2 G/DL (ref 3.4–5)
ALP BLD-CCNC: 112 U/L (ref 40–129)
ALT SERPL-CCNC: 21 U/L (ref 10–40)
AMYLASE: 42 U/L (ref 25–115)
ANION GAP SERPL CALCULATED.3IONS-SCNC: 13 MMOL/L (ref 3–16)
AST SERPL-CCNC: 20 U/L (ref 15–37)
BACTERIA: ABNORMAL /HPF
BASOPHILS ABSOLUTE: 0.1 K/UL (ref 0–0.2)
BASOPHILS RELATIVE PERCENT: 1.3 %
BILIRUB SERPL-MCNC: 0.3 MG/DL (ref 0–1)
BILIRUBIN URINE: NEGATIVE
BLOOD, URINE: NEGATIVE
BUN BLDV-MCNC: 19 MG/DL (ref 7–20)
CALCIUM SERPL-MCNC: 10 MG/DL (ref 8.3–10.6)
CHLORIDE BLD-SCNC: 95 MMOL/L (ref 99–110)
CLARITY: ABNORMAL
CO2: 28 MMOL/L (ref 21–32)
COLOR: YELLOW
CREAT SERPL-MCNC: 1 MG/DL (ref 0.6–1.1)
EOSINOPHILS ABSOLUTE: 0.1 K/UL (ref 0–0.6)
EOSINOPHILS RELATIVE PERCENT: 1.2 %
EPITHELIAL CELLS, UA: 7 /HPF (ref 0–5)
GFR AFRICAN AMERICAN: >60
GFR NON-AFRICAN AMERICAN: >60
GLOBULIN: 4.4 G/DL
GLUCOSE BLD-MCNC: 197 MG/DL (ref 70–99)
GLUCOSE URINE: NEGATIVE MG/DL
HCG QUALITATIVE: NEGATIVE
HCG(URINE) PREGNANCY TEST: NEGATIVE
HCT VFR BLD CALC: 34.6 % (ref 36–48)
HEMOGLOBIN: 11.7 G/DL (ref 12–16)
HYALINE CASTS: 9 /LPF (ref 0–8)
KETONES, URINE: NEGATIVE MG/DL
LEUKOCYTE ESTERASE, URINE: ABNORMAL
LIPASE: 42 U/L (ref 13–60)
LYMPHOCYTES ABSOLUTE: 2.5 K/UL (ref 1–5.1)
LYMPHOCYTES RELATIVE PERCENT: 24 %
MCH RBC QN AUTO: 29.5 PG (ref 26–34)
MCHC RBC AUTO-ENTMCNC: 33.8 G/DL (ref 31–36)
MCV RBC AUTO: 87.3 FL (ref 80–100)
MICROSCOPIC EXAMINATION: YES
MONOCYTES ABSOLUTE: 0.8 K/UL (ref 0–1.3)
MONOCYTES RELATIVE PERCENT: 7.7 %
NEUTROPHILS ABSOLUTE: 7 K/UL (ref 1.7–7.7)
NEUTROPHILS RELATIVE PERCENT: 65.8 %
NITRITE, URINE: NEGATIVE
PDW BLD-RTO: 15.3 % (ref 12.4–15.4)
PH UA: 5 (ref 5–8)
PLATELET # BLD: 336 K/UL (ref 135–450)
PMV BLD AUTO: 7.1 FL (ref 5–10.5)
POTASSIUM SERPL-SCNC: 4.5 MMOL/L (ref 3.5–5.1)
PROTEIN UA: NEGATIVE MG/DL
RBC # BLD: 3.97 M/UL (ref 4–5.2)
RBC UA: 7 /HPF (ref 0–4)
SODIUM BLD-SCNC: 136 MMOL/L (ref 136–145)
SPECIFIC GRAVITY UA: 1.02 (ref 1–1.03)
TOTAL PROTEIN: 8.6 G/DL (ref 6.4–8.2)
URINE TYPE: ABNORMAL
UROBILINOGEN, URINE: 0.2 E.U./DL
WBC # BLD: 10.6 K/UL (ref 4–11)
WBC UA: 14 /HPF (ref 0–5)

## 2019-07-17 PROCEDURE — 99284 EMERGENCY DEPT VISIT MOD MDM: CPT

## 2019-07-17 PROCEDURE — 96361 HYDRATE IV INFUSION ADD-ON: CPT

## 2019-07-17 PROCEDURE — 85025 COMPLETE CBC W/AUTO DIFF WBC: CPT

## 2019-07-17 PROCEDURE — 74176 CT ABD & PELVIS W/O CONTRAST: CPT

## 2019-07-17 PROCEDURE — 6360000002 HC RX W HCPCS: Performed by: EMERGENCY MEDICINE

## 2019-07-17 PROCEDURE — 96374 THER/PROPH/DIAG INJ IV PUSH: CPT

## 2019-07-17 PROCEDURE — 82150 ASSAY OF AMYLASE: CPT

## 2019-07-17 PROCEDURE — 80053 COMPREHEN METABOLIC PANEL: CPT

## 2019-07-17 PROCEDURE — 83690 ASSAY OF LIPASE: CPT

## 2019-07-17 PROCEDURE — 2580000003 HC RX 258: Performed by: EMERGENCY MEDICINE

## 2019-07-17 PROCEDURE — 81001 URINALYSIS AUTO W/SCOPE: CPT

## 2019-07-17 PROCEDURE — 6370000000 HC RX 637 (ALT 250 FOR IP): Performed by: EMERGENCY MEDICINE

## 2019-07-17 PROCEDURE — 96375 TX/PRO/DX INJ NEW DRUG ADDON: CPT

## 2019-07-17 PROCEDURE — 87086 URINE CULTURE/COLONY COUNT: CPT

## 2019-07-17 PROCEDURE — 84703 CHORIONIC GONADOTROPIN ASSAY: CPT

## 2019-07-17 RX ORDER — KETOROLAC TROMETHAMINE 30 MG/ML
15 INJECTION, SOLUTION INTRAMUSCULAR; INTRAVENOUS ONCE
Status: COMPLETED | OUTPATIENT
Start: 2019-07-17 | End: 2019-07-17

## 2019-07-17 RX ORDER — 0.9 % SODIUM CHLORIDE 0.9 %
1000 INTRAVENOUS SOLUTION INTRAVENOUS ONCE
Status: COMPLETED | OUTPATIENT
Start: 2019-07-17 | End: 2019-07-17

## 2019-07-17 RX ORDER — CEPHALEXIN 500 MG/1
500 CAPSULE ORAL 3 TIMES DAILY
Qty: 21 CAPSULE | Refills: 0 | Status: SHIPPED | OUTPATIENT
Start: 2019-07-17 | End: 2019-07-22 | Stop reason: ALTCHOICE

## 2019-07-17 RX ORDER — PHENAZOPYRIDINE HYDROCHLORIDE 100 MG/1
200 TABLET, FILM COATED ORAL ONCE
Status: COMPLETED | OUTPATIENT
Start: 2019-07-17 | End: 2019-07-17

## 2019-07-17 RX ORDER — ONDANSETRON 2 MG/ML
4 INJECTION INTRAMUSCULAR; INTRAVENOUS ONCE
Status: COMPLETED | OUTPATIENT
Start: 2019-07-17 | End: 2019-07-17

## 2019-07-17 RX ORDER — CEPHALEXIN 250 MG/1
500 CAPSULE ORAL ONCE
Status: COMPLETED | OUTPATIENT
Start: 2019-07-17 | End: 2019-07-17

## 2019-07-17 RX ORDER — PHENAZOPYRIDINE HYDROCHLORIDE 200 MG/1
200 TABLET, FILM COATED ORAL 3 TIMES DAILY PRN
Qty: 6 TABLET | Refills: 0 | Status: SHIPPED | OUTPATIENT
Start: 2019-07-17 | End: 2019-07-20

## 2019-07-17 RX ADMIN — CEPHALEXIN 500 MG: 250 CAPSULE ORAL at 20:36

## 2019-07-17 RX ADMIN — PHENAZOPYRIDINE HYDROCHLORIDE 200 MG: 100 TABLET ORAL at 20:36

## 2019-07-17 RX ADMIN — SODIUM CHLORIDE 1000 ML: 9 INJECTION, SOLUTION INTRAVENOUS at 19:02

## 2019-07-17 RX ADMIN — KETOROLAC TROMETHAMINE 15 MG: 30 INJECTION, SOLUTION INTRAMUSCULAR; INTRAVENOUS at 19:02

## 2019-07-17 RX ADMIN — ONDANSETRON 4 MG: 2 INJECTION INTRAMUSCULAR; INTRAVENOUS at 19:02

## 2019-07-17 ASSESSMENT — PAIN SCALES - GENERAL
PAINLEVEL_OUTOF10: 3
PAINLEVEL_OUTOF10: 10
PAINLEVEL_OUTOF10: 10

## 2019-07-17 ASSESSMENT — PAIN DESCRIPTION - LOCATION: LOCATION: BACK;FLANK

## 2019-07-17 ASSESSMENT — PAIN DESCRIPTION - ORIENTATION: ORIENTATION: LEFT

## 2019-07-17 NOTE — ED PROVIDER NOTES
Mother     Alcohol Abuse Father     Seizures Sister     Depression Sister     Diabetes Brother     No Known Problems Maternal Grandmother     No Known Problems Maternal Grandfather     No Known Problems Paternal Grandmother     No Known Problems Paternal Grandfather     Mental Illness Sister     No Known Problems Brother     Substance Abuse Brother           SOCIAL HISTORY       Social History     Socioeconomic History    Marital status: Single     Spouse name: None    Number of children: None    Years of education: None    Highest education level: None   Occupational History    None   Social Needs    Financial resource strain: None    Food insecurity:     Worry: None     Inability: None    Transportation needs:     Medical: None     Non-medical: None   Tobacco Use    Smoking status: Never Smoker    Smokeless tobacco: Never Used    Tobacco comment: around 2nd hand smoke    Substance and Sexual Activity    Alcohol use: No    Drug use: No    Sexual activity: Never   Lifestyle    Physical activity:     Days per week: None     Minutes per session: None    Stress: None   Relationships    Social connections:     Talks on phone: None     Gets together: None     Attends Amish service: None     Active member of club or organization: None     Attends meetings of clubs or organizations: None     Relationship status: None    Intimate partner violence:     Fear of current or ex partner: None     Emotionally abused: None     Physically abused: None     Forced sexual activity: None   Other Topics Concern    None   Social History Narrative    None       SCREENINGS           PHYSICAL EXAM    (5+ for level 4, 8+ for level 5)     ED Triage Vitals [07/17/19 1709]   BP Temp Temp Source Pulse Resp SpO2 Height Weight   124/72 98.3 °F (36.8 °C) Oral 114 18 98 % 5' 1\" (1.549 m) 255 lb (115.7 kg)       Physical Exam    General: patient is in no acute distress  Head: Normocephalic atraumatic.  Mucous membranes are moist.  No oropharyngeal erythema.  Pupils are equal reactive to light.  EOMI. Heart: Regular rate and rhythm, no rubs murmurs or gallops. Lungs: Clear to auscultation bilaterally. Abdomen: Nondistended; mild L flank ttp. Extremities: No lower extremity edema. Neuro: Cranial nerves II through XII are tested and intact.  No focal neurologic deficit appreciated    DIAGNOSTIC RESULTS       RADIOLOGY (Per Emergency Physician): Interpretation per the Radiologist below, if available at the time of this note:  Ct Abdomen Pelvis Wo Contrast Additional Contrast? None    Result Date: 7/17/2019  EXAMINATION: CT OF THE ABDOMEN AND PELVIS WITHOUT CONTRAST 7/17/2019 7:13 pm TECHNIQUE: CT of the abdomen and pelvis was performed without the administration of intravenous contrast. Multiplanar reformatted images are provided for review. Dose modulation, iterative reconstruction, and/or weight based adjustment of the mA/kV was utilized to reduce the radiation dose to as low as reasonably achievable. COMPARISON: 04/25/2012 HISTORY: ORDERING SYSTEM PROVIDED HISTORY: ABDOMINAL PAIN TECHNOLOGIST PROVIDED HISTORY: Additional Contrast?->None Reason for Exam: Flank Pain (pt arrived via EMS from [facility] - states she stood up and felt dizzines and nauseated, states she took a flexeril today because she was seen [facility] for back and side pain, states he prescribed her flexeril and pred for a strained muscle, pt states she doesnt think it is a pulled muscle because it wraps around to the font of her pelvis) Acuity: Acute Type of Exam: Initial FINDINGS: Lower Chest: Partial visualization of calcified subcarinal lymph nodes, typically granulomatous. Basilar atelectasis. Organs: Lack of IV and oral contrast limits sensitivity for visceral organ pathology. Within the kidneys bilaterally, no renal calculi. No hydronephrosis or perinephric stranding. No ureteral calculus. Liver is fatty.   Coarse calcifications are

## 2019-07-19 LAB — URINE CULTURE, ROUTINE: NORMAL

## 2019-07-21 NOTE — PROGRESS NOTES
times daily. She reports using C-PAP machine nightly. [facility]  -Reports Cardiology appointment scheduled on 8/14  -Reports GI appointment scheduled on  July 30  -Reports follow up treatment with [facility]  -Reports follow up treatment with Neurology    Treatment Adherence:   Medication compliance:  compliant most of the time  Diet compliance:  compliant most of the time  Weight trend: increasing  Current exercise: no regular exercise  Barriers: none    Diabetes Mellitus Type 2: Current symptoms/problems include none. Home blood sugar records: patient does not test  Any episodes of hypoglycemia? no  Eye exam current (within one year): unknown  Tobacco history: She  reports that she has never smoked. She has never used smokeless tobacco.   Daily Aspirin? no    Hypertension:  Home blood pressure monitoring: No.  She is not adherent to a low sodium diet. Patient denies lightheadedness, blurred vision, palpitations and dry cough. Antihypertensive medication side effects: no medication side effects noted. Use of agents associated with hypertension: yes.         Lab Results   Component Value Date    LABA1C 6.4 07/22/2019    LABA1C 6.4 07/22/2019    LABA1C 6.8 04/22/2019     Lab Results   Component Value Date    LABMICR YES 07/17/2019    CREATININE 1.0 07/22/2019     Lab Results   Component Value Date    ALT 22 07/22/2019    AST 23 07/22/2019     Lab Results   Component Value Date    CHOL 206 (H) 11/13/2018    TRIG 254 (H) 11/13/2018    HDL 43 11/13/2018    LDLCALC 112 (H) 11/13/2018        Lab Results   Component Value Date    CHOL 206 (H) 11/13/2018    CHOL 208 (H) 07/16/2014    CHOL 182 03/12/2014     Lab Results   Component Value Date    TRIG 254 (H) 11/13/2018    TRIG 185 (H) 07/16/2014    TRIG 109 03/12/2014     Lab Results   Component Value Date    HDL 43 11/13/2018    HDL 39 (L) 07/16/2014    HDL 40 03/12/2014     Lab Results   Component Value Date    LDLCALC 112 (H) 11/13/2018    [facility] Migraines, neuralgic     Obesity 7/30/2012    Ovarian cyst     left    Pulmonary hypertension [facility])        Past Surgical History:   Procedure Laterality Date    BREAST REDUCTION SURGERY  05/07    Reza Palacios CARPAL TUNNEL RELEASE Right 01/15/2019    ENDOMETRIAL ABLATION      LUMBAR SPINE SURGERY Bilateral 5/1/2019    BILATERAL L5 TRANSFORAMINAL EPIDURAL STEROID INJECTION WITH FLUOROSCOPY performed by Alexus De Leon MD at [facility] History     Socioeconomic History    Marital status: Single     Spouse name: Not on file    Number of children: 0    Years of education: Not on file    Highest education level: 11th grade   Occupational History    Not on file   Social Needs    Financial resource strain: Not on file    Food insecurity:     Worry: Not on file     Inability: Not on file    Transportation needs:     Medical: Not on file     Non-medical: Not on file   Tobacco Use    Smoking status: Never Smoker    Smokeless tobacco: Never Used    Tobacco comment: around 2nd hand smoke    Substance and Sexual Activity    Alcohol use: No    Drug use: No    Sexual activity: Never   Lifestyle    Physical activity:     Days per week: Not on file     Minutes per session: Not on file    Stress: Not on file   Relationships    Social connections:     Talks on phone: Not on file     Gets together: Not on file     Attends Roman Catholic service: Not on file     Active member of club or organization: Not on file     Attends meetings of clubs or organizations: Not on file     Relationship status: Not on file    Intimate partner violence:     Fear of current or ex partner: No     Emotionally abused: No     Physically abused: No     Forced sexual activity: No   Other Topics Concern    Not on file   Social History Narrative    Lives with sister, sisters boyfriend and his mother        Family History   Problem Relation Age of Onset    Diabetes Mother     High Blood Pressure Mother     Alcohol Abuse Father

## 2019-07-22 ENCOUNTER — OFFICE VISIT (OUTPATIENT)
Dept: INTERNAL MEDICINE CLINIC | Age: 39
End: 2019-07-22
Payer: MEDICARE

## 2019-07-22 VITALS
OXYGEN SATURATION: 98 % | DIASTOLIC BLOOD PRESSURE: 78 MMHG | HEIGHT: 61 IN | SYSTOLIC BLOOD PRESSURE: 118 MMHG | HEART RATE: 96 BPM | BODY MASS INDEX: 48.64 KG/M2 | WEIGHT: 257.6 LBS

## 2019-07-22 DIAGNOSIS — J45.20 INTERMITTENT ASTHMA WITHOUT COMPLICATION, UNSPECIFIED ASTHMA SEVERITY: ICD-10-CM

## 2019-07-22 DIAGNOSIS — Z00.00 HEALTHCARE MAINTENANCE: ICD-10-CM

## 2019-07-22 DIAGNOSIS — E11.9 TYPE 2 DIABETES MELLITUS WITHOUT COMPLICATION, WITHOUT LONG-TERM CURRENT USE OF INSULIN (HCC): Primary | ICD-10-CM

## 2019-07-22 DIAGNOSIS — K21.9 GASTROESOPHAGEAL REFLUX DISEASE WITHOUT ESOPHAGITIS: ICD-10-CM

## 2019-07-22 DIAGNOSIS — E66.01 CLASS 3 SEVERE OBESITY WITHOUT SERIOUS COMORBIDITY WITH BODY MASS INDEX (BMI) OF 45.0 TO 49.9 IN ADULT, UNSPECIFIED OBESITY TYPE (HCC): ICD-10-CM

## 2019-07-22 DIAGNOSIS — I10 HTN (HYPERTENSION), BENIGN: ICD-10-CM

## 2019-07-22 LAB
A/G RATIO: 1.4 (ref 1.1–2.2)
ALBUMIN SERPL-MCNC: 4.6 G/DL (ref 3.4–5)
ALP BLD-CCNC: 100 U/L (ref 40–129)
ALT SERPL-CCNC: 22 U/L (ref 10–40)
ANION GAP SERPL CALCULATED.3IONS-SCNC: 15 MMOL/L (ref 3–16)
AST SERPL-CCNC: 23 U/L (ref 15–37)
BASOPHILS ABSOLUTE: 0.1 K/UL (ref 0–0.2)
BASOPHILS RELATIVE PERCENT: 0.6 %
BILIRUB SERPL-MCNC: <0.2 MG/DL (ref 0–1)
BUN BLDV-MCNC: 19 MG/DL (ref 7–20)
CALCIUM SERPL-MCNC: 10.1 MG/DL (ref 8.3–10.6)
CHLORIDE BLD-SCNC: 94 MMOL/L (ref 99–110)
CO2: 27 MMOL/L (ref 21–32)
CREAT SERPL-MCNC: 1 MG/DL (ref 0.6–1.1)
CREATININE URINE POCT: 9.3
EOSINOPHILS ABSOLUTE: 0.1 K/UL (ref 0–0.6)
EOSINOPHILS RELATIVE PERCENT: 1.1 %
GFR AFRICAN AMERICAN: >60
GFR NON-AFRICAN AMERICAN: >60
GLOBULIN: 3.2 G/DL
GLUCOSE BLD-MCNC: 131 MG/DL (ref 70–99)
HBA1C MFR BLD: 6.4 %
HCT VFR BLD CALC: 33.6 % (ref 36–48)
HEMOGLOBIN: 11.3 G/DL (ref 12–16)
LYMPHOCYTES ABSOLUTE: 2 K/UL (ref 1–5.1)
LYMPHOCYTES RELATIVE PERCENT: 20.5 %
MCH RBC QN AUTO: 29.9 PG (ref 26–34)
MCHC RBC AUTO-ENTMCNC: 33.7 G/DL (ref 31–36)
MCV RBC AUTO: 88.7 FL (ref 80–100)
MICROALBUMIN/CREAT 24H UR: 12.3 MG/G{CREAT}
MICROALBUMIN/CREAT UR-RTO: 75.6
MONOCYTES ABSOLUTE: 0.6 K/UL (ref 0–1.3)
MONOCYTES RELATIVE PERCENT: 6.3 %
NEUTROPHILS ABSOLUTE: 7 K/UL (ref 1.7–7.7)
NEUTROPHILS RELATIVE PERCENT: 71.5 %
PDW BLD-RTO: 15.4 % (ref 12.4–15.4)
PLATELET # BLD: 309 K/UL (ref 135–450)
PMV BLD AUTO: 7.1 FL (ref 5–10.5)
POTASSIUM SERPL-SCNC: 4.5 MMOL/L (ref 3.5–5.1)
RBC # BLD: 3.79 M/UL (ref 4–5.2)
SODIUM BLD-SCNC: 136 MMOL/L (ref 136–145)
T4 FREE: 0.9 NG/DL (ref 0.9–1.8)
TOTAL PROTEIN: 7.8 G/DL (ref 6.4–8.2)
TSH REFLEX: 1.53 UIU/ML (ref 0.27–4.2)
WBC # BLD: 9.7 K/UL (ref 4–11)

## 2019-07-22 PROCEDURE — 99214 OFFICE O/P EST MOD 30 MIN: CPT | Performed by: NURSE PRACTITIONER

## 2019-07-22 PROCEDURE — 83036 HEMOGLOBIN GLYCOSYLATED A1C: CPT | Performed by: NURSE PRACTITIONER

## 2019-07-22 PROCEDURE — 82044 UR ALBUMIN SEMIQUANTITATIVE: CPT | Performed by: NURSE PRACTITIONER

## 2019-07-22 PROCEDURE — G8417 CALC BMI ABV UP PARAM F/U: HCPCS | Performed by: NURSE PRACTITIONER

## 2019-07-22 PROCEDURE — 2022F DILAT RTA XM EVC RTNOPTHY: CPT | Performed by: NURSE PRACTITIONER

## 2019-07-22 PROCEDURE — G8427 DOCREV CUR MEDS BY ELIG CLIN: HCPCS | Performed by: NURSE PRACTITIONER

## 2019-07-22 PROCEDURE — 1036F TOBACCO NON-USER: CPT | Performed by: NURSE PRACTITIONER

## 2019-07-22 PROCEDURE — 3044F HG A1C LEVEL LT 7.0%: CPT | Performed by: NURSE PRACTITIONER

## 2019-07-22 PROCEDURE — 36415 COLL VENOUS BLD VENIPUNCTURE: CPT | Performed by: NURSE PRACTITIONER

## 2019-07-22 RX ORDER — NAPROXEN 500 MG/1
500 TABLET ORAL 2 TIMES DAILY WITH MEALS
Qty: 60 TABLET | Refills: 0 | Status: SHIPPED | OUTPATIENT
Start: 2019-07-22 | End: 2019-09-26 | Stop reason: ALTCHOICE

## 2019-07-22 RX ORDER — MONTELUKAST SODIUM 10 MG/1
10 TABLET ORAL DAILY
Qty: 30 TABLET | Refills: 3 | Status: SHIPPED | OUTPATIENT
Start: 2019-07-22 | End: 2020-10-19

## 2019-07-22 SDOH — SOCIAL STABILITY: SOCIAL INSECURITY: WITHIN THE LAST YEAR, HAVE YOU BEEN AFRAID OF YOUR PARTNER OR EX-PARTNER?: NO

## 2019-07-22 SDOH — SOCIAL STABILITY: SOCIAL INSECURITY
WITHIN THE LAST YEAR, HAVE YOU BEEN KICKED, HIT, SLAPPED, OR OTHERWISE PHYSICALLY HURT BY YOUR PARTNER OR EX-PARTNER?: NO

## 2019-07-22 SDOH — SOCIAL STABILITY: SOCIAL INSECURITY: WITHIN THE LAST YEAR, HAVE YOU BEEN HUMILIATED OR EMOTIONALLY ABUSED IN OTHER WAYS BY YOUR PARTNER OR EX-PARTNER?: NO

## 2019-07-22 SDOH — SOCIAL STABILITY: SOCIAL INSECURITY
WITHIN THE LAST YEAR, HAVE TO BEEN RAPED OR FORCED TO HAVE ANY KIND OF SEXUAL ACTIVITY BY YOUR PARTNER OR EX-PARTNER?: NO

## 2019-07-22 ASSESSMENT — ENCOUNTER SYMPTOMS
WHEEZING: 1
SHORTNESS OF BREATH: 1
CHEST TIGHTNESS: 0

## 2019-07-23 LAB
ESTIMATED AVERAGE GLUCOSE: 137 MG/DL
HBA1C MFR BLD: 6.4 %

## 2019-07-23 ASSESSMENT — ENCOUNTER SYMPTOMS
BACK PAIN: 1
TROUBLE SWALLOWING: 0
SORE THROAT: 0
HEARTBURN: 1

## 2019-07-25 ENCOUNTER — TELEPHONE (OUTPATIENT)
Dept: INTERNAL MEDICINE CLINIC | Age: 39
End: 2019-07-25

## 2019-07-25 LAB
2000687N OAK TREE IGE: <0.1 KU/L
ALLERGEN ASPERGILLUS ALTERNATA IGE: <0.1 KU/L
ALLERGEN ASPERGILLUS FUMIGATUS IGE: <0.1 KU/L
ALLERGEN BERMUDA GRASS IGE: <0.1 KU/L
ALLERGEN BIRCH IGE: <0.1 KU/L
ALLERGEN CAT DANDER IGE: <0.1 KU/L
ALLERGEN COMMON SHORT RAGWEED IGE: NORMAL KU/L
ALLERGEN COTTONWOOD: <0.1 KU/L
ALLERGEN COW MILK IGE: <0.1 KU/L
ALLERGEN DOG DANDER IGE: <0.1 KU/L
ALLERGEN ELM IGE: <0.1 KU/L
ALLERGEN FUNGI/MOLD M.RACEMOSUS IGE: <0.1 KU/L
ALLERGEN GERMAN COCKROACH IGE: <0.1 KU/L
ALLERGEN HORMODENDRUM HORDEI IGE: <0.1 KU/L
ALLERGEN MAPLE/BOX ELDER IGE: <0.1 KU/L
ALLERGEN MITE DUST FARINAE IGE: <0.1 KU/L
ALLERGEN MITE DUST PTERONYSSINUS IGE: <0.1 KU/L
ALLERGEN MOUNTAIN CEDAR: <0.1 KU/L
ALLERGEN MOUSE EPITHELIA IGE: 0.2 KU/L
ALLERGEN PEANUT (F13) IGE: <0.1 KU/L
ALLERGEN PECAN TREE IGE: NORMAL KU/L
ALLERGEN PENICILLIUM NOTATUM: <0.1 KU/L
ALLERGEN ROUGH PIGWEED (W14) IGE: NORMAL KU/L
ALLERGEN RUSSIAN THISTLE IGE: NORMAL KU/L
ALLERGEN SEE NOTE: NORMAL
ALLERGEN SHEEP SORREL (W18) IGE: NORMAL KU/L
ALLERGEN TIMOTHY GRASS: <0.1 KU/L
ALLERGEN TREE SYCAMORE: <0.1 KU/L
ALLERGEN WALNUT TREE IGE: <0.1 KU/L
ALLERGEN WHITE MULBERRY TREE, IGE: <0.1 KU/L
ALLERGEN, TREE, WHITE ASH IGE: <0.1 KU/L
IGE: 3 KU/L

## 2019-07-26 ENCOUNTER — TELEPHONE (OUTPATIENT)
Dept: INTERNAL MEDICINE CLINIC | Age: 39
End: 2019-07-26

## 2019-07-30 ENCOUNTER — TELEPHONE (OUTPATIENT)
Dept: INTERNAL MEDICINE CLINIC | Age: 39
End: 2019-07-30

## 2019-07-30 DIAGNOSIS — H92.09 EAR PAIN, UNSPECIFIED LATERALITY: Primary | ICD-10-CM

## 2019-08-01 ENCOUNTER — TELEPHONE (OUTPATIENT)
Dept: ORTHOPEDIC SURGERY | Age: 39
End: 2019-08-01

## 2019-08-05 ENCOUNTER — TELEPHONE (OUTPATIENT)
Dept: FAMILY MEDICINE CLINIC | Age: 39
End: 2019-08-05

## 2019-08-07 ENCOUNTER — TELEPHONE (OUTPATIENT)
Dept: INTERNAL MEDICINE CLINIC | Age: 39
End: 2019-08-07

## 2019-08-08 NOTE — TELEPHONE ENCOUNTER
Patient calling again to verify if paper work was received by fax, filled out and sent back.  Please advise

## 2019-08-15 ENCOUNTER — TELEPHONE (OUTPATIENT)
Dept: INTERNAL MEDICINE CLINIC | Age: 39
End: 2019-08-15

## 2019-08-15 ENCOUNTER — TELEPHONE (OUTPATIENT)
Dept: FAMILY MEDICINE CLINIC | Age: 39
End: 2019-08-15

## 2019-08-15 PROBLEM — J30.9 ALLERGIC RHINITIS: Status: ACTIVE | Noted: 2019-08-15

## 2019-08-19 ENCOUNTER — TELEPHONE (OUTPATIENT)
Dept: FAMILY MEDICINE CLINIC | Age: 39
End: 2019-08-19

## 2019-08-20 ENCOUNTER — TELEPHONE (OUTPATIENT)
Dept: INTERNAL MEDICINE CLINIC | Age: 39
End: 2019-08-20

## 2019-08-23 PROBLEM — R09.02 HYPOXEMIA: Status: ACTIVE | Noted: 2019-05-07

## 2019-08-23 PROBLEM — K58.2 IRRITABLE BOWEL SYNDROME WITH BOTH CONSTIPATION AND DIARRHEA: Status: ACTIVE | Noted: 2018-03-14

## 2019-08-23 PROBLEM — E78.2 MIXED HYPERLIPIDEMIA: Status: ACTIVE | Noted: 2019-08-23

## 2019-08-23 PROBLEM — G56.01 CARPAL TUNNEL SYNDROME, RIGHT: Status: ACTIVE | Noted: 2019-03-13

## 2019-08-23 PROBLEM — R73.09 OTHER ABNORMAL GLUCOSE: Status: ACTIVE | Noted: 2019-08-23

## 2019-08-23 PROBLEM — G43.909 MIGRAINE: Status: ACTIVE | Noted: 2018-04-09

## 2019-08-23 PROBLEM — Z98.890 S/P ENDOMETRIAL ABLATION: Status: ACTIVE | Noted: 2018-05-16

## 2019-08-23 PROBLEM — N92.1 MENORRHAGIA WITH IRREGULAR CYCLE: Status: ACTIVE | Noted: 2018-04-30

## 2019-08-23 PROBLEM — M54.81 CERVICO-OCCIPITAL NEURALGIA: Status: ACTIVE | Noted: 2019-01-31

## 2019-08-23 PROBLEM — F33.1 MODERATE EPISODE OF RECURRENT MAJOR DEPRESSIVE DISORDER (HCC): Status: ACTIVE | Noted: 2019-08-23

## 2019-08-23 PROBLEM — M77.30 CALCANEAL SPUR: Status: ACTIVE | Noted: 2019-08-23

## 2019-08-23 PROBLEM — D50.9 IRON DEFICIENCY ANEMIA: Status: ACTIVE | Noted: 2017-11-17

## 2019-08-23 PROBLEM — D50.0 IRON DEFICIENCY ANEMIA DUE TO CHRONIC BLOOD LOSS: Status: ACTIVE | Noted: 2018-05-01

## 2019-08-23 PROBLEM — M54.2 CERVICALGIA: Status: ACTIVE | Noted: 2019-04-18

## 2019-08-23 PROBLEM — L82.0 INFLAMED SEBORRHEIC KERATOSIS: Status: ACTIVE | Noted: 2018-08-08

## 2019-08-23 PROBLEM — M72.2 PLANTAR FASCIITIS: Status: ACTIVE | Noted: 2019-08-23

## 2019-08-23 PROBLEM — J45.21 MILD INTERMITTENT ASTHMA WITH (ACUTE) EXACERBATION: Status: ACTIVE | Noted: 2019-08-23

## 2019-08-23 PROBLEM — B35.1 ONYCHOMYCOSIS: Status: ACTIVE | Noted: 2018-04-02

## 2019-08-23 PROBLEM — Z86.69 HX OF MIGRAINES: Status: ACTIVE | Noted: 2019-08-23

## 2019-08-23 PROBLEM — G56.02 CARPAL TUNNEL SYNDROME, LEFT: Status: ACTIVE | Noted: 2019-03-13

## 2019-08-23 PROBLEM — R03.0 ELEVATED BLOOD PRESSURE READING WITHOUT DIAGNOSIS OF HYPERTENSION: Status: ACTIVE | Noted: 2019-08-23

## 2019-08-23 PROBLEM — E55.9 VITAMIN D DEFICIENCY: Status: ACTIVE | Noted: 2019-01-31

## 2019-08-23 PROBLEM — M54.41 LOW BACK PAIN WITH RIGHT-SIDED SCIATICA: Status: ACTIVE | Noted: 2018-01-29

## 2019-08-23 PROBLEM — B35.3 TINEA PEDIS: Status: ACTIVE | Noted: 2018-04-02

## 2019-08-26 ENCOUNTER — OFFICE VISIT (OUTPATIENT)
Dept: INTERNAL MEDICINE CLINIC | Age: 39
End: 2019-08-26
Payer: MEDICARE

## 2019-08-26 VITALS
OXYGEN SATURATION: 98 % | SYSTOLIC BLOOD PRESSURE: 107 MMHG | BODY MASS INDEX: 51.53 KG/M2 | DIASTOLIC BLOOD PRESSURE: 75 MMHG | WEIGHT: 255.6 LBS | HEART RATE: 95 BPM | HEIGHT: 59 IN | TEMPERATURE: 97.2 F

## 2019-08-26 DIAGNOSIS — Z00.00 HEALTHCARE MAINTENANCE: ICD-10-CM

## 2019-08-26 DIAGNOSIS — R05.9 COUGH: ICD-10-CM

## 2019-08-26 DIAGNOSIS — I10 HTN (HYPERTENSION), BENIGN: Primary | ICD-10-CM

## 2019-08-26 PROCEDURE — G8427 DOCREV CUR MEDS BY ELIG CLIN: HCPCS | Performed by: NURSE PRACTITIONER

## 2019-08-26 PROCEDURE — G8417 CALC BMI ABV UP PARAM F/U: HCPCS | Performed by: NURSE PRACTITIONER

## 2019-08-26 PROCEDURE — 1036F TOBACCO NON-USER: CPT | Performed by: NURSE PRACTITIONER

## 2019-08-26 PROCEDURE — 99215 OFFICE O/P EST HI 40 MIN: CPT | Performed by: NURSE PRACTITIONER

## 2019-08-26 NOTE — PROGRESS NOTES
canal normal.   Nose: Nose normal.   Mouth/Throat: Uvula is midline, oropharynx is clear and moist and mucous membranes are normal.   Eyes: Pupils are equal, round, and reactive to light. Conjunctivae, EOM and lids are normal.   Neck: Trachea normal, normal range of motion and full passive range of motion without pain. Neck supple. Cardiovascular: Normal rate, regular rhythm, S1 normal, S2 normal, normal heart sounds and intact distal pulses. Pulmonary/Chest: Effort normal and breath sounds normal.   Abdominal: Soft. Normal appearance and bowel sounds are normal.   Lymphadenopathy:        Head (right side): No submental, no submandibular and no tonsillar adenopathy present. Head (left side): No submental, no submandibular and no tonsillar adenopathy present. Right cervical: No superficial cervical adenopathy present. Left cervical: No superficial cervical adenopathy present. Neurological: She is alert and oriented to person, place, and time. She has normal strength and normal reflexes. GCS eye subscore is 4. GCS verbal subscore is 5. GCS motor subscore is 6. Skin: Skin is warm, dry and intact. Psychiatric: She has a normal mood and affect. Her speech is normal and behavior is normal. Judgment and thought content normal. Cognition and memory are normal.       ASSESSMENT/PLAN:  1. HTN (hypertension), benign  -controlled    2. Healthcare maintenance  -labs drawn today  - HEPATITIS B CORE ANTIBODY, IGM; Future  - RPR Titer; Future  - Rubeola Antibody, IgM; Future  - MUMPS ANTIBODY, IGM; Future  - HEPATITIS B CORE ANTIBODY, TOTAL; Future  - HIV-1 AND HIV-2 ANTIBODIES; Future  - ENTEROVIRUS, PCR; Future    3.  Cough  -Drink plenty of fluids daily  Take mucinex for 7-10 days  Use flonase for nasal congestion  Gargle with warm salt water twice daily  Return in 2 days for PPD reading  Use humidifier if dry in home environment  Use Cepacol lozenges as need for sore throat     Return in about 3 months (around 11/26/2019), or if symptoms worsen or fail to improve.         --TIARA Medrano - CNP on 8/27/2019 at 6:20 PM

## 2019-08-27 ASSESSMENT — ENCOUNTER SYMPTOMS
ABDOMINAL PAIN: 0
COUGH: 1
CHEST TIGHTNESS: 0
DIARRHEA: 0
CONSTIPATION: 0
SHORTNESS OF BREATH: 0

## 2019-08-31 ENCOUNTER — HOSPITAL ENCOUNTER (OUTPATIENT)
Age: 39
Discharge: HOME | End: 2019-08-31
Payer: MEDICARE

## 2019-08-31 DIAGNOSIS — Z00.00 HEALTHCARE MAINTENANCE: ICD-10-CM

## 2019-08-31 LAB — HEPATITIS B CORE IGM ANTIBODY: NORMAL

## 2019-08-31 PROCEDURE — 36415 COLL VENOUS BLD VENIPUNCTURE: CPT

## 2019-08-31 PROCEDURE — 86702 HIV-2 ANTIBODY: CPT

## 2019-08-31 PROCEDURE — 86704 HEP B CORE ANTIBODY TOTAL: CPT

## 2019-08-31 PROCEDURE — 86701 HIV-1ANTIBODY: CPT

## 2019-08-31 PROCEDURE — 86735 MUMPS ANTIBODY: CPT

## 2019-08-31 PROCEDURE — 86765 RUBEOLA ANTIBODY: CPT

## 2019-08-31 PROCEDURE — 86705 HEP B CORE ANTIBODY IGM: CPT

## 2019-08-31 PROCEDURE — 87498 ENTEROVIRUS PROBE&REVRS TRNS: CPT

## 2019-08-31 PROCEDURE — 87390 HIV-1 AG IA: CPT

## 2019-08-31 PROCEDURE — 0065U SYFLS TST NONTREPONEMAL ANTB: CPT

## 2019-09-01 LAB
HIV AG/AB: NORMAL
HIV ANTIGEN: NORMAL
HIV-1 ANTIBODY: NORMAL
HIV-2 AB: NORMAL

## 2019-09-02 LAB — HEPATITIS B CORE TOTAL ANTIBODY: NEGATIVE

## 2019-09-03 ENCOUNTER — TELEPHONE (OUTPATIENT)
Dept: INTERNAL MEDICINE CLINIC | Age: 39
End: 2019-09-03

## 2019-09-03 LAB
MISCELLANEOUS LAB TEST ORDER: NORMAL
MUMPS IGM ANTIBODY: 0.25 IV
RUBEOLA IGM: 0.19 AU (ref 0–0.79)

## 2019-09-04 ENCOUNTER — TELEPHONE (OUTPATIENT)
Dept: INTERNAL MEDICINE CLINIC | Age: 39
End: 2019-09-04

## 2019-09-04 ENCOUNTER — TELEPHONE (OUTPATIENT)
Dept: FAMILY MEDICINE CLINIC | Age: 39
End: 2019-09-04

## 2019-09-04 RX ORDER — ALBUTEROL SULFATE 90 UG/1
AEROSOL, METERED RESPIRATORY (INHALATION)
Qty: 1 INHALER | Refills: 3 | Status: SHIPPED | OUTPATIENT
Start: 2019-09-04 | End: 2020-11-04

## 2019-09-04 NOTE — TELEPHONE ENCOUNTER
Pt. called to see which immunizations she is in need of so she can ramakrishna her shots at her local [place].  Best contact [phone]

## 2019-09-05 ENCOUNTER — TELEPHONE (OUTPATIENT)
Dept: INTERNAL MEDICINE CLINIC | Age: 39
End: 2019-09-05

## 2019-09-06 ENCOUNTER — TELEPHONE (OUTPATIENT)
Dept: INTERNAL MEDICINE CLINIC | Age: 39
End: 2019-09-06

## 2019-09-06 ENCOUNTER — NURSE ONLY (OUTPATIENT)
Dept: INTERNAL MEDICINE CLINIC | Age: 39
End: 2019-09-06
Payer: MEDICARE

## 2019-09-06 DIAGNOSIS — Z23 NEED FOR INFLUENZA VACCINATION: ICD-10-CM

## 2019-09-06 DIAGNOSIS — Z23 NEED FOR HEPATITIS B VACCINATION: Primary | ICD-10-CM

## 2019-09-06 LAB
ENTEROVIRUS DETECTION PCR: NOT DETECTED
ENTEROVIRUS SOURCE: NORMAL

## 2019-09-06 PROCEDURE — 90686 IIV4 VACC NO PRSV 0.5 ML IM: CPT | Performed by: NURSE PRACTITIONER

## 2019-09-06 PROCEDURE — 90746 HEPB VACCINE 3 DOSE ADULT IM: CPT | Performed by: NURSE PRACTITIONER

## 2019-09-06 PROCEDURE — G0008 ADMIN INFLUENZA VIRUS VAC: HCPCS | Performed by: NURSE PRACTITIONER

## 2019-09-06 PROCEDURE — G0010 ADMIN HEPATITIS B VACCINE: HCPCS | Performed by: NURSE PRACTITIONER

## 2019-09-09 ENCOUNTER — TELEPHONE (OUTPATIENT)
Dept: PAIN MANAGEMENT | Age: 39
End: 2019-09-09

## 2019-09-09 RX ORDER — GUAIFENESIN/DEXTROMETHORPHAN 100-10MG/5
5 SYRUP ORAL 3 TIMES DAILY PRN
Qty: 120 ML | Refills: 0 | Status: SHIPPED | OUTPATIENT
Start: 2019-09-09 | End: 2019-09-19

## 2019-09-10 ENCOUNTER — TELEPHONE (OUTPATIENT)
Dept: INTERNAL MEDICINE CLINIC | Age: 39
End: 2019-09-10

## 2019-09-12 ENCOUNTER — NURSE TRIAGE (OUTPATIENT)
Dept: OTHER | Facility: CLINIC | Age: 39
End: 2019-09-12

## 2019-09-13 ENCOUNTER — OFFICE VISIT (OUTPATIENT)
Dept: PSYCHIATRY | Age: 39
End: 2019-09-13
Payer: MEDICARE

## 2019-09-13 ENCOUNTER — OFFICE VISIT (OUTPATIENT)
Dept: INTERNAL MEDICINE CLINIC | Age: 39
End: 2019-09-13
Payer: MEDICARE

## 2019-09-13 VITALS
DIASTOLIC BLOOD PRESSURE: 92 MMHG | BODY MASS INDEX: 48.33 KG/M2 | WEIGHT: 256 LBS | HEIGHT: 61 IN | HEART RATE: 83 BPM | SYSTOLIC BLOOD PRESSURE: 115 MMHG

## 2019-09-13 VITALS
DIASTOLIC BLOOD PRESSURE: 86 MMHG | HEIGHT: 61 IN | OXYGEN SATURATION: 98 % | BODY MASS INDEX: 48.37 KG/M2 | WEIGHT: 256.2 LBS | HEART RATE: 93 BPM | SYSTOLIC BLOOD PRESSURE: 124 MMHG

## 2019-09-13 DIAGNOSIS — E55.9 VITAMIN D DEFICIENCY: ICD-10-CM

## 2019-09-13 DIAGNOSIS — R05.9 COUGH: ICD-10-CM

## 2019-09-13 DIAGNOSIS — G44.029 CHRONIC CLUSTER HEADACHE, NOT INTRACTABLE: Primary | ICD-10-CM

## 2019-09-13 DIAGNOSIS — F42.2 MIXED OBSESSIONAL THOUGHTS AND ACTS: ICD-10-CM

## 2019-09-13 DIAGNOSIS — F41.9 ANXIETY: Primary | ICD-10-CM

## 2019-09-13 DIAGNOSIS — F33.1 MODERATE EPISODE OF RECURRENT MAJOR DEPRESSIVE DISORDER (HCC): ICD-10-CM

## 2019-09-13 DIAGNOSIS — I10 HTN (HYPERTENSION), BENIGN: ICD-10-CM

## 2019-09-13 DIAGNOSIS — F70 MILD INTELLECTUAL DISABILITY: ICD-10-CM

## 2019-09-13 PROCEDURE — G8427 DOCREV CUR MEDS BY ELIG CLIN: HCPCS | Performed by: NURSE PRACTITIONER

## 2019-09-13 PROCEDURE — G8417 CALC BMI ABV UP PARAM F/U: HCPCS | Performed by: NURSE PRACTITIONER

## 2019-09-13 PROCEDURE — 99214 OFFICE O/P EST MOD 30 MIN: CPT | Performed by: NURSE PRACTITIONER

## 2019-09-13 PROCEDURE — 1036F TOBACCO NON-USER: CPT | Performed by: NURSE PRACTITIONER

## 2019-09-13 RX ORDER — AZITHROMYCIN 250 MG/1
250 TABLET, FILM COATED ORAL SEE ADMIN INSTRUCTIONS
Qty: 6 TABLET | Refills: 0 | Status: SHIPPED | OUTPATIENT
Start: 2019-09-13 | End: 2019-09-18

## 2019-09-13 RX ORDER — SUMATRIPTAN 20 MG/1
1 SPRAY NASAL
Qty: 1 INHALER | Refills: 1 | Status: SHIPPED | OUTPATIENT
Start: 2019-09-13 | End: 2019-10-24 | Stop reason: SDUPTHER

## 2019-09-13 RX ORDER — VILAZODONE HYDROCHLORIDE 20 MG/1
20 TABLET ORAL DAILY
Qty: 30 TABLET | Refills: 3 | Status: SHIPPED | OUTPATIENT
Start: 2019-09-13 | End: 2020-10-28

## 2019-09-13 RX ORDER — CLONIDINE HYDROCHLORIDE 0.1 MG/1
0.1 TABLET ORAL NIGHTLY
Qty: 30 TABLET | Refills: 3 | Status: SHIPPED | OUTPATIENT
Start: 2019-09-13 | End: 2019-11-27 | Stop reason: ALTCHOICE

## 2019-09-13 ASSESSMENT — PATIENT HEALTH QUESTIONNAIRE - PHQ9
9. THOUGHTS THAT YOU WOULD BE BETTER OFF DEAD, OR OF HURTING YOURSELF: 0
2. FEELING DOWN, DEPRESSED OR HOPELESS: 2
3. TROUBLE FALLING OR STAYING ASLEEP: 3
6. FEELING BAD ABOUT YOURSELF - OR THAT YOU ARE A FAILURE OR HAVE LET YOURSELF OR YOUR FAMILY DOWN: 0
SUM OF ALL RESPONSES TO PHQ QUESTIONS 1-9: 11
5. POOR APPETITE OR OVEREATING: 0
8. MOVING OR SPEAKING SO SLOWLY THAT OTHER PEOPLE COULD HAVE NOTICED. OR THE OPPOSITE, BEING SO FIGETY OR RESTLESS THAT YOU HAVE BEEN MOVING AROUND A LOT MORE THAN USUAL: 0
SUM OF ALL RESPONSES TO PHQ QUESTIONS 1-9: 11
1. LITTLE INTEREST OR PLEASURE IN DOING THINGS: 2
10. IF YOU CHECKED OFF ANY PROBLEMS, HOW DIFFICULT HAVE THESE PROBLEMS MADE IT FOR YOU TO DO YOUR WORK, TAKE CARE OF THINGS AT HOME, OR GET ALONG WITH OTHER PEOPLE: 0
7. TROUBLE CONCENTRATING ON THINGS, SUCH AS READING THE NEWSPAPER OR WATCHING TELEVISION: 2
4. FEELING TIRED OR HAVING LITTLE ENERGY: 2
SUM OF ALL RESPONSES TO PHQ9 QUESTIONS 1 & 2: 4

## 2019-09-13 ASSESSMENT — ENCOUNTER SYMPTOMS
NAUSEA: 0
DIARRHEA: 0
VOMITING: 0
SORE THROAT: 0
SINUS PAIN: 1
VOICE CHANGE: 0
TROUBLE SWALLOWING: 0
COUGH: 1
SINUS PRESSURE: 1
FACIAL SWELLING: 0

## 2019-09-13 ASSESSMENT — ANXIETY QUESTIONNAIRES
5. BEING SO RESTLESS THAT IT IS HARD TO SIT STILL: 1-SEVERAL DAYS
GAD7 TOTAL SCORE: 15
2. NOT BEING ABLE TO STOP OR CONTROL WORRYING: 3-NEARLY EVERY DAY
4. TROUBLE RELAXING: 3-NEARLY EVERY DAY
3. WORRYING TOO MUCH ABOUT DIFFERENT THINGS: 3-NEARLY EVERY DAY
6. BECOMING EASILY ANNOYED OR IRRITABLE: 3-NEARLY EVERY DAY
7. FEELING AFRAID AS IF SOMETHING AWFUL MIGHT HAPPEN: 0-NOT AT ALL SURE
1. FEELING NERVOUS, ANXIOUS, OR ON EDGE: 2-OVER HALF THE DAYS
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
## 2019-09-30 ENCOUNTER — OFFICE VISIT (OUTPATIENT)
Dept: ENT CLINIC | Age: 39
End: 2019-09-30
Payer: MEDICARE

## 2019-09-30 VITALS
WEIGHT: 257.5 LBS | HEART RATE: 96 BPM | DIASTOLIC BLOOD PRESSURE: 79 MMHG | HEIGHT: 62 IN | BODY MASS INDEX: 47.39 KG/M2 | SYSTOLIC BLOOD PRESSURE: 124 MMHG

## 2019-09-30 DIAGNOSIS — H92.03 OTALGIA OF BOTH EARS: ICD-10-CM

## 2019-09-30 DIAGNOSIS — H93.13 SUBJECTIVE TINNITUS OF BOTH EARS: ICD-10-CM

## 2019-09-30 DIAGNOSIS — H92.13 OTORRHEA OF BOTH EARS: Primary | ICD-10-CM

## 2019-09-30 PROCEDURE — 99213 OFFICE O/P EST LOW 20 MIN: CPT | Performed by: OTOLARYNGOLOGY

## 2019-09-30 PROCEDURE — 1036F TOBACCO NON-USER: CPT | Performed by: OTOLARYNGOLOGY

## 2019-09-30 PROCEDURE — G8427 DOCREV CUR MEDS BY ELIG CLIN: HCPCS | Performed by: OTOLARYNGOLOGY

## 2019-09-30 PROCEDURE — G8417 CALC BMI ABV UP PARAM F/U: HCPCS | Performed by: OTOLARYNGOLOGY

## 2019-10-02 DIAGNOSIS — I10 HTN (HYPERTENSION), BENIGN: ICD-10-CM

## 2019-10-02 RX ORDER — LISINOPRIL AND HYDROCHLOROTHIAZIDE 12.5; 1 MG/1; MG/1
2 TABLET ORAL DAILY
Qty: 60 TABLET | Refills: 5 | Status: SHIPPED | OUTPATIENT
Start: 2019-10-02 | End: 2020-11-04

## 2019-10-14 ENCOUNTER — NURSE TRIAGE (OUTPATIENT)
Dept: OTHER | Facility: CLINIC | Age: 39
End: 2019-10-14

## 2019-10-14 ENCOUNTER — HOSPITAL ENCOUNTER (EMERGENCY)
Age: 39
Discharge: HOME | End: 2019-10-14
Attending: EMERGENCY MEDICINE
Payer: MEDICARE

## 2019-10-14 VITALS
HEART RATE: 98 BPM | WEIGHT: 257 LBS | TEMPERATURE: 97.8 F | BODY MASS INDEX: 48.52 KG/M2 | DIASTOLIC BLOOD PRESSURE: 63 MMHG | HEIGHT: 61 IN | OXYGEN SATURATION: 98 % | SYSTOLIC BLOOD PRESSURE: 125 MMHG | RESPIRATION RATE: 17 BRPM

## 2019-10-14 DIAGNOSIS — R07.89 CHEST WALL PAIN: Primary | ICD-10-CM

## 2019-10-14 LAB
A/G RATIO: 1.1 (ref 1.1–2.2)
ALBUMIN SERPL-MCNC: 4.4 G/DL (ref 3.4–5)
ALP BLD-CCNC: 95 U/L (ref 40–129)
ALT SERPL-CCNC: 23 U/L (ref 10–40)
ANION GAP SERPL CALCULATED.3IONS-SCNC: 13 MMOL/L (ref 3–16)
AST SERPL-CCNC: 26 U/L (ref 15–37)
BASOPHILS ABSOLUTE: 0 K/UL (ref 0–0.2)
BASOPHILS RELATIVE PERCENT: 0.2 %
BILIRUB SERPL-MCNC: 0.3 MG/DL (ref 0–1)
BUN BLDV-MCNC: 15 MG/DL (ref 7–20)
CALCIUM SERPL-MCNC: 10 MG/DL (ref 8.3–10.6)
CHLORIDE BLD-SCNC: 94 MMOL/L (ref 99–110)
CO2: 26 MMOL/L (ref 21–32)
CREAT SERPL-MCNC: 0.8 MG/DL (ref 0.6–1.1)
D DIMER: <200 NG/ML DDU (ref 0–229)
EOSINOPHILS ABSOLUTE: 0.1 K/UL (ref 0–0.6)
EOSINOPHILS RELATIVE PERCENT: 1.4 %
GFR AFRICAN AMERICAN: >60
GFR NON-AFRICAN AMERICAN: >60
GLOBULIN: 4.1 G/DL
GLUCOSE BLD-MCNC: 140 MG/DL (ref 70–99)
HCT VFR BLD CALC: 36.2 % (ref 36–48)
HEMOGLOBIN: 11.8 G/DL (ref 12–16)
LYMPHOCYTES ABSOLUTE: 2.6 K/UL (ref 1–5.1)
LYMPHOCYTES RELATIVE PERCENT: 25.4 %
MCH RBC QN AUTO: 27.7 PG (ref 26–34)
MCHC RBC AUTO-ENTMCNC: 32.6 G/DL (ref 31–36)
MCV RBC AUTO: 85.1 FL (ref 80–100)
MONOCYTES ABSOLUTE: 0.7 K/UL (ref 0–1.3)
MONOCYTES RELATIVE PERCENT: 6.9 %
NEUTROPHILS ABSOLUTE: 6.7 K/UL (ref 1.7–7.7)
NEUTROPHILS RELATIVE PERCENT: 66.1 %
PDW BLD-RTO: 15.7 % (ref 12.4–15.4)
PLATELET # BLD: 297 K/UL (ref 135–450)
PMV BLD AUTO: 6.9 FL (ref 5–10.5)
POTASSIUM REFLEX MAGNESIUM: 3.7 MMOL/L (ref 3.5–5.1)
RBC # BLD: 4.26 M/UL (ref 4–5.2)
SODIUM BLD-SCNC: 133 MMOL/L (ref 136–145)
TOTAL PROTEIN: 8.5 G/DL (ref 6.4–8.2)
TROPONIN: <0.01 NG/ML
WBC # BLD: 10.1 K/UL (ref 4–11)

## 2019-10-14 PROCEDURE — 2580000003 HC RX 258: Performed by: PHYSICIAN ASSISTANT

## 2019-10-14 PROCEDURE — 6360000002 HC RX W HCPCS: Performed by: PHYSICIAN ASSISTANT

## 2019-10-14 PROCEDURE — 84484 ASSAY OF TROPONIN QUANT: CPT

## 2019-10-14 PROCEDURE — 85025 COMPLETE CBC W/AUTO DIFF WBC: CPT

## 2019-10-14 PROCEDURE — 96361 HYDRATE IV INFUSION ADD-ON: CPT

## 2019-10-14 PROCEDURE — 85379 FIBRIN DEGRADATION QUANT: CPT

## 2019-10-14 PROCEDURE — 96374 THER/PROPH/DIAG INJ IV PUSH: CPT

## 2019-10-14 PROCEDURE — 80053 COMPREHEN METABOLIC PANEL: CPT

## 2019-10-14 PROCEDURE — 93005 ELECTROCARDIOGRAM TRACING: CPT | Performed by: EMERGENCY MEDICINE

## 2019-10-14 PROCEDURE — 99285 EMERGENCY DEPT VISIT HI MDM: CPT

## 2019-10-14 RX ORDER — KETOROLAC TROMETHAMINE 30 MG/ML
30 INJECTION, SOLUTION INTRAMUSCULAR; INTRAVENOUS ONCE
Status: COMPLETED | OUTPATIENT
Start: 2019-10-14 | End: 2019-10-14

## 2019-10-14 RX ORDER — 0.9 % SODIUM CHLORIDE 0.9 %
1000 INTRAVENOUS SOLUTION INTRAVENOUS ONCE
Status: COMPLETED | OUTPATIENT
Start: 2019-10-14 | End: 2019-10-14

## 2019-10-14 RX ADMIN — SODIUM CHLORIDE 1000 ML: 9 INJECTION, SOLUTION INTRAVENOUS at 19:41

## 2019-10-14 RX ADMIN — KETOROLAC TROMETHAMINE 30 MG: 30 INJECTION, SOLUTION INTRAMUSCULAR at 19:41

## 2019-10-14 ASSESSMENT — ENCOUNTER SYMPTOMS
COUGH: 1
NAUSEA: 0
VOMITING: 0
ABDOMINAL PAIN: 0
SHORTNESS OF BREATH: 1
DIARRHEA: 0
WHEEZING: 0

## 2019-10-14 ASSESSMENT — PAIN SCALES - GENERAL
PAINLEVEL_OUTOF10: 8
PAINLEVEL_OUTOF10: 3
PAINLEVEL_OUTOF10: 8

## 2019-10-14 ASSESSMENT — PAIN DESCRIPTION - PAIN TYPE: TYPE: ACUTE PAIN

## 2019-10-14 ASSESSMENT — HEART SCORE: ECG: 0

## 2019-10-14 ASSESSMENT — PAIN DESCRIPTION - LOCATION: LOCATION: CHEST

## 2019-10-15 LAB
EKG ATRIAL RATE: 109 BPM
EKG DIAGNOSIS: NORMAL
EKG P AXIS: 49 DEGREES
EKG P-R INTERVAL: 132 MS
EKG Q-T INTERVAL: 366 MS
EKG QRS DURATION: 80 MS
EKG QTC CALCULATION (BAZETT): 492 MS
EKG R AXIS: 0 DEGREES
EKG T AXIS: 13 DEGREES
EKG VENTRICULAR RATE: 109 BPM

## 2019-10-15 PROCEDURE — 93010 ELECTROCARDIOGRAM REPORT: CPT | Performed by: INTERNAL MEDICINE

## 2019-10-17 ENCOUNTER — OFFICE VISIT (OUTPATIENT)
Dept: INTERNAL MEDICINE CLINIC | Age: 39
End: 2019-10-17
Payer: MEDICARE

## 2019-10-17 VITALS
SYSTOLIC BLOOD PRESSURE: 126 MMHG | BODY MASS INDEX: 47.77 KG/M2 | HEIGHT: 61 IN | DIASTOLIC BLOOD PRESSURE: 78 MMHG | OXYGEN SATURATION: 98 % | WEIGHT: 253 LBS | HEART RATE: 92 BPM

## 2019-10-17 DIAGNOSIS — F41.9 ANXIETY: ICD-10-CM

## 2019-10-17 DIAGNOSIS — Z23 NEED FOR HEPATITIS B VACCINATION: ICD-10-CM

## 2019-10-17 DIAGNOSIS — E78.2 MIXED HYPERLIPIDEMIA: ICD-10-CM

## 2019-10-17 DIAGNOSIS — R07.89 CHEST WALL PAIN: Primary | ICD-10-CM

## 2019-10-17 DIAGNOSIS — E11.9 TYPE 2 DIABETES MELLITUS WITHOUT COMPLICATION, WITHOUT LONG-TERM CURRENT USE OF INSULIN (HCC): ICD-10-CM

## 2019-10-17 DIAGNOSIS — F33.1 MODERATE EPISODE OF RECURRENT MAJOR DEPRESSIVE DISORDER (HCC): ICD-10-CM

## 2019-10-17 DIAGNOSIS — G47.33 OSA (OBSTRUCTIVE SLEEP APNEA): ICD-10-CM

## 2019-10-17 DIAGNOSIS — F31.76 BIPOLAR DISORDER, IN FULL REMISSION, MOST RECENT EPISODE DEPRESSED (HCC): ICD-10-CM

## 2019-10-17 DIAGNOSIS — I10 HTN (HYPERTENSION), BENIGN: ICD-10-CM

## 2019-10-17 DIAGNOSIS — Z23 NEED FOR STREPTOCOCCUS PNEUMONIAE VACCINATION: ICD-10-CM

## 2019-10-17 PROBLEM — R73.09 OTHER ABNORMAL GLUCOSE: Status: RESOLVED | Noted: 2019-08-23 | Resolved: 2019-10-17

## 2019-10-17 PROBLEM — R09.02 HYPOXEMIA: Status: RESOLVED | Noted: 2019-05-07 | Resolved: 2019-10-17

## 2019-10-17 PROBLEM — B35.3 TINEA PEDIS: Status: RESOLVED | Noted: 2018-04-02 | Resolved: 2019-10-17

## 2019-10-17 PROBLEM — G43.909 MIGRAINE: Status: RESOLVED | Noted: 2018-04-09 | Resolved: 2019-10-17

## 2019-10-17 PROBLEM — H92.03 OTALGIA OF BOTH EARS: Status: RESOLVED | Noted: 2019-09-30 | Resolved: 2019-10-17

## 2019-10-17 PROBLEM — R03.0 ELEVATED BLOOD PRESSURE READING WITHOUT DIAGNOSIS OF HYPERTENSION: Status: RESOLVED | Noted: 2019-08-23 | Resolved: 2019-10-17

## 2019-10-17 PROBLEM — D50.0 IRON DEFICIENCY ANEMIA DUE TO CHRONIC BLOOD LOSS: Status: RESOLVED | Noted: 2018-05-01 | Resolved: 2019-10-17

## 2019-10-17 LAB
CHOLESTEROL, TOTAL: 203 MG/DL (ref 0–199)
CREATININE URINE: 135.5 MG/DL (ref 28–259)
HDLC SERPL-MCNC: 46 MG/DL (ref 40–60)
LDL CHOLESTEROL CALCULATED: 112 MG/DL
MICROALBUMIN UR-MCNC: <1.2 MG/DL
MICROALBUMIN/CREAT UR-RTO: NORMAL MG/G (ref 0–30)
TRIGL SERPL-MCNC: 224 MG/DL (ref 0–150)
VLDLC SERPL CALC-MCNC: 45 MG/DL

## 2019-10-17 PROCEDURE — G0010 ADMIN HEPATITIS B VACCINE: HCPCS | Performed by: NURSE PRACTITIONER

## 2019-10-17 PROCEDURE — G8427 DOCREV CUR MEDS BY ELIG CLIN: HCPCS | Performed by: NURSE PRACTITIONER

## 2019-10-17 PROCEDURE — 99214 OFFICE O/P EST MOD 30 MIN: CPT | Performed by: NURSE PRACTITIONER

## 2019-10-17 PROCEDURE — G8417 CALC BMI ABV UP PARAM F/U: HCPCS | Performed by: NURSE PRACTITIONER

## 2019-10-17 PROCEDURE — G8482 FLU IMMUNIZE ORDER/ADMIN: HCPCS | Performed by: NURSE PRACTITIONER

## 2019-10-17 PROCEDURE — 1111F DSCHRG MED/CURRENT MED MERGE: CPT | Performed by: NURSE PRACTITIONER

## 2019-10-17 PROCEDURE — 3044F HG A1C LEVEL LT 7.0%: CPT | Performed by: NURSE PRACTITIONER

## 2019-10-17 PROCEDURE — G0009 ADMIN PNEUMOCOCCAL VACCINE: HCPCS | Performed by: NURSE PRACTITIONER

## 2019-10-17 PROCEDURE — 1036F TOBACCO NON-USER: CPT | Performed by: NURSE PRACTITIONER

## 2019-10-17 PROCEDURE — 2022F DILAT RTA XM EVC RTNOPTHY: CPT | Performed by: NURSE PRACTITIONER

## 2019-10-17 PROCEDURE — 90732 PPSV23 VACC 2 YRS+ SUBQ/IM: CPT | Performed by: NURSE PRACTITIONER

## 2019-10-17 PROCEDURE — 90746 HEPB VACCINE 3 DOSE ADULT IM: CPT | Performed by: NURSE PRACTITIONER

## 2019-10-18 LAB
ESTIMATED AVERAGE GLUCOSE: 142.7 MG/DL
HBA1C MFR BLD: 6.6 %

## 2019-10-21 ENCOUNTER — TELEPHONE (OUTPATIENT)
Dept: INTERNAL MEDICINE CLINIC | Age: 39
End: 2019-10-21

## 2019-10-21 DIAGNOSIS — F31.76 BIPOLAR DISORDER, IN FULL REMISSION, MOST RECENT EPISODE DEPRESSED (HCC): ICD-10-CM

## 2019-10-21 DIAGNOSIS — F41.9 ANXIETY: Primary | ICD-10-CM

## 2019-10-24 ENCOUNTER — OFFICE VISIT (OUTPATIENT)
Dept: INTERNAL MEDICINE CLINIC | Age: 39
End: 2019-10-24
Payer: MEDICARE

## 2019-10-24 VITALS
SYSTOLIC BLOOD PRESSURE: 118 MMHG | HEIGHT: 61 IN | WEIGHT: 255 LBS | BODY MASS INDEX: 48.15 KG/M2 | DIASTOLIC BLOOD PRESSURE: 82 MMHG

## 2019-10-24 DIAGNOSIS — G43.009 MIGRAINE WITHOUT AURA AND WITHOUT STATUS MIGRAINOSUS, NOT INTRACTABLE: Primary | ICD-10-CM

## 2019-10-24 PROCEDURE — 1036F TOBACCO NON-USER: CPT | Performed by: NURSE PRACTITIONER

## 2019-10-24 PROCEDURE — G8482 FLU IMMUNIZE ORDER/ADMIN: HCPCS | Performed by: NURSE PRACTITIONER

## 2019-10-24 PROCEDURE — 99213 OFFICE O/P EST LOW 20 MIN: CPT | Performed by: NURSE PRACTITIONER

## 2019-10-24 PROCEDURE — 96372 THER/PROPH/DIAG INJ SC/IM: CPT | Performed by: NURSE PRACTITIONER

## 2019-10-24 PROCEDURE — G8417 CALC BMI ABV UP PARAM F/U: HCPCS | Performed by: NURSE PRACTITIONER

## 2019-10-24 PROCEDURE — G8427 DOCREV CUR MEDS BY ELIG CLIN: HCPCS | Performed by: NURSE PRACTITIONER

## 2019-10-24 RX ORDER — KETOROLAC TROMETHAMINE 30 MG/ML
60 INJECTION, SOLUTION INTRAMUSCULAR; INTRAVENOUS ONCE
Status: COMPLETED | OUTPATIENT
Start: 2019-10-24 | End: 2019-10-24

## 2019-10-24 RX ORDER — SUMATRIPTAN 20 MG/1
1 SPRAY NASAL
Qty: 1 INHALER | Refills: 1 | Status: SHIPPED | OUTPATIENT
Start: 2019-10-24 | End: 2020-10-19

## 2019-10-24 RX ADMIN — KETOROLAC TROMETHAMINE 60 MG: 30 INJECTION, SOLUTION INTRAMUSCULAR; INTRAVENOUS at 09:40

## 2019-10-24 ASSESSMENT — ENCOUNTER SYMPTOMS
PHOTOPHOBIA: 1
SCALP TENDERNESS: 0
NAUSEA: 1
BLURRED VISION: 0

## 2019-10-25 ENCOUNTER — OFFICE VISIT (OUTPATIENT)
Dept: ORTHOPEDIC SURGERY | Age: 39
End: 2019-10-25
Payer: MEDICARE

## 2019-10-25 VITALS
WEIGHT: 255.07 LBS | HEIGHT: 61 IN | SYSTOLIC BLOOD PRESSURE: 111 MMHG | DIASTOLIC BLOOD PRESSURE: 77 MMHG | BODY MASS INDEX: 48.16 KG/M2 | HEART RATE: 93 BPM

## 2019-10-25 DIAGNOSIS — M54.16 LUMBAR RADICULOPATHY: Primary | ICD-10-CM

## 2019-10-25 DIAGNOSIS — M47.816 SPONDYLOSIS OF LUMBAR REGION WITHOUT MYELOPATHY OR RADICULOPATHY: ICD-10-CM

## 2019-10-25 DIAGNOSIS — M51.26 HNP (HERNIATED NUCLEUS PULPOSUS), LUMBAR: ICD-10-CM

## 2019-10-25 DIAGNOSIS — M51.36 DDD (DEGENERATIVE DISC DISEASE), LUMBAR: ICD-10-CM

## 2019-10-25 PROCEDURE — G8482 FLU IMMUNIZE ORDER/ADMIN: HCPCS | Performed by: PHYSICAL MEDICINE & REHABILITATION

## 2019-10-25 PROCEDURE — G8427 DOCREV CUR MEDS BY ELIG CLIN: HCPCS | Performed by: PHYSICAL MEDICINE & REHABILITATION

## 2019-10-25 PROCEDURE — 99214 OFFICE O/P EST MOD 30 MIN: CPT | Performed by: PHYSICAL MEDICINE & REHABILITATION

## 2019-10-25 PROCEDURE — 1036F TOBACCO NON-USER: CPT | Performed by: PHYSICAL MEDICINE & REHABILITATION

## 2019-10-25 PROCEDURE — G8417 CALC BMI ABV UP PARAM F/U: HCPCS | Performed by: PHYSICAL MEDICINE & REHABILITATION

## 2019-10-25 RX ORDER — GABAPENTIN 300 MG/1
300 CAPSULE ORAL NIGHTLY
Qty: 30 CAPSULE | Refills: 0 | Status: SHIPPED | OUTPATIENT
Start: 2019-10-25 | End: 2019-11-27 | Stop reason: ALTCHOICE

## 2019-10-25 RX ORDER — TIZANIDINE 4 MG/1
4 TABLET ORAL NIGHTLY
Qty: 30 TABLET | Refills: 0 | Status: SHIPPED | OUTPATIENT
Start: 2019-10-25 | End: 2019-11-24

## 2019-10-28 ENCOUNTER — TELEPHONE (OUTPATIENT)
Dept: ORTHOPEDIC SURGERY | Age: 39
End: 2019-10-28

## 2019-11-01 ENCOUNTER — HOSPITAL ENCOUNTER (OUTPATIENT)
Age: 39
Setting detail: OUTPATIENT SURGERY
Discharge: HOME | End: 2019-11-01
Attending: PHYSICAL MEDICINE & REHABILITATION | Admitting: PHYSICAL MEDICINE & REHABILITATION
Payer: MEDICARE

## 2019-11-01 ENCOUNTER — APPOINTMENT (OUTPATIENT)
Dept: GENERAL RADIOLOGY | Age: 39
End: 2019-11-01
Attending: PHYSICAL MEDICINE & REHABILITATION
Payer: MEDICARE

## 2019-11-01 VITALS
RESPIRATION RATE: 16 BRPM | BODY MASS INDEX: 47.58 KG/M2 | HEIGHT: 61 IN | DIASTOLIC BLOOD PRESSURE: 55 MMHG | TEMPERATURE: 97.5 F | OXYGEN SATURATION: 100 % | SYSTOLIC BLOOD PRESSURE: 143 MMHG | HEART RATE: 104 BPM | WEIGHT: 252 LBS

## 2019-11-01 LAB
GLUCOSE BLD-MCNC: 110 MG/DL (ref 70–99)
PERFORMED ON: ABNORMAL

## 2019-11-01 PROCEDURE — 2500000003 HC RX 250 WO HCPCS: Performed by: PHYSICAL MEDICINE & REHABILITATION

## 2019-11-01 PROCEDURE — 3209999900 FLUORO FOR SURGICAL PROCEDURES

## 2019-11-01 PROCEDURE — 3610000056 HC PAIN LEVEL 4 BASE (NON-OR): Performed by: PHYSICAL MEDICINE & REHABILITATION

## 2019-11-01 PROCEDURE — 6360000002 HC RX W HCPCS: Performed by: PHYSICAL MEDICINE & REHABILITATION

## 2019-11-01 PROCEDURE — 2709999900 HC NON-CHARGEABLE SUPPLY: Performed by: PHYSICAL MEDICINE & REHABILITATION

## 2019-11-01 PROCEDURE — 99152 MOD SED SAME PHYS/QHP 5/>YRS: CPT | Performed by: PHYSICAL MEDICINE & REHABILITATION

## 2019-11-01 RX ORDER — BETAMETHASONE SODIUM PHOSPHATE AND BETAMETHASONE ACETATE 3; 3 MG/ML; MG/ML
INJECTION, SUSPENSION INTRA-ARTICULAR; INTRALESIONAL; INTRAMUSCULAR; SOFT TISSUE
Status: COMPLETED | OUTPATIENT
Start: 2019-11-01 | End: 2019-11-01

## 2019-11-01 RX ORDER — LIDOCAINE HYDROCHLORIDE 10 MG/ML
INJECTION, SOLUTION INFILTRATION; PERINEURAL
Status: COMPLETED | OUTPATIENT
Start: 2019-11-01 | End: 2019-11-01

## 2019-11-01 RX ORDER — BUPIVACAINE HYDROCHLORIDE 5 MG/ML
INJECTION, SOLUTION EPIDURAL; INTRACAUDAL
Status: COMPLETED | OUTPATIENT
Start: 2019-11-01 | End: 2019-11-01

## 2019-11-01 RX ORDER — MIDAZOLAM HYDROCHLORIDE 1 MG/ML
INJECTION INTRAMUSCULAR; INTRAVENOUS
Status: COMPLETED | OUTPATIENT
Start: 2019-11-01 | End: 2019-11-01

## 2019-11-01 ASSESSMENT — PAIN - FUNCTIONAL ASSESSMENT: PAIN_FUNCTIONAL_ASSESSMENT: 0-10

## 2019-11-01 ASSESSMENT — PAIN SCALES - GENERAL: PAINLEVEL_OUTOF10: 2

## 2019-11-01 ASSESSMENT — PAIN DESCRIPTION - DESCRIPTORS: DESCRIPTORS: THROBBING

## 2019-11-03 DIAGNOSIS — F41.9 ANXIETY: Primary | ICD-10-CM

## 2019-11-04 ENCOUNTER — TELEPHONE (OUTPATIENT)
Dept: INTERNAL MEDICINE CLINIC | Age: 39
End: 2019-11-04

## 2019-11-04 ENCOUNTER — OFFICE VISIT (OUTPATIENT)
Dept: INTERNAL MEDICINE CLINIC | Age: 39
End: 2019-11-04
Payer: MEDICARE

## 2019-11-04 VITALS
SYSTOLIC BLOOD PRESSURE: 112 MMHG | HEART RATE: 100 BPM | WEIGHT: 253 LBS | BODY MASS INDEX: 47.77 KG/M2 | HEIGHT: 61 IN | DIASTOLIC BLOOD PRESSURE: 86 MMHG

## 2019-11-04 DIAGNOSIS — E11.9 TYPE 2 DIABETES MELLITUS WITHOUT COMPLICATION, WITHOUT LONG-TERM CURRENT USE OF INSULIN (HCC): ICD-10-CM

## 2019-11-04 DIAGNOSIS — Z00.00 ROUTINE GENERAL MEDICAL EXAMINATION AT A HEALTH CARE FACILITY: Primary | ICD-10-CM

## 2019-11-04 DIAGNOSIS — Z13.31 POSITIVE DEPRESSION SCREENING: ICD-10-CM

## 2019-11-04 PROCEDURE — G8482 FLU IMMUNIZE ORDER/ADMIN: HCPCS | Performed by: INTERNAL MEDICINE

## 2019-11-04 PROCEDURE — G8431 POS CLIN DEPRES SCRN F/U DOC: HCPCS | Performed by: INTERNAL MEDICINE

## 2019-11-04 PROCEDURE — 3044F HG A1C LEVEL LT 7.0%: CPT | Performed by: INTERNAL MEDICINE

## 2019-11-04 PROCEDURE — G0438 PPPS, INITIAL VISIT: HCPCS | Performed by: INTERNAL MEDICINE

## 2019-11-04 RX ORDER — ONDANSETRON 4 MG/1
4 TABLET, ORALLY DISINTEGRATING ORAL 3 TIMES DAILY PRN
Qty: 30 TABLET | Refills: 2 | Status: SHIPPED | OUTPATIENT
Start: 2019-11-04 | End: 2020-10-28

## 2019-11-04 RX ORDER — METFORMIN HYDROCHLORIDE 500 MG/1
500 TABLET, EXTENDED RELEASE ORAL
Qty: 30 TABLET | Refills: 0 | Status: SHIPPED | OUTPATIENT
Start: 2019-11-04 | End: 2019-11-15

## 2019-11-04 ASSESSMENT — LIFESTYLE VARIABLES: HOW OFTEN DO YOU HAVE A DRINK CONTAINING ALCOHOL: 0

## 2019-11-04 ASSESSMENT — PATIENT HEALTH QUESTIONNAIRE - PHQ9: SUM OF ALL RESPONSES TO PHQ QUESTIONS 1-9: 10

## 2019-11-05 RX ORDER — LORAZEPAM 0.5 MG/1
TABLET ORAL
Qty: 30 TABLET | Refills: 0 | Status: SHIPPED | OUTPATIENT
Start: 2019-11-05 | End: 2019-12-05

## 2019-11-06 ENCOUNTER — TELEPHONE (OUTPATIENT)
Dept: INTERNAL MEDICINE CLINIC | Age: 39
End: 2019-11-06

## 2019-11-08 ENCOUNTER — TELEPHONE (OUTPATIENT)
Dept: INTERNAL MEDICINE CLINIC | Age: 39
End: 2019-11-08

## 2019-11-11 ENCOUNTER — HOSPITAL ENCOUNTER (OUTPATIENT)
Age: 39
Setting detail: OUTPATIENT SURGERY
End: 2019-11-11
Attending: PHYSICAL MEDICINE & REHABILITATION | Admitting: PHYSICAL MEDICINE & REHABILITATION
Payer: MEDICARE

## 2019-11-12 ENCOUNTER — TELEPHONE (OUTPATIENT)
Dept: ORTHOPEDIC SURGERY | Age: 39
End: 2019-11-12

## 2019-11-13 ENCOUNTER — TELEPHONE (OUTPATIENT)
Dept: INTERNAL MEDICINE CLINIC | Age: 39
End: 2019-11-13

## 2019-11-15 ENCOUNTER — HOSPITAL ENCOUNTER (OUTPATIENT)
Dept: GENERAL RADIOLOGY | Age: 39
Discharge: HOME | End: 2019-11-15
Attending: PHYSICAL MEDICINE & REHABILITATION
Payer: MEDICARE

## 2019-11-15 ENCOUNTER — HOSPITAL ENCOUNTER (OUTPATIENT)
Age: 39
Discharge: HOME | End: 2019-11-15
Payer: MEDICARE

## 2019-11-15 ENCOUNTER — OFFICE VISIT (OUTPATIENT)
Dept: INTERNAL MEDICINE CLINIC | Age: 39
End: 2019-11-15
Payer: MEDICARE

## 2019-11-15 ENCOUNTER — TELEPHONE (OUTPATIENT)
Dept: ORTHOPEDIC SURGERY | Age: 39
End: 2019-11-15

## 2019-11-15 VITALS
WEIGHT: 255 LBS | HEIGHT: 61 IN | BODY MASS INDEX: 48.15 KG/M2 | HEART RATE: 68 BPM | TEMPERATURE: 98.4 F | SYSTOLIC BLOOD PRESSURE: 122 MMHG | DIASTOLIC BLOOD PRESSURE: 84 MMHG

## 2019-11-15 DIAGNOSIS — R35.0 URINARY FREQUENCY: ICD-10-CM

## 2019-11-15 DIAGNOSIS — R10.84 GENERALIZED ABDOMINAL DISCOMFORT: Primary | ICD-10-CM

## 2019-11-15 DIAGNOSIS — K59.09 OTHER CONSTIPATION: ICD-10-CM

## 2019-11-15 DIAGNOSIS — E11.9 TYPE 2 DIABETES MELLITUS WITHOUT COMPLICATION, WITHOUT LONG-TERM CURRENT USE OF INSULIN (HCC): ICD-10-CM

## 2019-11-15 LAB
BILIRUBIN, POC: NORMAL
BLOOD URINE, POC: NORMAL
CLARITY, POC: NORMAL
COLOR, POC: NORMAL
GLUCOSE URINE, POC: 500
KETONES, POC: NORMAL
LEUKOCYTE EST, POC: NORMAL
NITRITE, POC: NORMAL
PH, POC: 6.5
PROTEIN, POC: 30
SPECIFIC GRAVITY, POC: 1.02
UROBILINOGEN, POC: 2

## 2019-11-15 PROCEDURE — G8482 FLU IMMUNIZE ORDER/ADMIN: HCPCS | Performed by: NURSE PRACTITIONER

## 2019-11-15 PROCEDURE — 99214 OFFICE O/P EST MOD 30 MIN: CPT | Performed by: NURSE PRACTITIONER

## 2019-11-15 PROCEDURE — 81002 URINALYSIS NONAUTO W/O SCOPE: CPT | Performed by: NURSE PRACTITIONER

## 2019-11-15 PROCEDURE — 3044F HG A1C LEVEL LT 7.0%: CPT | Performed by: NURSE PRACTITIONER

## 2019-11-15 PROCEDURE — 2022F DILAT RTA XM EVC RTNOPTHY: CPT | Performed by: NURSE PRACTITIONER

## 2019-11-15 PROCEDURE — 1036F TOBACCO NON-USER: CPT | Performed by: NURSE PRACTITIONER

## 2019-11-15 PROCEDURE — 74019 RADEX ABDOMEN 2 VIEWS: CPT

## 2019-11-15 PROCEDURE — G8417 CALC BMI ABV UP PARAM F/U: HCPCS | Performed by: NURSE PRACTITIONER

## 2019-11-15 PROCEDURE — G8427 DOCREV CUR MEDS BY ELIG CLIN: HCPCS | Performed by: NURSE PRACTITIONER

## 2019-11-15 RX ORDER — GLIPIZIDE 2.5 MG/1
2.5 TABLET, EXTENDED RELEASE ORAL DAILY
Qty: 30 TABLET | Refills: 0 | Status: SHIPPED | OUTPATIENT
Start: 2019-11-15 | End: 2019-12-20

## 2019-11-15 RX ORDER — DICYCLOMINE HYDROCHLORIDE 10 MG/1
10 CAPSULE ORAL 4 TIMES DAILY PRN
Qty: 120 CAPSULE | Refills: 0 | Status: SHIPPED | OUTPATIENT
Start: 2019-11-15 | End: 2019-12-20

## 2019-11-15 ASSESSMENT — ENCOUNTER SYMPTOMS
BLOOD IN STOOL: 0
WHEEZING: 0
CHEST TIGHTNESS: 0
NAUSEA: 1
ABDOMINAL DISTENTION: 0
CONSTIPATION: 1
VOMITING: 0
ABDOMINAL PAIN: 1
SHORTNESS OF BREATH: 0
DIARRHEA: 0
RECTAL PAIN: 0

## 2019-11-16 LAB — URINE CULTURE, ROUTINE: NORMAL

## 2019-11-20 ENCOUNTER — PATIENT MESSAGE (OUTPATIENT)
Dept: INTERNAL MEDICINE CLINIC | Age: 39
End: 2019-11-20

## 2019-11-20 DIAGNOSIS — E66.01 CLASS 3 SEVERE OBESITY WITHOUT SERIOUS COMORBIDITY WITH BODY MASS INDEX (BMI) OF 45.0 TO 49.9 IN ADULT, UNSPECIFIED OBESITY TYPE (HCC): Primary | ICD-10-CM

## 2019-11-29 ENCOUNTER — PATIENT MESSAGE (OUTPATIENT)
Dept: INTERNAL MEDICINE CLINIC | Age: 39
End: 2019-11-29

## 2019-12-02 ENCOUNTER — TELEPHONE (OUTPATIENT)
Dept: INTERNAL MEDICINE CLINIC | Age: 39
End: 2019-12-02

## 2019-12-02 ENCOUNTER — PATIENT MESSAGE (OUTPATIENT)
Dept: INTERNAL MEDICINE CLINIC | Age: 39
End: 2019-12-02

## 2019-12-02 RX ORDER — LANCETS 30 GAUGE
1 EACH MISCELLANEOUS DAILY
Qty: 100 EACH | Refills: 3 | Status: SHIPPED | OUTPATIENT
Start: 2019-12-02 | End: 2020-10-28 | Stop reason: SDUPTHER

## 2019-12-02 RX ORDER — GLUCOSAMINE HCL/CHONDROITIN SU 500-400 MG
CAPSULE ORAL
Qty: 100 STRIP | Refills: 3 | Status: SHIPPED | OUTPATIENT
Start: 2019-12-02 | End: 2020-10-28 | Stop reason: SDUPTHER

## 2019-12-04 ENCOUNTER — PATIENT MESSAGE (OUTPATIENT)
Dept: INTERNAL MEDICINE CLINIC | Age: 39
End: 2019-12-04

## 2019-12-20 DIAGNOSIS — F41.9 ANXIETY: ICD-10-CM

## 2019-12-20 DIAGNOSIS — E11.9 TYPE 2 DIABETES MELLITUS WITHOUT COMPLICATION, WITHOUT LONG-TERM CURRENT USE OF INSULIN (HCC): ICD-10-CM

## 2019-12-20 RX ORDER — DICYCLOMINE HYDROCHLORIDE 10 MG/1
CAPSULE ORAL
Qty: 30 CAPSULE | Refills: 0 | Status: SHIPPED | OUTPATIENT
Start: 2019-12-20 | End: 2020-10-19

## 2019-12-20 RX ORDER — GLIPIZIDE 2.5 MG/1
TABLET, EXTENDED RELEASE ORAL
Qty: 30 TABLET | Refills: 0 | Status: SHIPPED | OUTPATIENT
Start: 2019-12-20 | End: 2020-11-04

## 2019-12-26 RX ORDER — LORAZEPAM 0.5 MG/1
TABLET ORAL
Qty: 30 TABLET | Refills: 0 | OUTPATIENT
Start: 2019-12-26 | End: 2020-01-25

## 2020-01-23 ENCOUNTER — HOSPITAL ENCOUNTER (EMERGENCY)
Age: 40
Discharge: HOME | End: 2020-01-24
Attending: EMERGENCY MEDICINE
Payer: MEDICARE

## 2020-01-23 PROCEDURE — 99283 EMERGENCY DEPT VISIT LOW MDM: CPT

## 2020-01-23 RX ORDER — LORAZEPAM 0.5 MG/1
0.5 TABLET ORAL PRN
COMMUNITY
Start: 2019-10-02 | End: 2021-12-10 | Stop reason: ALTCHOICE

## 2020-01-23 RX ORDER — BACLOFEN 10 MG/1
10-15 TABLET ORAL
COMMUNITY
Start: 2020-01-17 | End: 2020-11-04

## 2020-01-23 RX ORDER — DICLOFENAC SODIUM 75 MG/1
75 TABLET, DELAYED RELEASE ORAL
COMMUNITY
Start: 2020-01-17 | End: 2020-11-01

## 2020-01-23 RX ORDER — VILAZODONE HYDROCHLORIDE 20 MG/1
20 TABLET ORAL DAILY
COMMUNITY
End: 2020-11-04

## 2020-01-23 ASSESSMENT — PAIN SCALES - GENERAL: PAINLEVEL_OUTOF10: 10

## 2020-01-24 VITALS
BODY MASS INDEX: 47.58 KG/M2 | OXYGEN SATURATION: 97 % | RESPIRATION RATE: 16 BRPM | WEIGHT: 252 LBS | DIASTOLIC BLOOD PRESSURE: 61 MMHG | SYSTOLIC BLOOD PRESSURE: 109 MMHG | HEART RATE: 102 BPM | TEMPERATURE: 98.1 F | HEIGHT: 61 IN

## 2020-01-24 PROCEDURE — 2580000003 HC RX 258: Performed by: EMERGENCY MEDICINE

## 2020-01-24 PROCEDURE — 96375 TX/PRO/DX INJ NEW DRUG ADDON: CPT

## 2020-01-24 PROCEDURE — 96374 THER/PROPH/DIAG INJ IV PUSH: CPT

## 2020-01-24 PROCEDURE — 6360000002 HC RX W HCPCS: Performed by: EMERGENCY MEDICINE

## 2020-01-24 RX ORDER — 0.9 % SODIUM CHLORIDE 0.9 %
1000 INTRAVENOUS SOLUTION INTRAVENOUS ONCE
Status: COMPLETED | OUTPATIENT
Start: 2020-01-24 | End: 2020-01-24

## 2020-01-24 RX ORDER — KETOROLAC TROMETHAMINE 30 MG/ML
15 INJECTION, SOLUTION INTRAMUSCULAR; INTRAVENOUS ONCE
Status: COMPLETED | OUTPATIENT
Start: 2020-01-24 | End: 2020-01-24

## 2020-01-24 RX ORDER — METOCLOPRAMIDE HYDROCHLORIDE 5 MG/ML
10 INJECTION INTRAMUSCULAR; INTRAVENOUS ONCE
Status: COMPLETED | OUTPATIENT
Start: 2020-01-24 | End: 2020-01-24

## 2020-01-24 RX ORDER — DIPHENHYDRAMINE HYDROCHLORIDE 50 MG/ML
12.5 INJECTION INTRAMUSCULAR; INTRAVENOUS ONCE
Status: COMPLETED | OUTPATIENT
Start: 2020-01-24 | End: 2020-01-24

## 2020-01-24 RX ADMIN — SODIUM CHLORIDE 1000 ML: 9 INJECTION, SOLUTION INTRAVENOUS at 00:30

## 2020-01-24 RX ADMIN — KETOROLAC TROMETHAMINE 15 MG: 30 INJECTION, SOLUTION INTRAMUSCULAR at 00:30

## 2020-01-24 RX ADMIN — METOCLOPRAMIDE 10 MG: 5 INJECTION, SOLUTION INTRAMUSCULAR; INTRAVENOUS at 00:30

## 2020-01-24 RX ADMIN — DIPHENHYDRAMINE HYDROCHLORIDE 12.5 MG: 50 INJECTION, SOLUTION INTRAMUSCULAR; INTRAVENOUS at 00:30

## 2020-01-24 ASSESSMENT — PAIN SCALES - GENERAL: PAINLEVEL_OUTOF10: 10
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
## 2020-03-06 ENCOUNTER — HOSPITAL ENCOUNTER (OUTPATIENT)
Dept: MRI IMAGING | Age: 40
Discharge: HOME | End: 2020-03-06
Payer: MEDICARE

## 2020-03-06 PROCEDURE — 72141 MRI NECK SPINE W/O DYE: CPT

## 2020-06-16 RX ORDER — ALBUTEROL SULFATE 90 UG/1
AEROSOL, METERED RESPIRATORY (INHALATION)
Qty: 8.5 G | Refills: 1 | OUTPATIENT
Start: 2020-06-16

## 2020-07-07 ENCOUNTER — APPOINTMENT (OUTPATIENT)
Dept: GENERAL RADIOLOGY | Age: 40
End: 2020-07-07
Payer: MEDICARE

## 2020-07-07 VITALS
HEART RATE: 104 BPM | DIASTOLIC BLOOD PRESSURE: 95 MMHG | HEIGHT: 61 IN | OXYGEN SATURATION: 95 % | BODY MASS INDEX: 48.15 KG/M2 | RESPIRATION RATE: 18 BRPM | SYSTOLIC BLOOD PRESSURE: 153 MMHG | TEMPERATURE: 98.1 F | WEIGHT: 255 LBS

## 2020-07-07 LAB
ANION GAP SERPL CALCULATED.3IONS-SCNC: 14 MMOL/L (ref 3–16)
BASOPHILS ABSOLUTE: 0.1 K/UL (ref 0–0.2)
BASOPHILS RELATIVE PERCENT: 0.8 %
BUN BLDV-MCNC: 10 MG/DL (ref 7–20)
CALCIUM SERPL-MCNC: 9.6 MG/DL (ref 8.3–10.6)
CHLORIDE BLD-SCNC: 97 MMOL/L (ref 99–110)
CO2: 25 MMOL/L (ref 21–32)
CREAT SERPL-MCNC: 0.7 MG/DL (ref 0.6–1.1)
EOSINOPHILS ABSOLUTE: 0.2 K/UL (ref 0–0.6)
EOSINOPHILS RELATIVE PERCENT: 1.4 %
GFR AFRICAN AMERICAN: >60
GFR NON-AFRICAN AMERICAN: >60
GLUCOSE BLD-MCNC: 199 MG/DL (ref 70–99)
HCG QUALITATIVE: NEGATIVE
HCT VFR BLD CALC: 36.2 % (ref 36–48)
HEMOGLOBIN: 12.1 G/DL (ref 12–16)
LYMPHOCYTES ABSOLUTE: 2.9 K/UL (ref 1–5.1)
LYMPHOCYTES RELATIVE PERCENT: 26.2 %
MCH RBC QN AUTO: 26.3 PG (ref 26–34)
MCHC RBC AUTO-ENTMCNC: 33.3 G/DL (ref 31–36)
MCV RBC AUTO: 79 FL (ref 80–100)
MONOCYTES ABSOLUTE: 0.7 K/UL (ref 0–1.3)
MONOCYTES RELATIVE PERCENT: 6.6 %
NEUTROPHILS ABSOLUTE: 7.3 K/UL (ref 1.7–7.7)
NEUTROPHILS RELATIVE PERCENT: 65 %
PDW BLD-RTO: 16.9 % (ref 12.4–15.4)
PLATELET # BLD: 269 K/UL (ref 135–450)
PMV BLD AUTO: 7.1 FL (ref 5–10.5)
POTASSIUM SERPL-SCNC: 3.8 MMOL/L (ref 3.5–5.1)
RBC # BLD: 4.58 M/UL (ref 4–5.2)
SODIUM BLD-SCNC: 136 MMOL/L (ref 136–145)
TROPONIN: <0.01 NG/ML
WBC # BLD: 11.2 K/UL (ref 4–11)

## 2020-07-07 PROCEDURE — 93005 ELECTROCARDIOGRAM TRACING: CPT

## 2020-07-07 PROCEDURE — 85025 COMPLETE CBC W/AUTO DIFF WBC: CPT

## 2020-07-07 PROCEDURE — 84484 ASSAY OF TROPONIN QUANT: CPT

## 2020-07-07 PROCEDURE — 36415 COLL VENOUS BLD VENIPUNCTURE: CPT

## 2020-07-07 PROCEDURE — 84703 CHORIONIC GONADOTROPIN ASSAY: CPT

## 2020-07-07 PROCEDURE — 71046 X-RAY EXAM CHEST 2 VIEWS: CPT

## 2020-07-07 PROCEDURE — 80048 BASIC METABOLIC PNL TOTAL CA: CPT

## 2020-07-07 ASSESSMENT — PAIN SCALES - GENERAL: PAINLEVEL_OUTOF10: 9

## 2020-07-08 ENCOUNTER — HOSPITAL ENCOUNTER (EMERGENCY)
Age: 40
Discharge: LEFT BEFORE DISCHARGE | End: 2020-07-08
Payer: MEDICARE

## 2020-07-08 LAB
EKG ATRIAL RATE: 105 BPM
EKG DIAGNOSIS: NORMAL
EKG P AXIS: 50 DEGREES
EKG P-R INTERVAL: 130 MS
EKG Q-T INTERVAL: 368 MS
EKG QRS DURATION: 84 MS
EKG QTC CALCULATION (BAZETT): 486 MS
EKG R AXIS: 16 DEGREES
EKG T AXIS: 38 DEGREES
EKG VENTRICULAR RATE: 105 BPM

## 2020-07-08 PROCEDURE — 93010 ELECTROCARDIOGRAM REPORT: CPT | Performed by: INTERNAL MEDICINE

## 2020-07-08 PROCEDURE — 4500000002 HC ER NO CHARGE

## 2020-07-08 NOTE — ED PROVIDER NOTES
EKG reviewed myself. Dated today 2136.  105. Sinus tachycardia. No significant change from 14 October 2019.      Cyn Mccullough MD  07/07/20 2139

## 2020-08-25 ENCOUNTER — TELEPHONE (OUTPATIENT)
Dept: FAMILY MEDICINE CLINIC | Age: 40
End: 2020-08-25

## 2020-08-25 NOTE — TELEPHONE ENCOUNTER
trish calling from [employer]   Wanting to see about getting gene sight tests results.      Fax number [phone]     please advise

## 2020-09-10 ENCOUNTER — TELEPHONE (OUTPATIENT)
Dept: FAMILY MEDICINE CLINIC | Age: 40
End: 2020-09-10

## 2020-09-10 NOTE — TELEPHONE ENCOUNTER
Please notify patient that we are unable to establish her as a new patient due to [facility]'s strict no call/no show policy and history of dismissal from other [employer]. Cancel her from 9/18/20 appointment.

## 2020-09-17 ENCOUNTER — HOSPITAL ENCOUNTER (EMERGENCY)
Age: 40
Discharge: HOME | End: 2020-09-17
Payer: MEDICARE

## 2020-09-17 VITALS
DIASTOLIC BLOOD PRESSURE: 73 MMHG | HEART RATE: 98 BPM | HEIGHT: 61 IN | BODY MASS INDEX: 47.2 KG/M2 | SYSTOLIC BLOOD PRESSURE: 131 MMHG | OXYGEN SATURATION: 100 % | WEIGHT: 250 LBS | TEMPERATURE: 98.1 F | RESPIRATION RATE: 18 BRPM

## 2020-09-17 PROCEDURE — 99283 EMERGENCY DEPT VISIT LOW MDM: CPT

## 2020-09-17 PROCEDURE — 2580000003 HC RX 258: Performed by: PHYSICIAN ASSISTANT

## 2020-09-17 PROCEDURE — 6360000002 HC RX W HCPCS: Performed by: PHYSICIAN ASSISTANT

## 2020-09-17 PROCEDURE — 96375 TX/PRO/DX INJ NEW DRUG ADDON: CPT

## 2020-09-17 PROCEDURE — 96374 THER/PROPH/DIAG INJ IV PUSH: CPT

## 2020-09-17 RX ORDER — DIPHENHYDRAMINE HYDROCHLORIDE 50 MG/ML
25 INJECTION INTRAMUSCULAR; INTRAVENOUS ONCE
Status: COMPLETED | OUTPATIENT
Start: 2020-09-17 | End: 2020-09-17

## 2020-09-17 RX ORDER — KETOROLAC TROMETHAMINE 30 MG/ML
30 INJECTION, SOLUTION INTRAMUSCULAR; INTRAVENOUS ONCE
Status: COMPLETED | OUTPATIENT
Start: 2020-09-17 | End: 2020-09-17

## 2020-09-17 RX ORDER — METOCLOPRAMIDE HYDROCHLORIDE 5 MG/ML
10 INJECTION INTRAMUSCULAR; INTRAVENOUS ONCE
Status: COMPLETED | OUTPATIENT
Start: 2020-09-17 | End: 2020-09-17

## 2020-09-17 RX ORDER — 0.9 % SODIUM CHLORIDE 0.9 %
1000 INTRAVENOUS SOLUTION INTRAVENOUS ONCE
Status: COMPLETED | OUTPATIENT
Start: 2020-09-17 | End: 2020-09-17

## 2020-09-17 RX ADMIN — SODIUM CHLORIDE 1000 ML: 9 INJECTION, SOLUTION INTRAVENOUS at 17:13

## 2020-09-17 RX ADMIN — METOCLOPRAMIDE 10 MG: 5 INJECTION, SOLUTION INTRAMUSCULAR; INTRAVENOUS at 17:13

## 2020-09-17 RX ADMIN — KETOROLAC TROMETHAMINE 30 MG: 30 INJECTION, SOLUTION INTRAMUSCULAR at 17:13

## 2020-09-17 RX ADMIN — DIPHENHYDRAMINE HYDROCHLORIDE 25 MG: 50 INJECTION, SOLUTION INTRAMUSCULAR; INTRAVENOUS at 17:13

## 2020-09-17 ASSESSMENT — PAIN SCALES - GENERAL
PAINLEVEL_OUTOF10: 10
PAINLEVEL_OUTOF10: 10

## 2020-09-17 ASSESSMENT — ENCOUNTER SYMPTOMS
COUGH: 0
VOMITING: 0
DIARRHEA: 0
RHINORRHEA: 0
WHEEZING: 0
ABDOMINAL PAIN: 0
SHORTNESS OF BREATH: 0
PHOTOPHOBIA: 1
NAUSEA: 0
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
## 2020-09-17 NOTE — ED NOTES
Bed: 18  Expected date:   Expected time:   Means of arrival:   Comments:  [facility] [address], RN  09/17/20 4754

## 2020-09-17 NOTE — ED NOTES
Reviewed discharge instructions with patient. No questions at this time. No sign of distress. AOx3. Pt ambulated to ER exit with steady gait.         Johnnie Watson RN  09/17/20 2561

## 2020-10-19 ENCOUNTER — OFFICE VISIT (OUTPATIENT)
Dept: ENT CLINIC | Age: 40
End: 2020-10-19
Payer: MEDICARE

## 2020-10-19 VITALS
HEIGHT: 61 IN | TEMPERATURE: 97.5 F | WEIGHT: 264.8 LBS | HEART RATE: 84 BPM | DIASTOLIC BLOOD PRESSURE: 105 MMHG | BODY MASS INDEX: 49.99 KG/M2 | SYSTOLIC BLOOD PRESSURE: 164 MMHG

## 2020-10-19 PROCEDURE — 31231 NASAL ENDOSCOPY DX: CPT | Performed by: STUDENT IN AN ORGANIZED HEALTH CARE EDUCATION/TRAINING PROGRAM

## 2020-10-19 PROCEDURE — G8417 CALC BMI ABV UP PARAM F/U: HCPCS | Performed by: STUDENT IN AN ORGANIZED HEALTH CARE EDUCATION/TRAINING PROGRAM

## 2020-10-19 PROCEDURE — G8427 DOCREV CUR MEDS BY ELIG CLIN: HCPCS | Performed by: STUDENT IN AN ORGANIZED HEALTH CARE EDUCATION/TRAINING PROGRAM

## 2020-10-19 PROCEDURE — G8484 FLU IMMUNIZE NO ADMIN: HCPCS | Performed by: STUDENT IN AN ORGANIZED HEALTH CARE EDUCATION/TRAINING PROGRAM

## 2020-10-19 PROCEDURE — 99214 OFFICE O/P EST MOD 30 MIN: CPT | Performed by: STUDENT IN AN ORGANIZED HEALTH CARE EDUCATION/TRAINING PROGRAM

## 2020-10-19 RX ORDER — FLUTICASONE PROPIONATE 50 MCG
2 SPRAY, SUSPENSION (ML) NASAL 2 TIMES DAILY
Qty: 2 BOTTLE | Refills: 5 | Status: SHIPPED | OUTPATIENT
Start: 2020-10-19 | End: 2020-12-08

## 2020-10-19 NOTE — PATIENT INSTRUCTIONS
We will be obtaining a swallow study to evaluate for any narrowing of your esophagus    We will be referring you to physical therapy for treatment of your temporomandibular joint dysfunction    I would like you to begin fluticasone 2 sprays twice per day. I would like you to irrigate your nose 5 minutes prior to applying the nasal steroid spray. SINUS IRRIGATIONS INSTRUCTIONS    1. Use sinus irrigations at least 2 times per day (full bottle). 2. Fill the bottle with distilled water and mixed the salt bicarbonate packet. 3. An instructional video can be seen at: http://beryl-alexis.info/  4. The goal is to gently irrigate both nostrils so that allergens, viruses, bacteria and crusting can be flushed from your nose. 5. If you start having ear pain after irrigations do not squeeze the bottle as firmly.

## 2020-10-19 NOTE — PROGRESS NOTES
[place]      Patient Name: Joseph Yu Record Number:  [id number]  Primary Care Physician:  Joel Recinos MD  Date of Consultation: 10/20/2020    Chief Complaint:   Chief Complaint   Patient presents with    New Patient     ear pain, tinnitus x 4 months         HISTORY OF PRESENT ILLNESS  [place] Alexandra is a(n) 36 y.o. female who presents *today for evaluation of multiple complaints. Her first complaint is related to her ears. She states that for the past several months she has ear pain bilaterally. This is exacerbated by pressing on the area in front of her ear. Chewing will sometimes make it worse. She has never had this before. She has never been evaluated for TMJ function before. The patient also notes difficulty swallowing. She notes that she will have episodes where she will develop emesis with partially digested food coming up. This is been going on for some time and is slowly getting worse. She has not take anything for this in the past.  Nothing makes this better or worse. The patient also admits to significant difficulties breathing through her nose. She will get occasional nasal drainage that is clear. Her sense of smell is mildly decreased. She has not use any medications in her nose. She is not using any sinus irrigations. She has never had allergy testing or sinus surgery. Nothing is made her symptoms better or worse per      I independently reviewed the patients past medical history, past surgical history, and social history. They are unremarkable except as noted in the HPI and below. The patient denies any family history related to the current complaint, and they deny any family history of bleeding disorders or difficulties with anesthesia unless noted below.      Patient Active Problem List   Diagnosis    Bipolar disorder ([address]    Anxiety    Obesity    HTN (hypertension), benign    Insomnia    Mild intellectual disability    Migraine without aura and without status migrainosus, not intractable    PRISCILLA (obstructive sleep apnea)    Chronic eczematous otitis externa of both ears    Disorder of both eustachian tubes    Laryngopharyngeal reflux disease    Allergic rhinitis    Calcaneal spur    Carpal tunnel syndrome, left    Carpal tunnel syndrome, right    Cervicalgia    Cervico-occipital neuralgia    Hx of migraines    Inflamed seborrheic keratosis    Iron deficiency anemia    Irritable bowel syndrome with both constipation and diarrhea    Low back pain with right-sided sciatica    Malaise and fatigue    Menorrhagia with irregular cycle    Mild intermittent asthma with (acute) exacerbation    Mixed hyperlipidemia    Moderate episode of recurrent major depressive disorder (HCC)    Onychomycosis    Plantar fasciitis    S/P endometrial ablation    Sleep dysfunction with arousal disturbance    Snoring    Vitamin D deficiency    Otorrhea of both ears    Subjective tinnitus of both ears    Type 2 diabetes mellitus without complication, without long-term current use of insulin (HCC)     Past Surgical History:   Procedure Laterality Date    BREAST REDUCTION SURGERY  05/07    [facility]    CARPAL TUNNEL RELEASE Right 01/15/2019    ENDOMETRIAL ABLATION      LUMBAR SPINE SURGERY Bilateral 5/1/2019    BILATERAL L5 TRANSFORAMINAL EPIDURAL STEROID INJECTION WITH FLUOROSCOPY performed by Eileen Mcelroy MD at [address] [place] [address] Left 11/1/2019    LEFT L5 AND S1 LUMBAR TRANSFORAMINAL EPIDURAL STEROID INJECTION WITH FLUOROSCOPY performed by [facility]ileen Mcelroy MD at [facility]     Family History   Problem Relation Age of Onset    Diabetes Mother     High Blood Pressure Mother     Alcohol Abuse Father     Seizures Sister     Depression Sister     Diabetes Brother     No Known Problems Maternal Grandmother     No Known Problems Maternal Grandfather     No Known Problems Paternal Grandmother     No Known Problems Paternal Grandfather     Mental Illness Sister     No Known Problems Brother     Substance Abuse Brother      Social History     Tobacco Use    Smoking status: Never Smoker    Smokeless tobacco: Never Used    Tobacco comment: around 2nd hand smoke    Substance Use Topics    Alcohol use: No    Drug use: No        Admission on 07/08/2020, Discharged on 07/08/2020   Component Date Value Ref Range Status    Ventricular Rate 07/07/2020 105  BPM Final    Atrial Rate 07/07/2020 105  BPM Final    P-R Interval 07/07/2020 130  ms Final    QRS Duration 07/07/2020 84  ms Final    Q-T Interval 07/07/2020 368  ms Final    QTc Calculation (Bazett) 07/07/2020 486  ms Final    P Axis 07/07/2020 50  degrees Final    R Axis 07/07/2020 16  degrees Final    T Axis 07/07/2020 38  degrees Final    Diagnosis 07/07/2020 Sinus tachycardiaPossible Left atrial enlargementBorderline ECGConfirmed by [facility] MD, [id number] ([id number]) on 7/8/2020 3:43:12 PM   Final    Sodium 07/07/2020 136  136 - 145 mmol/L Final    Potassium 07/07/2020 3.8  3.5 - 5.1 mmol/L Final    Chloride 07/07/2020 97* 99 - 110 mmol/L Final    CO2 07/07/2020 25  21 - 32 mmol/L Final    Anion Gap 07/07/2020 14  3 - 16 Final    Glucose 07/07/2020 199* 70 - 99 mg/dL Final    BUN 07/07/2020 10  7 - 20 mg/dL Final    CREATININE 07/07/2020 0.7  0.6 - 1.1 mg/dL Final    GFR Non- 07/07/2020 >60  >60 Final    Comment: >60 mL/min/1.73m2 EGFR, calc. for ages 25 and older using the  MDRD formula (not corrected for weight), is valid for stable  renal function.  GFR  07/07/2020 >60  >60 Final    Comment: Chronic Kidney Disease: less than 60 ml/min/1.73 sq.m. Kidney Failure: less than 15 ml/min/1.73 sq.m. Results valid for patients 18 years and older.       Calcium 07/07/2020 9.6  8.3 - 10.6 mg/dL Final    Troponin 07/07/2020 <0.01  <0.01 ng/mL Final    Methodology by Troponin T    WBC needed for symptoms of irregular blood glucose. Diabetes Mellitus E11.9 12/2/19  Yes Germaine Plascencia MD   blood glucose monitor strips Test 1 times a day & as needed for symptoms of irregular blood glucose.  12/2/19  Yes Germaine Plascencia MD   Lancets MISC 1 each by Does not apply route daily Diabetes Mellitus E11.9 12/2/19  Yes Germaine Plascencia MD   Erenumab-aooe (AIMOVIG) 140 MG/ML SOAJ Inject 1 mL into the skin 11/1/19  Yes Historical Provider, MD   ondansetron (ZOFRAN-ODT) 4 MG disintegrating tablet Take 1 tablet by mouth 3 times daily as needed for Nausea or Vomiting 11/4/19  Yes Germaine Plascencia MD   lisinopril-hydrochlorothiazide [facility]) 10-12.5 MG per tablet Take 2 tablets by mouth daily 10/2/19  Yes TIARA Denny CNP   vilazodone HCl (VILAZODONE HCL) 20 MG TABS Take 1 tablet by mouth daily 9/13/19  Yes Denver Sousa, APRN - CNP   cariprazine hcl (VRAYLAR) 1.5 MG capsule Take 1 capsule by mouth daily 9/13/19  Yes Denver Sousa, APRN - CNP   albuterol sulfate  (90 Base) MCG/ACT inhaler INHALE TWO PUFFS BY MOUTH EVERY 6 HOURS AS NEEDED FOR WHEEZING OR COUGH 9/4/19  Yes TIARA Denny CNP       REVIEW OF SYSTEMS  The following systems were reviewed and revealed the following in addition to any already discussed in the HPI:    CONSTITUTIONAL: no weight loss, no fever, no night sweats, no chills  EYES: no vision changes, no blurry vision  EARS: no changes in hearing, no otalgia  NOSE: no epistaxis, no rhinorrhea  RESPIRATORY: no  Difficulty breathing, no shortness of breath  CV: no chest pain, no Peripheral vascular disease  HEME: No coagulation disorder, no Bleeding disorder  NEURO: no TIA or stroke-like symptoms  SKIN: No new rashes in the head and neck, no recent skin cancers  MOUTH: No new ulcers, no recent teeth infections  GASTROINTESTINAL: No diarrhea, stomach pain  PSYCH: No anxiety, no depression      PHYSICAL EXAM  BP (!) 164/105   Pulse 84   Temp 97.5 °F (36.4 °C)   Ht 5' 1\" (1.549 m)   Wt 264 lb 12.8 oz (120.1 kg)   BMI 50.03 kg/m²     GENERAL: No Acute Distress, Alert and Oriented, no Hoarseness, strong voice  EYES: EOMI, Anti-icteric  HENT:   Head: Normocephalic and atraumatic. Face:  Symmetric, facial nerve intact, no sinus tenderness  Right Ear: Normal external ear, normal external auditory canal, intact tympanic membrane with normal mobility and aerated middle ear  Left Ear: Normal external ear, normal external auditory canal, intact tympanic membrane with normal mobility and aerated middle ear  Mouth/Oral Cavity:  normal lips, Uvula is midline, no mucosal lesions, no trismus, normal dentition, normal salivary quality/flow  Oropharynx/Larynx:  normal oropharynx, 2+ tonsils; patient did not tolerate mirror exam due to excessive gag reflex  Nose:Normal external nasal appearance. Anterior rhinoscopy shows a deviated septum. Hypertrophy of turbinates. Normal mucosa   NECK: Normal range of motion, no thyromegaly, trachea is midline, no lymphadenopathy, no neck masses, no crepitus  CHEST: Normal respiratory effort, no retractions, breathing comfortably  SKIN: No rashes, normal appearing skin, no evidence of skin lesions/tumors  Neuro:  cranial nerve II-XII intact; normal gait  Cardio:  no edema          PROCEDURE    Nasal Endoscopy (CPT code 00883)    Preop: chronic rhinitis  Postop: Same    Verbal consent was received. After topical anesthesia and decongestion had been obtained using aerosolized 1% lidocaine and oxymetazoline, a 45 degree rigid endoscope was placed into both nares with the patient in a sitting position.  The following was observed:    Right Nasal Cavity and Paranasal Sinuses:  Polyp score = 0 (0 = no polyps, 1 = small polyps in middle meatus not reaching below the inferior border of the middle dee dee, 2 = polyps reaching below the middle border of the middle turbinate, 3= large polyps reaching the lower border of the inferior turbinate or polyps medial to the middle dee dee, 4= large polyps causing almost complete congestion/obstruction of the interior meatus)  Edema score = 1 (0 = absent, 1 = mild, 2 = severe)  Discharge score = 1 (0 = no discharge, 1 = clear thin discharge, 2 = thick purulent discharge)    Left Nasal Cavity and Paranasal Sinuses:    Polyp score = 0 (0 = no polyps, 1 = small polyps in middle meatus not reaching below the inferior border of the middle dee dee, 2 = polyps reaching below the middle border of the middle turbinate, 3= large polyps reaching the lower border of the inferior turbinate or polyps medial to the middle dee dee, 4= large polyps causing almost complete congestion/obstruction of the interior meatus)  Edema score = 1 (0 = absent, 1 = mild, 2 = severe)  Discharge score = 1 (0 = no discharge, 1 = clear thin discharge, 2 = thick purulent discharge)    Septum: intact and deviated to the left with a spur  Other:   -The inferior and middle turbinates were examined. The middle meatus, and sphenoethmoid recess was examined bilaterally.    -There is evidence of turbinate hypertrophy and significant sinonasal obstruction due to her deviated septum and turbinate hypertrophy. .   -There were no complications. Tolerated well without complication. I attest that I was present for and did the entire procedure myself. ASSESSMENT/PLAN  1. Otalgia of both ears  Her otalgia I explained to her is related to her TMJ dysfunction. We discussed conservative therapies including soft diet, NSAIDs, mouthguard usage as well as warm compresses. 2. Dysphagia, unspecified type  Her dysphagia is unclear etiology, this may be related to a stricture or perhaps less likely a Zenker's diverticulum. Given her history of regurgitating partially digested food this is particularly concerning for that. I will need to order an MBS to rule out any underlying pathology.   Consideration of starting her on a PPI is another treatment option. 3. TMJ dysfunction  For her TMJ dysfunction I explained the natural history of this to the patient in detail. Based on the location of her pain this is classic for TMJ syndrome. I will refer her to [address] [place] [address] outpatient physical therapy to see if they can assist her with this going forward. [facility] Out[facility] Physical Therapy [facility]    4. Nasal obstruction    5. Nasal turbinate hypertrophy    6. Deviated nasal septum    7. Chronic rhinitis    For the patient's sinonasal complaints, I discussed with her that I will be starting her on fluticasone 2 sprays twice daily as well as twice daily sinonasal irrigations. Depending on how she responds to this we did discuss that she would be a good surgical candidate for a septoplasty and inferior turbinate reduction to improve her nasal airway breathing. I will see how she responds to conservative therapy and discuss this further follow-up            I have performed a head and neck physical exam personally or was physically present during the key or critical portions of the service. Medical Decision Making:   The following items were considered in medical decision making:  Independent review of images  Review / order clinical lab tests  Review / order radiology tests  Decision to obtain old records

## 2020-10-26 ENCOUNTER — HOSPITAL ENCOUNTER (OUTPATIENT)
Dept: SPEECH THERAPY | Age: 40
Setting detail: THERAPIES SERIES
Discharge: HOME | End: 2020-10-26
Payer: MEDICARE

## 2020-10-26 ENCOUNTER — HOSPITAL ENCOUNTER (OUTPATIENT)
Dept: GENERAL RADIOLOGY | Age: 40
Discharge: HOME | End: 2020-10-26
Payer: MEDICARE

## 2020-10-26 PROCEDURE — 74230 X-RAY XM SWLNG FUNCJ C+: CPT

## 2020-10-26 PROCEDURE — 92526 ORAL FUNCTION THERAPY: CPT

## 2020-10-26 PROCEDURE — 92611 MOTION FLUOROSCOPY/SWALLOW: CPT

## 2020-10-26 NOTE — PROCEDURES
[id number]  MODIFIED BARIUM SWALLOW EVALUATION    Patient's Name: Jason Antonio O.B: 1980  Medical Diagnosis: Dysphagia, unspecified type [R13.10]  Treatment Diagnosis: Dysphagia    Ordering MD: Dr. Noman Victoria   Radiologist: Dr. Kate Trujillo    Date of Evaluation: 10/26/2020  Type of Study: Modified Barium Swallowing Study (MBS)  Diet Prior to Study: Regular texture diet with  Thin liquids   Pain Level: Denies   Reason for referral: Pt was referred due to c/o difficulty swallowing. She reported episodes of emesis/regurgitation with eating. She attributed this to solid foods and states it will happen a few times a week. She denies any coughing of choking with intake. Impression:  Modified Barium Swallow evaluation completed on 10/26/2020. Results indicate oropharyngeal swallow to be grossly [facility]. No aspiration was viewed during the study. Oral phase was grossly WNL with adequate mastication, bolus control and clearing. Pharyngeal phase appeared grossly WNL with adequate swallow timing, functional airway protection and functional pharyngeal clearing. Pt was turned into AP view and esophageal sweep was performed. This revealed delayed esophageal emptying with some retention in the mid esophagus. Retrograde flow was viewed below the pharyngoesophageal segment opening. No reflux or regurgitation was viewed during this study. Pt may benefit from more dedicated evaluation of her esophagus if MD agreeable. Per radiologist report:  Limited AP fluoroscopy of the chest does show a moderate amount of esophageal    air present with slow passage of the swallowed bolus through the mid    esophagus.           Aspiration/Penetration Risk:  Minimal     Recommendations:    Diet Level:   Regular texture diet with thin liquids    Referral: Pt may benefit from more dedicated assessment of esophagus via barium esophagram     Strategies:  Upright 90 degrees at meals; No Straws;  Meds with puree; Small bites/sips    Treatments: Speech Therapy for dysphagia treatment not indicated at this time     Consistencies given: Thin, Mildly Thick (Nectar) Liquids, Puree, Soft solid, Solid    Oral Phase  -Adequate bolus control  -Functional mastication (timely and effective)  -Adequate oral clearing     Pharyngeal Phase  -Swallow timing was [facility]   -Adequate airway protection   -Functional pharyngeal clearing     Esophageal Phase  -Esophageal retention with retrograde flow below the pharyngoesophageal segment opening (PES)    Following Evaluation:  Results/recommendations and education given to Patient, who verbalized understanding    Timed Code Treatment:  0 minutes     Total Treatment Time:  30 minutes    Megan Gomes MA CCC-SLP #[id number]  Speech Language Pathologist

## 2020-10-28 ENCOUNTER — OFFICE VISIT (OUTPATIENT)
Dept: PRIMARY CARE CLINIC | Age: 40
End: 2020-10-28
Payer: MEDICARE

## 2020-10-28 ENCOUNTER — HOSPITAL ENCOUNTER (OUTPATIENT)
Dept: PHYSICAL THERAPY | Age: 40
Setting detail: THERAPIES SERIES
Discharge: HOME | End: 2020-10-28
Payer: MEDICARE

## 2020-10-28 VITALS
TEMPERATURE: 97.7 F | HEART RATE: 91 BPM | DIASTOLIC BLOOD PRESSURE: 96 MMHG | WEIGHT: 264 LBS | SYSTOLIC BLOOD PRESSURE: 146 MMHG | BODY MASS INDEX: 49.88 KG/M2

## 2020-10-28 DIAGNOSIS — E11.9 TYPE 2 DIABETES MELLITUS WITHOUT COMPLICATION, WITHOUT LONG-TERM CURRENT USE OF INSULIN (HCC): ICD-10-CM

## 2020-10-28 LAB
ANION GAP SERPL CALCULATED.3IONS-SCNC: 13 MMOL/L (ref 3–16)
BUN BLDV-MCNC: 11 MG/DL (ref 7–20)
CALCIUM SERPL-MCNC: 10.2 MG/DL (ref 8.3–10.6)
CHLORIDE BLD-SCNC: 97 MMOL/L (ref 99–110)
CHOLESTEROL, TOTAL: 163 MG/DL (ref 0–199)
CO2: 28 MMOL/L (ref 21–32)
CREAT SERPL-MCNC: 0.8 MG/DL (ref 0.6–1.1)
GFR AFRICAN AMERICAN: >60
GFR NON-AFRICAN AMERICAN: >60
GLUCOSE BLD-MCNC: 116 MG/DL (ref 70–99)
HBA1C MFR BLD: 7.3 %
HDLC SERPL-MCNC: 39 MG/DL (ref 40–60)
LDL CHOLESTEROL CALCULATED: 80 MG/DL
POTASSIUM SERPL-SCNC: 4.9 MMOL/L (ref 3.5–5.1)
SODIUM BLD-SCNC: 138 MMOL/L (ref 136–145)
TOTAL CK: 44 U/L (ref 26–192)
TRIGL SERPL-MCNC: 220 MG/DL (ref 0–150)
VLDLC SERPL CALC-MCNC: 44 MG/DL

## 2020-10-28 PROCEDURE — 99214 OFFICE O/P EST MOD 30 MIN: CPT | Performed by: FAMILY MEDICINE

## 2020-10-28 PROCEDURE — G8417 CALC BMI ABV UP PARAM F/U: HCPCS | Performed by: FAMILY MEDICINE

## 2020-10-28 PROCEDURE — G8484 FLU IMMUNIZE NO ADMIN: HCPCS | Performed by: FAMILY MEDICINE

## 2020-10-28 PROCEDURE — 83036 HEMOGLOBIN GLYCOSYLATED A1C: CPT | Performed by: FAMILY MEDICINE

## 2020-10-28 PROCEDURE — 2022F DILAT RTA XM EVC RTNOPTHY: CPT | Performed by: FAMILY MEDICINE

## 2020-10-28 PROCEDURE — 3051F HG A1C>EQUAL 7.0%<8.0%: CPT | Performed by: FAMILY MEDICINE

## 2020-10-28 PROCEDURE — G8427 DOCREV CUR MEDS BY ELIG CLIN: HCPCS | Performed by: FAMILY MEDICINE

## 2020-10-28 PROCEDURE — 1036F TOBACCO NON-USER: CPT | Performed by: FAMILY MEDICINE

## 2020-10-28 RX ORDER — GLUCOSAMINE HCL/CHONDROITIN SU 500-400 MG
CAPSULE ORAL
Qty: 100 STRIP | Refills: 3 | Status: SHIPPED | OUTPATIENT
Start: 2020-10-28 | End: 2021-12-14 | Stop reason: SDUPTHER

## 2020-10-28 RX ORDER — BUSPIRONE HYDROCHLORIDE 7.5 MG/1
7.5 TABLET ORAL DAILY
COMMUNITY
Start: 2020-08-25 | End: 2021-03-04

## 2020-10-28 RX ORDER — ATORVASTATIN CALCIUM 20 MG/1
20 TABLET, FILM COATED ORAL DAILY
COMMUNITY
Start: 2020-02-26 | End: 2020-12-08

## 2020-10-28 RX ORDER — RIMEGEPANT SULFATE 75 MG/75MG
75 TABLET, ORALLY DISINTEGRATING ORAL PRN
COMMUNITY

## 2020-10-28 RX ORDER — LISINOPRIL 5 MG/1
10 TABLET ORAL DAILY
Qty: 30 TABLET | Refills: 5 | Status: SHIPPED | OUTPATIENT
Start: 2020-10-28 | End: 2020-11-13 | Stop reason: SDUPTHER

## 2020-10-28 RX ORDER — LANCETS 30 GAUGE
1 EACH MISCELLANEOUS DAILY
Qty: 100 EACH | Refills: 3 | Status: SHIPPED | OUTPATIENT
Start: 2020-10-28 | End: 2021-12-14 | Stop reason: SDUPTHER

## 2020-10-28 RX ORDER — LISINOPRIL 5 MG/1
5 TABLET ORAL DAILY
COMMUNITY
Start: 2020-09-09 | End: 2020-10-28

## 2020-10-28 SDOH — ECONOMIC STABILITY: INCOME INSECURITY: HOW HARD IS IT FOR YOU TO PAY FOR THE VERY BASICS LIKE FOOD, HOUSING, MEDICAL CARE, AND HEATING?: PATIENT DECLINED

## 2020-10-28 SDOH — ECONOMIC STABILITY: FOOD INSECURITY: WITHIN THE PAST 12 MONTHS, YOU WORRIED THAT YOUR FOOD WOULD RUN OUT BEFORE YOU GOT MONEY TO BUY MORE.: PATIENT DECLINED

## 2020-10-28 SDOH — ECONOMIC STABILITY: FOOD INSECURITY: WITHIN THE PAST 12 MONTHS, THE FOOD YOU BOUGHT JUST DIDN'T LAST AND YOU DIDN'T HAVE MONEY TO GET MORE.: PATIENT DECLINED

## 2020-10-28 ASSESSMENT — ENCOUNTER SYMPTOMS: SHORTNESS OF BREATH: 1

## 2020-10-28 NOTE — PROGRESS NOTES
Physical Therapy  Cancellation/No-show Note  Patient Name:  Mike Cunningham  :  1980   Date:  10/28/2020  Cancelled visits to date: 0  No-shows to date: 1    Patient status for today's appointment patient:  []  Cancelled  []  Rescheduled appointment  [x]  No-show 10/28 for eval     Reason given by patient:  []  Patient ill  []  Conflicting appointment  []  No transportation    []  Conflict with work  []  No reason given  []  Other:     Comments:      Phone call information:   []  Phone call made today to patient at _ time at number provided:      []  Patient answered, conversation as follows:    []  Patient did not answer, message left as follows:  [x]  Phone call not made today    Electronically signed by:  Hi Waddell, [facility], DPT [id number]

## 2020-11-01 ENCOUNTER — HOSPITAL ENCOUNTER (EMERGENCY)
Age: 40
Discharge: HOME | End: 2020-11-01
Payer: MEDICARE

## 2020-11-01 ENCOUNTER — APPOINTMENT (OUTPATIENT)
Dept: GENERAL RADIOLOGY | Age: 40
End: 2020-11-01
Payer: MEDICARE

## 2020-11-01 VITALS
DIASTOLIC BLOOD PRESSURE: 83 MMHG | HEART RATE: 97 BPM | RESPIRATION RATE: 16 BRPM | BODY MASS INDEX: 50.03 KG/M2 | OXYGEN SATURATION: 97 % | HEIGHT: 61 IN | WEIGHT: 265 LBS | SYSTOLIC BLOOD PRESSURE: 143 MMHG | TEMPERATURE: 98 F

## 2020-11-01 PROCEDURE — 99283 EMERGENCY DEPT VISIT LOW MDM: CPT

## 2020-11-01 PROCEDURE — 73562 X-RAY EXAM OF KNEE 3: CPT

## 2020-11-01 RX ORDER — NAPROXEN 500 MG/1
500 TABLET ORAL 2 TIMES DAILY WITH MEALS
Qty: 30 TABLET | Refills: 0 | Status: SHIPPED | OUTPATIENT
Start: 2020-11-01 | End: 2020-12-08

## 2020-11-01 ASSESSMENT — ENCOUNTER SYMPTOMS
CONSTIPATION: 0
COUGH: 0
COLOR CHANGE: 0
NAUSEA: 0
DIARRHEA: 0
RESPIRATORY NEGATIVE: 1
VOMITING: 0
ABDOMINAL PAIN: 0
SHORTNESS OF BREATH: 0
BACK PAIN: 0

## 2020-11-01 ASSESSMENT — PAIN SCALES - GENERAL: PAINLEVEL_OUTOF10: 10

## 2020-11-01 NOTE — ED PROVIDER NOTES
[address] [facility]        Pt Name: Dk Howell  MRN: [id number]  [place] 1980  Date of evaluation: 11/1/2020  Provider: IRA Justice  PCP: Mona Durbin MD    LOYD. I have evaluated this patient. My supervising physician was available for consultation. CHIEF COMPLAINT       Chief Complaint   Patient presents with    Knee Pain     pt with c/o left knee pain ongoing for three days- no known injury- no history with this knee. HISTORY OF PRESENT ILLNESS   (Location, Timing/Onset, Context/Setting, Quality, Duration, Modifying Factors, Severity, Associated Signs and Symptoms)  Note limiting factors. Dk Howell is a 36 y.o. female with past medical show anxiety, asthma, bipolar disorder, diabetes, GERD, hypertension, migraines, obesity and sleep apnea who presents to the ED with complaint of left knee pain. Patient that she has had pain to her left knee diffusely but mostly over the posterior aspect is been ongoing for the past 3 days. Patient denies any injury or trauma. States she has some increased activity with walking and ambulation over the past couple days. Denies history of problems the left knee in the past.  Patient states she feels like the knee is swollen. Denies any ecchymosis, erythema or warmth. Denies fever chills. Denies numbness or tingling. Denies abrasion or laceration. Patient denies decreased range of motion or strength. States pains any worsened with squatting and standing from a seated position. Patient states pain is aching rated 10/10. Has tried over-the-counter anti-inflammatories with moderate improvement of symptoms. Became concerned and came to the ED for further evaluation and treatment. Nursing Notes were all reviewed and agreed with or any disagreements were addressed in the HPI.     REVIEW OF SYSTEMS    (2-9 systems for level 4, 10 or more for level 5)     Review of Systems ATORVASTATIN (LIPITOR) 20 MG TABLET    Take 20 mg by mouth daily    BACLOFEN (LIORESAL) 10 MG TABLET    Take 10-15 mg by mouth    BLOOD GLUCOSE MONITOR KIT AND SUPPLIES    Test 1 times a day & as needed for symptoms of irregular blood glucose. Diabetes Mellitus E11.9    BLOOD GLUCOSE MONITOR STRIPS    Test twice daily    BUSPIRONE (BUSPAR) 7.5 MG TABLET    Take 7.5 mg by mouth daily    CARIPRAZINE HCL (VRAYLAR) 1.5 MG CAPSULE    Take 1 capsule by mouth daily    FLUTICASONE (FLONASE) 50 MCG/ACT NASAL SPRAY    2 sprays by Nasal route 2 times daily    GLIPIZIDE (GLUCOTROL XL) 2.5 MG EXTENDED RELEASE TABLET    TAKE ONE TABLET BY MOUTH DAILY    LANCETS MISC    1 each by Does not apply route daily Diabetes Mellitus E11.9    LISINOPRIL (PRINIVIL;ZESTRIL) 5 MG TABLET    Take 2 tablets by mouth daily    LISINOPRIL-HYDROCHLOROTHIAZIDE (PRINZIDE;ZESTORETIC) 10-12.5 MG PER TABLET    Take 2 tablets by mouth daily    LORAZEPAM (ATIVAN) 0.5 MG TABLET    Take 0.5 mg by mouth.     RIMEGEPANT SULFATE (NURTEC) 75 MG TBDP    Take 75 mg by mouth    SITAGLIPTIN (JANUVIA) 100 MG TABLET    Take 1 tablet by mouth daily    VILAZODONE HCL (VIIBRYD) 20 MG TABS    Take 20 mg by mouth daily         ALLERGIES     Sertraline; Metformin; and Tape [adhesive tape]    FAMILYHISTORY       Family History   Problem Relation Age of Onset    Diabetes Mother     High Blood Pressure Mother     Alcohol Abuse Father     Seizures Sister     Depression Sister     Diabetes Brother     No Known Problems Maternal Grandmother     No Known Problems Maternal Grandfather     No Known Problems Paternal Grandmother     No Known Problems Paternal Grandfather     Mental Illness Sister     No Known Problems Brother     Substance Abuse Brother           SOCIAL HISTORY       Social History     Tobacco Use    Smoking status: Never Smoker    Smokeless tobacco: Never Used    Tobacco comment: around 2nd hand smoke    Substance Use Topics    Alcohol use: No    Drug use: No       SCREENINGS             PHYSICAL EXAM    (up to 7 for level 4, 8 or more for level 5)     ED Triage Vitals [11/01/20 1413]   BP Temp Temp Source Pulse Resp SpO2 Height Weight   (!) 143/83 98 °F (36.7 °C) Oral 97 16 97 % 5' 1\" (1.549 m) 265 lb (120.2 kg)       Physical Exam  Constitutional:       Appearance: She is well-developed. She is not diaphoretic. HENT:      Head: Normocephalic and atraumatic. Right Ear: External ear normal.      Left Ear: External ear normal.   Eyes:      General:         Right eye: No discharge. Left eye: No discharge. Neck:      Musculoskeletal: Normal range of motion and neck supple. Pulmonary:      Effort: Pulmonary effort is normal. No respiratory distress. Breath sounds: No stridor. Musculoskeletal: Normal range of motion. Comments: Upon examination patient has tender palpation of the popliteal fossa of the left knee. There is no edema no evidence of Baker's cyst clinically at this time. No ecchymosis, erythema or warmth. No abrasion or laceration. No rashes or lesions. No tenderness palpation of the medial lateral joint line. No tender palpation of the patellar tendon. No abnormal patellar glide/grind. Extensor mechanism intact. No meniscal or ligamentous instability noted. Distal neurovascular intact. Gait normal.  Full range of motion and strength. No calf pain. Negative Homans' sign. No palpable cords. Skin:     General: Skin is warm and dry. Coloration: Skin is not pale. Findings: No erythema. Neurological:      Mental Status: She is alert and oriented to person, place, and time. Psychiatric:         Behavior: Behavior normal.         DIAGNOSTIC RESULTS   LABS:    Labs Reviewed - No data to display    All other labs were within normal range or not returned as of this dictation. EKG:  All EKG's are interpreted by the Emergency Department Physician in the absence of a cardiologist.  Please see their note the end of the examination. Patient swallowed nectar consistency from a cup without penetration or aspiration. Applesauce, soft solid, and cracker consistencies were also swallowed without difficulty. Limited AP fluoroscopy of the chest does show a moderate amount of esophageal air present with slow passage of the swallowed bolus through the mid esophagus. No significant laryngeal penetration or aspiration identified. Evaluation of esophageal peristalsis with standard single contrast esophagram suggested. Please see separate speech pathology report for full discussion of findings and recommendations. PROCEDURES   Unless otherwise noted below, none     Procedures    CRITICAL CARE TIME   N/A    CONSULTS:  None      EMERGENCY DEPARTMENT COURSE and DIFFERENTIAL DIAGNOSIS/MDM:   Vitals:    Vitals:    11/01/20 1413   BP: (!) 143/83   Pulse: 97   Resp: 16   Temp: 98 °F (36.7 °C)   TempSrc: Oral   SpO2: 97%   Weight: 265 lb (120.2 kg)   Height: 5' 1\" (1.549 m)       Patient was given the following medications:  Medications - No data to display        Patient is a 71-year-old female who presents to the implant of left knee pain. Left posterior knee pain concerning for potential tendinitis versus Baker's cyst.  X-ray obtained and showed no acute abnormality. Full range of motion and strength. Distal neurovascular intact. Patient instructed to ice, elevate and rest the areas much as possible. Given anti-inflammatories for home. Follow-up with orthopedics. Return the ED for any worsening symptoms. Low suspicion for acute fracture/dislocation, septic arthritis, gout, DVT, tear occlusion, nerve involvement, vascular compromise, compartment syndrome, patellar tendon rupture or other emergent etiology at this time. FINAL IMPRESSION      1.  Acute pain of left knee          DISPOSITION/PLAN   DISPOSITION Decision To Discharge 11/01/2020 03:51:18 PM      PATIENT REFERREDTO:  Ricardo Keyes MD  [address] [facility] [address] [facility] [address]    Go to   As needed, If symptoms worsen    [facility] Emergency Department  [address] [facility]  [phone]  Go to   As needed, If symptoms worsen    Leno Brown MD  [address] [facility] [address] [place] [address]  [phone]    Schedule an appointment as soon as possible for a visit   For a Re-check in  3-5    days.       DISCHARGE MEDICATIONS:  New Prescriptions    NAPROXEN (NAPROSYN) 500 MG TABLET    Take 1 tablet by mouth 2 times daily (with meals)       DISCONTINUED MEDICATIONS:  Discontinued Medications    DICLOFENAC (VOLTAREN) 75 MG EC TABLET    Take 75 mg by mouth              (Please note that portions of this note were completed with a voice recognition program.  Efforts were made to edit the dictations but occasionally words are mis-transcribed.)    Claudette Sidle, PA (electronically signed)          IRA Bowman  11/01/20 [address] [place], [place]  11/01/20 929

## 2020-11-04 ENCOUNTER — OFFICE VISIT (OUTPATIENT)
Dept: ORTHOPEDIC SURGERY | Age: 40
End: 2020-11-04
Payer: MEDICARE

## 2020-11-04 VITALS — HEIGHT: 61 IN | BODY MASS INDEX: 50.03 KG/M2 | WEIGHT: 265 LBS | TEMPERATURE: 98.1 F | RESPIRATION RATE: 16 BRPM

## 2020-11-04 PROCEDURE — G8417 CALC BMI ABV UP PARAM F/U: HCPCS | Performed by: PHYSICIAN ASSISTANT

## 2020-11-04 PROCEDURE — G8484 FLU IMMUNIZE NO ADMIN: HCPCS | Performed by: PHYSICIAN ASSISTANT

## 2020-11-04 PROCEDURE — 99214 OFFICE O/P EST MOD 30 MIN: CPT | Performed by: PHYSICIAN ASSISTANT

## 2020-11-04 PROCEDURE — G8427 DOCREV CUR MEDS BY ELIG CLIN: HCPCS | Performed by: PHYSICIAN ASSISTANT

## 2020-11-04 PROCEDURE — 1036F TOBACCO NON-USER: CPT | Performed by: PHYSICIAN ASSISTANT

## 2020-11-04 RX ORDER — ALBUTEROL SULFATE 90 UG/1
2 AEROSOL, METERED RESPIRATORY (INHALATION) EVERY 6 HOURS PRN
COMMUNITY
Start: 2020-06-23 | End: 2022-08-26 | Stop reason: SDUPTHER

## 2020-11-04 RX ORDER — DICLOFENAC SODIUM 75 MG/1
75 TABLET, DELAYED RELEASE ORAL 2 TIMES DAILY PRN
Status: ON HOLD | COMMUNITY
Start: 2020-09-14 | End: 2020-12-11 | Stop reason: HOSPADM

## 2020-11-04 RX ORDER — BACLOFEN 10 MG/1
10-20 TABLET ORAL 3 TIMES DAILY PRN
COMMUNITY
Start: 2020-09-14 | End: 2021-03-04

## 2020-11-04 RX ORDER — IBUPROFEN 800 MG/1
800 TABLET ORAL EVERY 8 HOURS PRN
COMMUNITY
Start: 2020-02-25 | End: 2020-12-10

## 2020-11-04 RX ORDER — METHOCARBAMOL 500 MG/1
500-1000 TABLET, FILM COATED ORAL 3 TIMES DAILY PRN
COMMUNITY
End: 2020-11-30 | Stop reason: CLARIF

## 2020-11-04 RX ORDER — CETIRIZINE HYDROCHLORIDE 10 MG/1
10 CAPSULE, LIQUID FILLED ORAL NIGHTLY PRN
COMMUNITY
Start: 2020-09-18 | End: 2020-12-08

## 2020-11-04 RX ORDER — MELOXICAM 15 MG/1
15 TABLET ORAL DAILY
COMMUNITY
Start: 2020-10-12 | End: 2020-12-08

## 2020-11-04 NOTE — LETTER
CONSENT TO OPERATION  AND/OR OTHER PROCEDURE(S)          PATIENT : Nery Hurt   YOB: 1980      DATE : 11/4/20          1. I request and consent that Dr. Tony Kaye. Nhi Waite,  and/or his associates or assistants perform an operation and/or procedures on the above patient at  [place] [address], to treat the condition(s) which appear indicated by the diagnostic studies already performed. I have been told that in general terms the nature, purpose and reasonable expectations of the operation and/or procedure(s) are:     Right Carpal Tunnel Release       2. It has been explained to me by the informing physician that during the course of the operation and/or procedure(s) unforeseen conditions may be revealed that necessitate an extension of the original operation and/or procedure(s) or different operation and/or procedures than those set forth in Paragraph 1. I therefore authorize and request that my physician and/or his associates or assistants perform such operations and/or procedures as are necessary and desirable in the exercise of professional judgment. The authority granted under this Paragraph 2 shall extend to all conditions that require treatment and are known to my physician at the time the operation is commenced. 3. I have been made aware by the informing physician of certain risks and consequences that are associated with the operation and/or procedure(s) described in Paragraph 1, the reasonable alternative methods or treatment, the possible consequences, the possibility that the operation and/or procedure(s) may be unsuccessful and the possibility of complications.   I understand the reasonably known risks to be:      - Bleeding  - Infection  - Poor Healing  - Possible Damage to Nerve, Vessel, Tendon/Muscle or Bone  - Need for further Treatment/Surgery  - Stiffness  - Pain  - Residual or Recurrent Symptoms  - Anesthetic and/or Medical Risks  - We have Witness     Signature Of Patient         Date        Dimitrios Florian. Isidro                                                 Informing Physician                                           Signature of Informing Physician                              If patient is unable to sign or is a minor, complete one of the following:    (A)  Patient is a minor   years of age. (B)  Patient is unable to sign because: The undersigned represents that he or she is duly authorized to execute this consent for and on behalf of the above named patient. Witness               o  Parent  o  Guardian   o  Spouse       o  Other (specify)                                                          Patient Name: Katiuska Stoner  Patient YOB: 1980  Dr. Migdalia Duvall' Return To Work Policy  Regarding your ability to return to work after surgery or injury, Dr. Migdalia Duvall will not state that any patient is off of work or cannot work at all. He will place you on restrictions after your surgical procedure or injury. This means that you will be allowed to return to work the day after your office visit or surgery with restrictions. Depending on the details of your particular situation, Dr. Migdalia Duvall may state that you will have either light use or no use of your hand for a specific number of weeks. It is your obligation to communicate with your employer regarding your restrictions. It is your employer's decision as to whether they will accommodate your restrictions (i.e. allow you to come to work in your restricted capacity) or to not allow you to return to work under your restrictions. Dr. Migdalia Duvall does not participate in making this decision and cannot influence your employer regarding their decision. If you do not communicate your restrictions to your employer, or if you do not present to work as you are scheduled to, Dr. Migdalia Duvall will not provide an 'excuse' to explain your absence.   A doctors note, or official forms ([address], FMLA, etc.) will be filled out, upon request, to indicate your date of surgery and your restrictions as stated above. Dr. Emanuel Mcnamara' Narcotic Policy  Patients will only be prescribed narcotics after surgical procedures or significant injury. Not all procedures cause pain great enough to require Narcotics and thus, not all patients will receive prescriptions after surgical procedures or injuries. Narcotics are never prescribed for chronic conditions. Narcotics are never prescribed for use longer than one week at a time. Refills are only granted in unusual circumstances and only at Dr. Keegan May discretion. Patients who are receiving narcotic medication from another physician or who are under pain management contracts will not be given a prescription for narcotics for any reason. I have read the above policies and understand that by agreeing to proceed with treatment by Dr. Nadya Luna and his team, that I am agreeing to abide by these policies.   Patient Name:  Christina Saldivar    Patient Signature:  _____________________________    [employer] Figures Date:   11/4/20

## 2020-11-04 NOTE — PROGRESS NOTES
shows Carpal Tunnel Compression Test to be Mildly Positive on the right & Negative on the left. The patient displays mild baseline symptoms to potentially confound the exam.  The thenar musculature is not atrophied & weakened  Examination for Stenosing Tenosynovitis demonstrates no evidence of tenderness, thickening or nodularity at the A-1 pulleys of the digits bilaterally. There no palpable triggering or any finger or thumb. Impression:  Ms. Bonifacio Ames is showing evidence of persistent Carpal Tunnel Syndrome after previous treatment. She requests additional treatment at this time. Plan:  I have had a thorough discussion with Ms. Bonifacio Ames regarding the treatment options available for her worsened carpal tunnel syndrome, which is causing her significant  limitations. I have outlined for Ms. Bonifacio Ames the benefits and consequences of the various treatment modalities, including the fact that surgical treatment is the only modality which is reasonably expected to provide long lasting or permanent resolution of her symptoms. Based upon our current discussion and a reasonable understating of the options available to her, Ms. Bonifacio Ames has selected to proceed with surgical Carpal Tunnel Release. I have discussed the details of the surgical procedure, the pre, alfred and postoperative concerns and the appropriate expectations after surgery with Ms. Bonifacio Ames today. She was given the opportunity to ask questions, voiced an understanding of the procedure, and she did wish to proceed with Right Carpal Tunnel Release. I had an extensive discussion with Ms. Bonifacio Ames (and any family members present with her today) regarding the natural history, etiology, and long term consequences of this problem. We have mutually selected a surgical treatment plan  and, in my opinion, surgical intervention is indicated at this time.  I have discussed with her the potential complications, limitations, expectations, alternatives, and risks of Carpal Tunnel Release. She has had full opportunity to ask her questions. I have answered them all to her satisfaction. I feel that Ms. Baldev Kelsey (and any family members present with her today) do understand our discussion today and she has provided informed consent for Right Carpal Tunnel Release. I have also discussed with Ms. Baldev Kelsey the other treatment options available to her for this condition. We have today selected to proceed with Surgical treatment. She has voiced and  understanding that there are other less aggressive treatment options which are available in this situation, albeit possibly less efficacious or durable, and she is comfortable with the plan that she has chosen. Ms. Baldev Kelsey has been given a full verbal list of instructions and precautions related to her present condition. I have asked her to followup with me in the office at the prescribed time. She is also specifically requested to call or return to the office sooner if her symptoms change or worsen prior to the next scheduled appointment.

## 2020-11-04 NOTE — PATIENT INSTRUCTIONS
Pre-Operative Instructions    1. The night before your surgery, unless otherwise instructed, do not eat any food, drink any liquids, chew gum or mints after midnight. Abstain from alcohol for 24 hours prior to surgery. 2. You will be contacted by the Hospital the working day prior to your procedure to confirm your arrival time. 3. Patients under 25years of age must have a parent or legal guardian present to sign their consent and discharge paperwork. 4. On the day of surgery,  you will be seen pre-operatively by an anesthesiologist.     5. If you are having hand surgery, it is recommended that nail polish and acrylic nails be removed prior to surgery if possible. 6. Please bring cases for glasses, contact lenses, hearing aids or dentures. They will likely be removed prior to surgery. 7. Wear casual, loose-fitting and comfortable clothing. Consider that you may have a large dressing to fit under your clothing after surgery. 9. Please do not bring valuables such as jewelry or large sums of cash to the hospital. Remove all body piercings before coming to the hospital. [facility] may not  wear any rings on the hand if you are having surgery on that hand, wrist or elbow. 10. Do not smoke or chew tobacco before your surgery. [address] [place] [address] and surgery facilities are smoke-free environments. Smoking is not permitted anywhere on campus. 11. Be sure to follow any additional instructions from your physician. If the above conditions are not met, your surgery may be cancelled and rescheduled for another day. Should you develop any change in your health such as fever, cough, sore throat, cold, flu, or infection, or if you have any questions regarding your Pre-admission or surgery, please contact [address] - Surgery Scheduling at [phone], Monday through Friday, 9 a.m. to 5 p.m.

## 2020-11-04 NOTE — LETTER
[address] [facility] - HAND SURGERY  Surgery Scheduling Form:      DEMOGRAPHICS:                                                                                                              .  Patient Name:  Nery Hurt  Patient :  1980   Patient SS#:  [id number]    Patient Phone:  [phone] (home)     Patient Address:  [address] [place] [id number]    PCP:  Armando Crain MD  Insurance:   Payor/Plan Subscr  Sex Relation Sub. Ins. ID Effective Group Num   1. [address] 1980 Female  [id number]                                   [address] [address]     DIAGNOSIS & PROCEDURE:                                                                                            .  Diagnosis:   right Carpal Tunnel Syndrome (354.0)    G56.01  Operation:  right Carpal Tunnel Release  [CPT: 78509]  Location:  [facility]  Surgeon:  Loralee Olszewski    SCHEDULING INFORMATION:                                                                                         .    Surgeon's Scheduling Instruction:  elective    RN Post-op Appt:  [x] Yes   [] No  Preferred Thursday:   [] Yes   [] No    Requested Date:  CXL  OR Time:  10:15 Patient Arrival Time:    8:45     OR Time Required:  15  Minutes  Anesthesia:  MAC/TIVA  Equipment:  None                                COVID    11-25  Mini C-Arm:  No   Standard C-Arm:  No  Status:  outpatient  PAT Required:  Yes  Comments:                      Tony Waite MD  20 9:12 AM    BILLING INFORMATION:                                                                                                    .    Procedure:       CPT Code Modifier  right Carpal Tunnel Release       .              Pre Operative Physician Prophylaxis Orders - SCIP Protocols      Pre-Operative Antibiotic Order:    Allergies   Allergen Reactions    Metformin Nausea And Vomiting, Nausea Only, Rash and Shortness Of Breath     Rash & nausea  Sertraline Anaphylaxis and Rash    Fluoxetine Other (See Comments)     Hearing Voices , weird feeling     Oxycodone-Acetaminophen Itching    Tape Pinky  Tape] Rash and Other (See Comments)     redness          [x]  ----  No Antibiotic Ordered       []  ----  Give the following Antibiotic within 1 hour prior to start time:         Ancef 1 gram IV if patient is less than 200 pounds    or       Ancef 2 grams IV if patient is greater than 200 pounds    or      Vancomycin 1 gram IV (over 1 hour) if patient is allergic to           PENICILLINS or CEFALOSPORINS          SURG       12-1                                COVID     11-25                         H&P     PCP__XX__      Procedure: right Carpal Tunnel Release      Patient: Shruthi Amato  :    1980    Physician Signature:     Date: 20  Time: 9:12 AM

## 2020-11-05 ENCOUNTER — TELEPHONE (OUTPATIENT)
Dept: ORTHOPEDIC SURGERY | Age: 40
End: 2020-11-05

## 2020-11-05 ENCOUNTER — OFFICE VISIT (OUTPATIENT)
Dept: ORTHOPEDIC SURGERY | Age: 40
End: 2020-11-05
Payer: MEDICARE

## 2020-11-05 VITALS — WEIGHT: 265 LBS | HEIGHT: 61 IN | BODY MASS INDEX: 50.03 KG/M2 | TEMPERATURE: 98 F

## 2020-11-05 PROCEDURE — G8417 CALC BMI ABV UP PARAM F/U: HCPCS | Performed by: PHYSICIAN ASSISTANT

## 2020-11-05 PROCEDURE — G8484 FLU IMMUNIZE NO ADMIN: HCPCS | Performed by: PHYSICIAN ASSISTANT

## 2020-11-05 PROCEDURE — 1036F TOBACCO NON-USER: CPT | Performed by: PHYSICIAN ASSISTANT

## 2020-11-05 PROCEDURE — 99213 OFFICE O/P EST LOW 20 MIN: CPT | Performed by: PHYSICIAN ASSISTANT

## 2020-11-05 PROCEDURE — G8427 DOCREV CUR MEDS BY ELIG CLIN: HCPCS | Performed by: PHYSICIAN ASSISTANT

## 2020-11-05 NOTE — TELEPHONE ENCOUNTER
CPT: 81928  AUTHORIZATION: NPR  BODY PART: right wrist    Per website, [address] [place] [address] Rd   #[id number]
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
## 2020-11-13 RX ORDER — LISINOPRIL 5 MG/1
10 TABLET ORAL DAILY
Qty: 30 TABLET | Refills: 5 | Status: SHIPPED | OUTPATIENT
Start: 2020-11-13 | End: 2020-11-18

## 2020-11-16 ENCOUNTER — OFFICE VISIT (OUTPATIENT)
Dept: ENT CLINIC | Age: 40
End: 2020-11-16
Payer: MEDICARE

## 2020-11-16 VITALS — HEART RATE: 114 BPM | TEMPERATURE: 98 F | DIASTOLIC BLOOD PRESSURE: 88 MMHG | SYSTOLIC BLOOD PRESSURE: 137 MMHG

## 2020-11-16 PROCEDURE — 1036F TOBACCO NON-USER: CPT | Performed by: STUDENT IN AN ORGANIZED HEALTH CARE EDUCATION/TRAINING PROGRAM

## 2020-11-16 PROCEDURE — 31231 NASAL ENDOSCOPY DX: CPT | Performed by: STUDENT IN AN ORGANIZED HEALTH CARE EDUCATION/TRAINING PROGRAM

## 2020-11-16 PROCEDURE — 99213 OFFICE O/P EST LOW 20 MIN: CPT | Performed by: STUDENT IN AN ORGANIZED HEALTH CARE EDUCATION/TRAINING PROGRAM

## 2020-11-16 PROCEDURE — G8417 CALC BMI ABV UP PARAM F/U: HCPCS | Performed by: STUDENT IN AN ORGANIZED HEALTH CARE EDUCATION/TRAINING PROGRAM

## 2020-11-16 PROCEDURE — G8484 FLU IMMUNIZE NO ADMIN: HCPCS | Performed by: STUDENT IN AN ORGANIZED HEALTH CARE EDUCATION/TRAINING PROGRAM

## 2020-11-16 PROCEDURE — G8427 DOCREV CUR MEDS BY ELIG CLIN: HCPCS | Performed by: STUDENT IN AN ORGANIZED HEALTH CARE EDUCATION/TRAINING PROGRAM

## 2020-11-16 RX ORDER — AZELASTINE 1 MG/ML
2 SPRAY, METERED NASAL 2 TIMES DAILY
Qty: 4 BOTTLE | Refills: 1 | Status: SHIPPED | OUTPATIENT
Start: 2020-11-16 | End: 2020-12-10

## 2020-11-16 NOTE — PROGRESS NOTES
Sara      Patient Name: Joseph Yu Record Number:  [id number]  Primary Care Physician:  Cris Velasquez MD  Date of Consultation: 10/20/2020    Chief Complaint:   Chief Complaint   Patient presents with    Follow-up     no improvement        HISTORY OF PRESENT ILLNESS  Neftali López is a(n) 36 y.o. female who presents *today for evaluation of multiple complaints. Her first complaint is related to her ears. She states that for the past several months she has ear pain bilaterally. This is exacerbated by pressing on the area in front of her ear. Chewing will sometimes make it worse. She has never had this before. She has never been evaluated for TMJ function before. The patient also notes difficulty swallowing. She notes that she will have episodes where she will develop emesis with partially digested food coming up. This is been going on for some time and is slowly getting worse. She has not take anything for this in the past.  Nothing makes this better or worse. The patient also admits to significant difficulties breathing through her nose. She will get occasional nasal drainage that is clear. Her sense of smell is mildly decreased. She has not use any medications in her nose. She is not using any sinus irrigations. She has never had allergy testing or sinus surgery. Nothing is made her symptoms better or worse per    Update 1/16/2020:    Patient presents today for follow-up. She is still having significant ear pain which she describes as dull and aching. She does note that this worsens when she chews. She has not tried a mouthguard. She has not tried conservative therapy for her TMJ issues. She has not talked to her dentist about this. The patient reports no changes in her difficulty swallowing. I discussed the results of her MBS with her in detail.     Regarding her sinonasal complaints, she has not had improvement on fluticasone and sinonasal irrigations. She has tried oral antihistamines in the past with no improvement. She is still getting significant nasal airway obstruction, sneezing and nasal drainage. Has not had a sinus infection    I independently reviewed the patients past medical history, past surgical history, and social history. They are unremarkable except as noted in the HPI and below. The patient denies any family history related to the current complaint, and they deny any family history of bleeding disorders or difficulties with anesthesia unless noted below.      Patient Active Problem List   Diagnosis    Bipolar disorder ([address]    Anxiety    Obesity    HTN (hypertension), benign    Insomnia    Mild intellectual disability    Migraine without aura and without status migrainosus, not intractable    PRISCILLA (obstructive sleep apnea)    Chronic eczematous otitis externa of both ears    Disorder of both eustachian tubes    Laryngopharyngeal reflux disease    Allergic rhinitis    Calcaneal spur    Carpal tunnel syndrome, left    Carpal tunnel syndrome, right    Cervicalgia    Cervico-occipital neuralgia    Hx of migraines    Inflamed seborrheic keratosis    Iron deficiency anemia    Irritable bowel syndrome with both constipation and diarrhea    Low back pain with right-sided sciatica    Malaise and fatigue    Menorrhagia with irregular cycle    Mild intermittent asthma with (acute) exacerbation    Mixed hyperlipidemia    Moderate episode of recurrent major depressive disorder (HCC)    Onychomycosis    Plantar fasciitis    S/P endometrial ablation    Sleep dysfunction with arousal disturbance    Snoring    Vitamin D deficiency    Otorrhea of both ears    Subjective tinnitus of both ears    Type 2 diabetes mellitus without complication, without long-term current use of insulin ([address]     Past Surgical History:   Procedure Laterality Date    BREAST REDUCTION SURGERY  05/07 stomach pain  PSYCH: No anxiety, no depression      PHYSICAL EXAM  /88 (Site: Left Upper Arm, Position: Sitting)   Pulse 114   Temp 98 °F (36.7 °C) (Oral)     GENERAL: No Acute Distress, Alert and Oriented, no Hoarseness, strong voice  EYES: EOMI, Anti-icteric  HENT:   Head: Normocephalic and atraumatic. Face:  Symmetric, facial nerve intact, no sinus tenderness  Right Ear: Normal external ear, normal external auditory canal, intact tympanic membrane with normal mobility and aerated middle ear  Left Ear: Normal external ear, normal external auditory canal, intact tympanic membrane with normal mobility and aerated middle ear  Mouth/Oral Cavity:  normal lips, Uvula is midline, no mucosal lesions, no trismus, normal dentition, normal salivary quality/flow  Oropharynx/Larynx:  normal oropharynx, 2+ tonsils; patient did not tolerate mirror exam due to excessive gag reflex  Nose:Normal external nasal appearance. Anterior rhinoscopy shows a deviated septum. Hypertrophy of turbinates. Normal mucosa   NECK: Normal range of motion, no thyromegaly, trachea is midline, no lymphadenopathy, no neck masses, no crepitus  CHEST: Normal respiratory effort, no retractions, breathing comfortably  SKIN: No rashes, normal appearing skin, no evidence of skin lesions/tumors  Neuro:  cranial nerve II-XII intact; normal gait  Cardio:  no edema          PROCEDURE    Nasal Endoscopy (CPT code 33413)    Preop: chronic rhinitis  Postop: Same    Verbal consent was received. After topical anesthesia and decongestion had been obtained using aerosolized 1% lidocaine and oxymetazoline, a 45 degree rigid endoscope was placed into both nares with the patient in a sitting position.  The following was observed:    Right Nasal Cavity and Paranasal Sinuses:  Polyp score = 0 (0 = no polyps, 1 = small polyps in middle meatus not reaching below the inferior border of the middle dee dee, 2 = polyps reaching below the middle border of the middle turbinate, 3= large polyps reaching the lower border of the inferior turbinate or polyps medial to the middle dee dee, 4= large polyps causing almost complete congestion/obstruction of the interior meatus)  Edema score = 1 (0 = absent, 1 = mild, 2 = severe)  Discharge score = 1 (0 = no discharge, 1 = clear thin discharge, 2 = thick purulent discharge)    Left Nasal Cavity and Paranasal Sinuses:    Polyp score = 0 (0 = no polyps, 1 = small polyps in middle meatus not reaching below the inferior border of the middle dee dee, 2 = polyps reaching below the middle border of the middle turbinate, 3= large polyps reaching the lower border of the inferior turbinate or polyps medial to the middle dee dee, 4= large polyps causing almost complete congestion/obstruction of the interior meatus)  Edema score = 1 (0 = absent, 1 = mild, 2 = severe)  Discharge score = 1 (0 = no discharge, 1 = clear thin discharge, 2 = thick purulent discharge)    Septum: intact and deviated to the left with a spur  Other:   -The inferior and middle turbinates were examined. The middle meatus, and sphenoethmoid recess was examined bilaterally.    -There is evidence of turbinate hypertrophy and significant sinonasal obstruction due to her deviated septum and turbinate hypertrophy. Overall this is improved from her prior visit. Her nasal airway was more widely patent.  -There were no complications. Tolerated well without complication. I attest that I was present for and did the entire procedure myself. ASSESSMENT/PLAN  1. Otalgia of both ears  Her otalgia I explained to her is related to her TMJ dysfunction. We discussed conservative therapies including soft diet, NSAIDs, mouthguard usage as well as warm compresses. I also discussed with her that she could discuss this further with her dentist.    2. Dysphagia, unspecified type  Her dysphagia appears to be related to delayed esophageal transit according to her MBS.   I have placed a referral to gastroenterology to further address this. 3. TMJ dysfunction  For her TMJ dysfunction I explained the natural history of this to the patient in detail. Based on the location of her pain this is classic for TMJ syndrome. I will refer her to [facility] outpatient physical therapy to see if they can assist her with this going forward. [facility] Outpatient Physical Therapy [facility]    4. Nasal obstruction    5. Nasal turbinate hypertrophy    6. Deviated nasal septum    7. Chronic rhinitis    For the patient's sinonasal complaints, she is still significantly symptomatic with sneezing and nasal airway obstruction. I have asked her to continue her nasal saline irrigations and fluticasone 2 sprays daily, and I have also prescribed her azelastine 2 sprays daily. I have also referred her to our allergy colleagues for skin prick testing. I will see her back in 1 to 2 months to go over the results and monitor response to treatment. Depending on how she was respond to this, she would be a good candidate for septoplasty and inferior turbinate reduction to address her nasal airway obstruction if she is still significantly obstructed at her follow-up    I have performed a head and neck physical exam personally or was physically present during the key or critical portions of the service. Medical Decision Making:   The following items were considered in medical decision making:  Independent review of images  Review / order clinical lab tests  Review / order radiology tests  Decision to obtain old records

## 2020-11-18 ENCOUNTER — NURSE TRIAGE (OUTPATIENT)
Dept: OTHER | Facility: CLINIC | Age: 40
End: 2020-11-18

## 2020-11-18 ENCOUNTER — OFFICE VISIT (OUTPATIENT)
Dept: PRIMARY CARE CLINIC | Age: 40
End: 2020-11-18
Payer: MEDICARE

## 2020-11-18 VITALS
SYSTOLIC BLOOD PRESSURE: 138 MMHG | OXYGEN SATURATION: 99 % | TEMPERATURE: 97.7 F | BODY MASS INDEX: 49.69 KG/M2 | DIASTOLIC BLOOD PRESSURE: 94 MMHG | HEART RATE: 94 BPM | WEIGHT: 263 LBS

## 2020-11-18 PROCEDURE — 1036F TOBACCO NON-USER: CPT | Performed by: FAMILY MEDICINE

## 2020-11-18 PROCEDURE — 99214 OFFICE O/P EST MOD 30 MIN: CPT | Performed by: FAMILY MEDICINE

## 2020-11-18 PROCEDURE — G8417 CALC BMI ABV UP PARAM F/U: HCPCS | Performed by: FAMILY MEDICINE

## 2020-11-18 PROCEDURE — 2022F DILAT RTA XM EVC RTNOPTHY: CPT | Performed by: FAMILY MEDICINE

## 2020-11-18 PROCEDURE — G8484 FLU IMMUNIZE NO ADMIN: HCPCS | Performed by: FAMILY MEDICINE

## 2020-11-18 PROCEDURE — 3051F HG A1C>EQUAL 7.0%<8.0%: CPT | Performed by: FAMILY MEDICINE

## 2020-11-18 PROCEDURE — G8427 DOCREV CUR MEDS BY ELIG CLIN: HCPCS | Performed by: FAMILY MEDICINE

## 2020-11-18 RX ORDER — LISINOPRIL AND HYDROCHLOROTHIAZIDE 20; 12.5 MG/1; MG/1
1 TABLET ORAL DAILY
Qty: 30 TABLET | Refills: 5 | Status: SHIPPED | OUTPATIENT
Start: 2020-11-18 | End: 2020-11-30

## 2020-11-18 NOTE — TELEPHONE ENCOUNTER
Reason for Disposition   Systolic BP >= 686 OR Diastolic >= 80, and is taking BP medications    Answer Assessment - Initial Assessment Questions  1. BLOOD PRESSURE: \"What is the blood pressure? \" \"Did you take at least two measurements 5 minutes apart?\"      173/94 111 then 143/74 with HR 88  2. ONSET: \"When did you take your blood pressure? \"      Now   3. HOW: \"How did you obtain the blood pressure? \" (e.g., visiting nurse, automatic home BP monitor)      Automatic cuff   4. HISTORY: \"Do you have a history of high blood pressure? \"      Yes on meds   5. MEDICATIONS: Jorge Alberto Paynegeovani you taking any medications for blood pressure? \" \"Have you missed any doses recently? \"      lisinopril   6. OTHER SYMPTOMS: \"Do you have any symptoms? \" (e.g., headache, chest pain, blurred vision, difficulty breathing, weakness)      SOB and headache   7. PREGNANCY: \"Is there any chance you are pregnant? \" \"When was your last menstrual period? \"      no    Protocols used: HIGH BLOOD PRESSURE-ADULT-[place]    Patient called pre-service center [facility] with red flag complaint. Brief description of triage: Pt reports BP of: 173/94 w/ HR of 111 then 143/74 w/ HR 88. Pt has not picked up new BP meds yet, instructed to do so and monitor BP readings / call back if still elevated       Triage indicates for patient to see pcp in 2 weeks (appt already in place)    Care advice provided, patient verbalizes understanding; denies any other questions or concerns; instructed to call back for any new or worsening symptoms. .    Attention Provider: Thank you for allowing me to participate in the care of your patient. The patient was connected to triage in response to information provided to the [facility]. Please do not respond through this encounter as the response is not directed to a shared pool.

## 2020-11-18 NOTE — PROGRESS NOTES
Subjective:      Patient ID: Juana Mauricio is a 36 y.o. female. HPIam gluc yest was 149, and it's lower at other times. She believes her avg is lower than before    No cough on the lisinopril   Will be getting PT for her L knee  Migraines unchanged  Has had progressive Sx of CTS in her R hand for 2 years, and did not tolerate the injection  Review of Systems   Eyes: Negative for visual disturbance. Musculoskeletal: Positive for arthralgias (L knee for 2-3 weeks, worse with weight bearing). Neurological: Negative for numbness (R hand but not her feet). PMSHx reviewed and/or updated    Objective:   Physical Exam  Constitutional:       Appearance: She is well-developed. She is obese. Neck:      Musculoskeletal: Normal range of motion and neck supple. Thyroid: No thyromegaly. Vascular: No carotid bruit. Cardiovascular:      Rate and Rhythm: Normal rate and regular rhythm. Pulses:           Dorsalis pedis pulses are 0 on the right side and 1+ on the left side. Posterior tibial pulses are 0 on the right side and 0 on the left side. Heart sounds: Normal heart sounds. No murmur. Comments: Feet are warm and there is hair on the  toes  Pulmonary:      Effort: Pulmonary effort is normal.      Breath sounds: Normal breath sounds. No wheezing or rales. Abdominal:      General: Bowel sounds are normal. There is no abdominal bruit. Palpations: Abdomen is soft. There is no mass. Tenderness: There is no abdominal tenderness. Lymphadenopathy:      Cervical: No cervical adenopathy. Upper Body:      Right upper body: No supraclavicular adenopathy. Left upper body: No supraclavicular adenopathy. Skin:     Comments: Plantar skin intact but callused   Neurological:      Sensory: No sensory deficit (vib intact in halluces).        Vitals:    11/18/20 0827 11/18/20 0851   BP: (!) 138/104 (!) 138/94   Site: Left Upper Arm Right Upper Arm   Position: Sitting    Cuff Size: Large Adult Large Adult   Pulse: 94    Temp: 97.7 °F (36.5 °C)    TempSrc: Temporal    SpO2: 99%    Weight: 263 lb (119.3 kg)        Assessment:    HTN , uncontrolled  R carpal tunnel syndrome   obesity   Plan:    change lisinopril 10 mg to 20/12.5  Look into Weight Loss Programs    PT to the L knee  1 week     Cris Velasquez MD

## 2020-11-23 ENCOUNTER — HOSPITAL ENCOUNTER (OUTPATIENT)
Dept: PHYSICAL THERAPY | Age: 40
Setting detail: THERAPIES SERIES
Discharge: HOME | End: 2020-11-23
Payer: MEDICARE

## 2020-11-23 NOTE — PROGRESS NOTES
Physical Therapy  Cancellation/No-show Note  Patient Name:  Lazaro Casey  :  1980   Date:  2020  Cancelled visits to date: 0  No-shows to date: 2    Patient status for today's appointment patient:  []  Cancelled  []  Rescheduled appointment  [x]  No-show 10/28 for eval ,  no show for eval      Reason given by patient:  []  Patient ill  []  Conflicting appointment  []  No transportation    []  Conflict with work  []  No reason given  []  Other:     Comments:      Phone call information:   []  Phone call made today to patient at _ time at number provided:      []  Patient answered, conversation as follows:    []  Patient did not answer, message left as follows:  [x]  Phone call not made today    Electronically signed by:  Kary Beltrán, PT, DPT

## 2020-11-25 ENCOUNTER — HOSPITAL ENCOUNTER (EMERGENCY)
Age: 40
Discharge: HOME | End: 2020-11-25
Payer: MEDICARE

## 2020-11-25 ENCOUNTER — APPOINTMENT (OUTPATIENT)
Dept: GENERAL RADIOLOGY | Age: 40
End: 2020-11-25
Payer: MEDICARE

## 2020-11-25 VITALS
HEART RATE: 103 BPM | TEMPERATURE: 97.8 F | RESPIRATION RATE: 18 BRPM | OXYGEN SATURATION: 99 % | HEIGHT: 61 IN | BODY MASS INDEX: 49.84 KG/M2 | WEIGHT: 264 LBS | DIASTOLIC BLOOD PRESSURE: 80 MMHG | SYSTOLIC BLOOD PRESSURE: 141 MMHG

## 2020-11-25 PROCEDURE — 2580000003 HC RX 258: Performed by: PHYSICIAN ASSISTANT

## 2020-11-25 PROCEDURE — 96374 THER/PROPH/DIAG INJ IV PUSH: CPT

## 2020-11-25 PROCEDURE — 96375 TX/PRO/DX INJ NEW DRUG ADDON: CPT

## 2020-11-25 PROCEDURE — 6360000002 HC RX W HCPCS: Performed by: PHYSICIAN ASSISTANT

## 2020-11-25 PROCEDURE — 99283 EMERGENCY DEPT VISIT LOW MDM: CPT

## 2020-11-25 PROCEDURE — 73560 X-RAY EXAM OF KNEE 1 OR 2: CPT

## 2020-11-25 RX ORDER — 0.9 % SODIUM CHLORIDE 0.9 %
1000 INTRAVENOUS SOLUTION INTRAVENOUS ONCE
Status: COMPLETED | OUTPATIENT
Start: 2020-11-25 | End: 2020-11-25

## 2020-11-25 RX ORDER — METOCLOPRAMIDE HYDROCHLORIDE 5 MG/ML
10 INJECTION INTRAMUSCULAR; INTRAVENOUS ONCE
Status: COMPLETED | OUTPATIENT
Start: 2020-11-25 | End: 2020-11-25

## 2020-11-25 RX ORDER — KETOROLAC TROMETHAMINE 30 MG/ML
30 INJECTION, SOLUTION INTRAMUSCULAR; INTRAVENOUS ONCE
Status: COMPLETED | OUTPATIENT
Start: 2020-11-25 | End: 2020-11-25

## 2020-11-25 RX ORDER — DIPHENHYDRAMINE HYDROCHLORIDE 50 MG/ML
25 INJECTION INTRAMUSCULAR; INTRAVENOUS ONCE
Status: COMPLETED | OUTPATIENT
Start: 2020-11-25 | End: 2020-11-25

## 2020-11-25 RX ADMIN — METOCLOPRAMIDE HYDROCHLORIDE 10 MG: 5 INJECTION INTRAMUSCULAR; INTRAVENOUS at 18:17

## 2020-11-25 RX ADMIN — SODIUM CHLORIDE 1000 ML: 9 INJECTION, SOLUTION INTRAVENOUS at 18:17

## 2020-11-25 RX ADMIN — KETOROLAC TROMETHAMINE 30 MG: 30 INJECTION, SOLUTION INTRAMUSCULAR at 18:17

## 2020-11-25 RX ADMIN — DIPHENHYDRAMINE HYDROCHLORIDE 25 MG: 50 INJECTION, SOLUTION INTRAMUSCULAR; INTRAVENOUS at 18:17

## 2020-11-25 ASSESSMENT — ENCOUNTER SYMPTOMS
SHORTNESS OF BREATH: 0
WHEEZING: 0
NAUSEA: 1
RHINORRHEA: 0
ABDOMINAL PAIN: 0
DIARRHEA: 0
COUGH: 0
VOMITING: 0

## 2020-11-25 ASSESSMENT — PAIN DESCRIPTION - PAIN TYPE: TYPE: ACUTE PAIN

## 2020-11-25 ASSESSMENT — PAIN SCALES - GENERAL: PAINLEVEL_OUTOF10: 10

## 2020-11-25 ASSESSMENT — PAIN DESCRIPTION - LOCATION: LOCATION: HEAD
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
## 2020-11-30 ENCOUNTER — VIRTUAL VISIT (OUTPATIENT)
Dept: PRIMARY CARE CLINIC | Age: 40
End: 2020-11-30
Payer: MEDICARE

## 2020-11-30 ENCOUNTER — HOSPITAL ENCOUNTER (OUTPATIENT)
Age: 40
Discharge: HOME | End: 2020-11-30
Payer: MEDICARE

## 2020-11-30 ENCOUNTER — HOSPITAL ENCOUNTER (OUTPATIENT)
Dept: VASCULAR LAB | Age: 40
Discharge: HOME | End: 2020-11-30
Payer: MEDICARE

## 2020-11-30 LAB
ALBUMIN SERPL-MCNC: 4.2 G/DL (ref 3.4–5)
ANION GAP SERPL CALCULATED.3IONS-SCNC: 12 MMOL/L (ref 3–16)
BUN BLDV-MCNC: 13 MG/DL (ref 7–20)
CALCIUM SERPL-MCNC: 9.6 MG/DL (ref 8.3–10.6)
CHLORIDE BLD-SCNC: 93 MMOL/L (ref 99–110)
CO2: 28 MMOL/L (ref 21–32)
CREAT SERPL-MCNC: 0.7 MG/DL (ref 0.6–1.1)
GFR AFRICAN AMERICAN: >60
GFR NON-AFRICAN AMERICAN: >60
GLUCOSE BLD-MCNC: 127 MG/DL (ref 70–99)
PHOSPHORUS: 3.6 MG/DL (ref 2.5–4.9)
POTASSIUM SERPL-SCNC: 4.1 MMOL/L (ref 3.5–5.1)
SODIUM BLD-SCNC: 133 MMOL/L (ref 136–145)

## 2020-11-30 PROCEDURE — G8417 CALC BMI ABV UP PARAM F/U: HCPCS | Performed by: FAMILY MEDICINE

## 2020-11-30 PROCEDURE — G8484 FLU IMMUNIZE NO ADMIN: HCPCS | Performed by: FAMILY MEDICINE

## 2020-11-30 PROCEDURE — 99214 OFFICE O/P EST MOD 30 MIN: CPT | Performed by: FAMILY MEDICINE

## 2020-11-30 PROCEDURE — 1036F TOBACCO NON-USER: CPT | Performed by: FAMILY MEDICINE

## 2020-11-30 PROCEDURE — G8427 DOCREV CUR MEDS BY ELIG CLIN: HCPCS | Performed by: FAMILY MEDICINE

## 2020-11-30 PROCEDURE — 80069 RENAL FUNCTION PANEL: CPT

## 2020-11-30 PROCEDURE — 93971 EXTREMITY STUDY: CPT

## 2020-11-30 PROCEDURE — 36415 COLL VENOUS BLD VENIPUNCTURE: CPT

## 2020-11-30 RX ORDER — LISINOPRIL AND HYDROCHLOROTHIAZIDE 20; 12.5 MG/1; MG/1
2 TABLET ORAL DAILY
Qty: 60 TABLET | Refills: 5 | COMMUNITY
Start: 2020-11-30 | End: 2021-02-13

## 2020-12-01 ASSESSMENT — ENCOUNTER SYMPTOMS
RESPIRATORY NEGATIVE: 1
ABDOMINAL PAIN: 0

## 2020-12-01 NOTE — PROGRESS NOTES
2020    TELEHEALTH EVALUATION -- Audio/Visual (During QAIFF-39 public health emergency)    HPI:    Sylvester Lyon (:  1980) has requested an audio/video evaluation for the following concern(s):    [facility] her lisinopril HCT every day but yet she reports that today her blood pressure was 178/69, heart rate 100 that was done this morning. .  I asked that she check it again for me in the same she got 177/103 with a heart rate of 102 she tells me that over the last 2 days her right knee has started to hurt and last night her right leg got puffy as did her face. She denies fever and chills, admits to fatigue and a little bit of swelling in the left leg. Has been exercising less than usual    Review of Systems   Respiratory: Negative. Cardiovascular: Positive for palpitations. Negative for chest pain. Gastrointestinal: Negative for abdominal pain. Genitourinary: Negative for pelvic pain. PMSHx reviewed and/or updated    Prior to Visit Medications    Medication Sig Taking?  Authorizing Provider   lisinopril-hydroCHLOROthiazide (PRINZIDE;ZESTORETIC) 20-12.5 MG per tablet Take 2 tablets by mouth daily Yes Francesco Trivedi MD   JANUVIA 100 MG tablet TAKE 1 TABLET BY MOUTH EVERY DAY Yes Francesco Trivedi MD   azelastine (ASTELIN) 0.1 % nasal spray 2 sprays by Nasal route 2 times daily Use in each nostril as directed Yes Joredn Smith MD   cariprazine hcl (VRAYLAR) 1.5 MG capsule Take 1.5 mg by mouth daily Yes Historical Provider, MD   baclofen (LIORESAL) 10 MG tablet Take 10-20 mg by mouth 3 times daily as needed Yes Historical Provider, MD   albuterol sulfate  (90 Base) MCG/ACT inhaler Inhale 2 puffs into the lungs every 6 hours as needed Yes Historical Provider, MD   mometasone-formoterol (DULERA) 100-5 MCG/ACT inhaler Inhale 2 puffs into the lungs 2 times daily Yes Historical Provider, MD   meloxicam (MOBIC) 15 MG tablet Take 15 mg by mouth daily Yes Historical Provider, MD   ibuprofen (ADVIL;MOTRIN) 800 MG tablet Take 800 mg by mouth every 8 hours as needed Yes Historical Provider, MD   diclofenac (VOLTAREN) 75 MG EC tablet Take 75 mg by mouth 2 times daily as needed Yes Historical Provider, MD   Eptinezumab-jjmr 100 MG/ML SOLN Infuse 100 mg intravenously Yes Historical Provider, MD   Cetirizine HCl (ZYRTEC ALLERGY) 10 MG CAPS Take 10 mg by mouth nightly as needed Yes Historical Provider, MD   Melatonin 3 MG CAPS Take 1 to 2 tablets PO qhs PRN. Yes Historical Provider, MD   naproxen (NAPROSYN) 500 MG tablet Take 1 tablet by mouth 2 times daily (with meals) Yes IRA Falcon   busPIRone (BUSPAR) 7.5 MG tablet Take 7.5 mg by mouth daily Yes Historical Provider, MD   atorvastatin (LIPITOR) 20 MG tablet Take 20 mg by mouth daily Yes Historical Provider, MD   Rimegepant Sulfate (NURTEC) 75 MG TBDP Take 75 mg by mouth Yes Historical Provider, MD   blood glucose monitor strips Test twice daily Yes John Hannah MD   Lancets MISC 1 each by Does not apply route daily Diabetes Mellitus E11.9 Yes John Hannah MD   fluticasone (FLONASE) 50 MCG/ACT nasal spray 2 sprays by Nasal route 2 times daily Yes Ivelisse Mendoza MD   LORazepam (ATIVAN) 0.5 MG tablet Take 0.5 mg by mouth. Yes Historical Provider, MD   blood glucose monitor kit and supplies Test 1 times a day & as needed for symptoms of irregular blood glucose. Diabetes Mellitus E11.9 Yes Liana Baltazar MD       Social History     Tobacco Use    Smoking status: Never Smoker    Smokeless tobacco: Never Used    Tobacco comment: around 2nd hand smoke    Substance Use Topics    Alcohol use: No    Drug use:  No            PHYSICAL EXAMINATION:  [ INSTRUCTIONS:  \"[x]\" Indicates a positive item  \"[]\" Indicates a negative item  -- DELETE ALL ITEMS NOT EXAMINED]  Vital Signs: (As obtained by patient/caregiver or practitioner observation)    Blood pressure- 177/103 Heart rate- 102   Respiratory rate-    Temperature-  Pulse oximetry- Constitutional: [] Appears well-developed and well-nourished [] No apparent distress      [x] Abnormal- obese, somewhat agitated  Mental status  [x] Alert and awake  [x] Oriented to person/place/time []Able to follow commands      Eyes:  EOM    [x]  Normal  [] Abnormal-  Sclera  []  Normal  [] Abnormal -         Discharge []  None visible  [] Abnormal -    HENT:   [x] Normocephalic, atraumatic. [] Abnormal   [] Mouth/Throat: Mucous membranes are moist.     External Ears [] Normal  [] Abnormal-     Neck: [] No visualized mass     Pulmonary/Chest: [x] Respiratory effort normal.  [x] No visualized signs of difficulty breathing or respiratory distress        [] Abnormal-      Musculoskeletal:   [] Normal gait with no signs of ataxia         [] Normal range of motion of neck        [] Abnormal-       Neurological:        [x] No Facial Asymmetry (Cranial nerve 7 motor function) (limited exam to video visit)          [x] No gaze palsy        [] Abnormal-         Skin:        [x] No significant exanthematous lesions or discoloration noted on facial skin         [] Abnormal-            Psychiatric:       [] Normal Affect [] No Hallucinations        [] Abnormal-     Other pertinent observable physical exam findings- facial edema  ASSESSMENT/PLAN:  Leg edema - R/O DVT, get a doppler stat  Facial swelling - Cushingoid? Check cortisol level and renal panel  Hypertension uncontrolled-double the lisinopril HCT to 40/12.5  Report blood pressure in a few days    Noa Ga is a 36 y.o. female being evaluated by a Virtual Visit (video visit) encounter to address concerns as mentioned above. A caregiver was present when appropriate. Due to this being a TeleHealth encounter (During [facility] public health emergency), evaluation of the following organ systems was limited: Vitals/Constitutional/EENT/Resp/CV/GI//MS/Neuro/Skin/Heme-Lymph-Imm.   Pursuant to the emergency declaration under the [address] Emergencies Act, 1135 waiver authority and the Coronavirus Preparedness and Response Supplemental Appropriations Act, this Virtual Visit was conducted with patient's (and/or legal guardian's) consent, to reduce the patient's risk of exposure to COVID-19 and provide necessary medical care. The patient (and/or legal guardian) has also been advised to contact this office for worsening conditions or problems, and seek emergency medical treatment and/or call 911 if deemed necessary. Patient identification was verified at the start of the visit: Yes    Total time spent on this encounter: Not billed by time    Services were provided through a video synchronous discussion virtually to substitute for in-person clinic visit. Patient and provider were located at their individual homes. --Mona Durbin MD on 11/30/2020 at 11:14 PM    An electronic signature was used to authenticate this note.

## 2020-12-03 ENCOUNTER — TELEPHONE (OUTPATIENT)
Dept: PRIMARY CARE CLINIC | Age: 40
End: 2020-12-03

## 2020-12-03 ENCOUNTER — HOSPITAL ENCOUNTER (OUTPATIENT)
Age: 40
Discharge: HOME | End: 2020-12-03
Payer: MEDICARE

## 2020-12-03 ENCOUNTER — PATIENT MESSAGE (OUTPATIENT)
Dept: PRIMARY CARE CLINIC | Age: 40
End: 2020-12-03

## 2020-12-03 LAB — CORTISOL - AM: 15.2 UG/DL (ref 4.3–22.4)

## 2020-12-03 PROCEDURE — 36415 COLL VENOUS BLD VENIPUNCTURE: CPT

## 2020-12-03 PROCEDURE — 82533 TOTAL CORTISOL: CPT

## 2020-12-03 RX ORDER — ONDANSETRON 4 MG/1
4 TABLET, ORALLY DISINTEGRATING ORAL EVERY 8 HOURS PRN
Qty: 20 TABLET | Refills: 0 | Status: SHIPPED | OUTPATIENT
Start: 2020-12-03 | End: 2022-05-20 | Stop reason: SDUPTHER

## 2020-12-03 NOTE — TELEPHONE ENCOUNTER
From: Maria Del Rosario Leahy  To:  Ricarda Seay MD  Sent: 12/3/2020 3:56 AM EST  Subject: Prescription Question    I used to be on zofran for nausea bob out and been nausea to my stomach can I get a refill of it please

## 2020-12-04 NOTE — TELEPHONE ENCOUNTER
Blood pressure has come down to 153/90. I told her about the normal cortisol level. She is to continue taking the 2 lisinopril tabs per day and call me on the seventh to report.   If her blood pressure is still high I will add another medication

## 2020-12-07 ENCOUNTER — OFFICE VISIT (OUTPATIENT)
Dept: PRIMARY CARE CLINIC | Age: 40
End: 2020-12-07
Payer: MEDICARE

## 2020-12-07 PROCEDURE — G8417 CALC BMI ABV UP PARAM F/U: HCPCS | Performed by: NURSE PRACTITIONER

## 2020-12-07 PROCEDURE — G8428 CUR MEDS NOT DOCUMENT: HCPCS | Performed by: NURSE PRACTITIONER

## 2020-12-07 PROCEDURE — 99211 OFF/OP EST MAY X REQ PHY/QHP: CPT | Performed by: NURSE PRACTITIONER

## 2020-12-07 NOTE — PROGRESS NOTES
Juana Mauricio received a viral test for COVID-19. They were educated on isolation and quarantine as appropriate. For any symptoms, they were directed to seek care from their PCP, given contact information to establish with a doctor, directed to an urgent care or the emergency room.

## 2020-12-07 NOTE — PATIENT INSTRUCTIONS

## 2020-12-08 ENCOUNTER — NURSE TRIAGE (OUTPATIENT)
Dept: OTHER | Facility: CLINIC | Age: 40
End: 2020-12-08

## 2020-12-08 LAB — SARS-COV-2: NOT DETECTED

## 2020-12-08 NOTE — PROGRESS NOTES
Name_______________________________________Printed:____________________  Date and time of surgery___12/11/2020   1230_____________________Arrival Time:___1100   [facility]_____________   1. The instructions given regarding when and if a patient needs to stop oral intake prior to surgery varies. Follow the specific instructions you were given                  _X__Nothing to eat or to drink after Midnight the night before.                             ____Endoscopy patient follow your DRS instructions-generally you will be doing a part of the prep after Midnight                   ____Carbo loading or ERAS instructions will be given to select patients-if you have been given those instructions -please do the following                           The evening before your surgery after dinner before midnight drink 40 ounces of gatorade. If you are diabetic use sugar free. The morning of surgery drink 40 ounces of water. This needs to be finished 3 hours prior to your surgery start time. 2. Take the following pills with a small sip of water on the morning of surgery___VRAYLAR, LORAZEPAM IF NEEDED, USE YOUR Radonna Laws INHALER________________________________________________                X  Do not take blood pressure medications ending in pril or sartan the mikaela prior to surgery or the morning of surgery_   3. Aspirin, Ibuprofen, Advil, Naproxen, Vitamin E and other Anti-inflammatory products and supplements should be stopped for 5 -7days before surgery or as directed by your physician. 4. Check with your Doctor regarding stopping Plavix, Coumadin,Eliquis, Lovenox,Effient,Pradaxa,Xarelto, Fragmin or other blood thinners and follow their instructions. 5. Do not smoke, and do not drink any alcoholic beverages 24 hours prior to surgery. This includes NA Beer. Refrain from the usage of any recreational drugs. 6. You may brush your teeth and gargle the morning of surgery. DO NOT SWALLOW WATER   7.  You MUST make arrangements for a responsible adult to stay on site while you are here and take you home after your surgery. You will not be allowed to leave alone or drive yourself home. It is strongly suggested someone stay with you the first 24 hrs. Your surgery will be cancelled if you do not have a ride home. 8. A parent/legal guardian must accompany a child scheduled for surgery and plan to stay at the hospital until the child is discharged. Please do not bring other children with you. 9. Please wear simple, loose fitting clothing to the hospital.  Louvella Castleman not bring valuables (money, credit cards, checkbooks, etc.) Do not wear any makeup (including no eye makeup) or nail polish on your fingers or toes. 10. DO NOT wear any jewelry or piercings on day of surgery. All body piercing jewelry must be removed. 11. If you have ___dentures, they will be removed before going to the OR; we will provide you a container. If you wear ___contact lenses or ___glasses, they will be removed; please bring a case for them. 12. Please see your family doctor/pediatrician for a history & physical and/or concerning medications. Bring any test results/reports from your physician's office. PCP__________________Phone___________H&P Appt. Date________             13 If you  have a Living Will and Durable Power of  for Healthcare, please bring in a copy. 15. Notify your Surgeon if you develop any illness between now and surgery  time, cough, cold, fever, sore throat, nausea, vomiting, etc.  Please notify your surgeon if you experience dizziness, shortness of breath or blurred vision between now & the time of your surgery             15. DO NOT shave your operative site 96 hours prior to surgery. For face & neck surgery, men may use an electric razor 48 hours prior to surgery. 16. Shower the night before or morning of surgery using an antibacterial soap or as you have been instructed.              17. To provide excellent care visitors will be limited to one in the room at any given time. 18.  Please bring picture ID and insurance card. 19.  Visit our web site for additional information:  [email or web address]              20.During flu season no children under the age of 15 are permitted in the hospital for the safety of all patients. 21. If you take a long acting insulin in the evening only  take half of your usual  dose the night  before your procedure              22. If you use a c-pap please bring DOS if staying overnight,             23.For your convenience [facility] has a pharmacy on site to fill your prescriptions. 24. If you use oxygen and have a portable tank please bring it  with you the DOS             25. Bring a complete list of all your medications with name and dose include any supplements. 26. Other__________________________________________   *Please call pre admission testing if you any further questions   [address]. [facility]  [phone]   [address] [place] [address]       All above information reviewed with patient in person or by phone. Patient verbalizes understanding. All questions and concerns addressed. Patient/Rep___PATIENT_________________                                                                                                                             There is a one visitor policy at [facility] for all surgeries and endoscopies. Whether the visitor can stay or will be asked to wait in the car will depend on the current policy and if social distancing can be maintained. The policy is subject to change at any time. Please make sure the visitor has a cell phone that is on,charged and able to accept calls, as this may be the way that the staff communicates with them. Pain management is NO VISITOR policyThe patients ride is expected to remain in the car with a cell phone for communication. If the ride is leaving the hospital grounds please make sure they are back in time for pickup. Have the patient inform the staff on arrival what their rides plans are while the patient is in the facility. At the MAIN there is one visitor allowed. Please note that the visitor policy is subject to change.        PRE OP INSTRUCTIONS

## 2020-12-08 NOTE — TELEPHONE ENCOUNTER
Reason for Disposition   [1] MODERATE dizziness (e.g., interferes with normal activities) AND [2] has NOT been evaluated by physician for this  (Exception: dizziness caused by heat exposure, sudden standing, or poor fluid intake)    Answer Assessment - Initial Assessment Questions  1. DESCRIPTION: \"Describe your dizziness. \"      Like head is spinning    2. LIGHTHEADED: \"Do you feel lightheaded? \" (e.g., somewhat faint, woozy, weak upon standing)      Yes     3. VERTIGO: \"Do you feel like either you or the room is spinning or tilting? \" (i.e. vertigo)      No    4. SEVERITY: \"How bad is it? \"  \"Do you feel like you are going to faint? \" \"Can you stand and walk? \"    - MILD - walking normally    - MODERATE - interferes with normal activities (e.g., work, school)     - SEVERE - unable to stand, requires support to walk, feels like passing out now. Moderate    5. ONSET:  \"When did the dizziness begin? \"      Since yesterday    6. AGGRAVATING FACTORS: \"Does anything make it worse? \" (e.g., standing, change in head position)      No    7. HEART RATE: \"Can you tell me your heart rate? \" \"How many beats in 15 seconds? \"  (Note: not all patients can do this)        96 /74    8. CAUSE: \"What do you think is causing the dizziness? \"      Not sure    9. RECURRENT SYMPTOM: \"Have you had dizziness before? \" If so, ask: \"When was the last time? \" \"What happened that time? \"      Yes just layed down and went away     10. OTHER SYMPTOMS: \"Do you have any other symptoms? \" (e.g., fever, chest pain, vomiting, diarrhea, bleeding)        No    11. PREGNANCY: \"Is there any chance you are pregnant? \" \"When was your last menstrual period? \"        no    Protocols used: DIZZINESS - LIGHTHEADEDNESS-ADULT-AH    Caller provided care advice and instructed to call back with worsening symptoms. Attention Provider: Thank you for allowing me to participate in the care of your patient.   The patient was connected to triage in response to information provided to the ECC. Please do not respond through this encounter as the response is not directed to a shared pool.      Warm transfer Magy when went to reconnect to schedule pt today pt had hung up the phone tried to call pt back went to  left message for pt to return call for appt per triage within 24 hrs

## 2020-12-10 ENCOUNTER — VIRTUAL VISIT (OUTPATIENT)
Dept: PULMONOLOGY | Age: 40
End: 2020-12-10
Payer: MEDICARE

## 2020-12-10 ENCOUNTER — TELEPHONE (OUTPATIENT)
Dept: PULMONOLOGY | Age: 40
End: 2020-12-10

## 2020-12-10 PROBLEM — E11.9 TYPE 2 DIABETES MELLITUS WITHOUT COMPLICATION, WITHOUT LONG-TERM CURRENT USE OF INSULIN (HCC): Chronic | Status: ACTIVE | Noted: 2019-10-17

## 2020-12-10 PROCEDURE — 2022F DILAT RTA XM EVC RTNOPTHY: CPT | Performed by: INTERNAL MEDICINE

## 2020-12-10 PROCEDURE — G8427 DOCREV CUR MEDS BY ELIG CLIN: HCPCS | Performed by: INTERNAL MEDICINE

## 2020-12-10 PROCEDURE — 99214 OFFICE O/P EST MOD 30 MIN: CPT | Performed by: INTERNAL MEDICINE

## 2020-12-10 PROCEDURE — 3051F HG A1C>EQUAL 7.0%<8.0%: CPT | Performed by: INTERNAL MEDICINE

## 2020-12-10 NOTE — LETTER
[facility] Sleep Medicine  [address] [address] [place] [address]  Phone: [phone]  Fax: [phone]    December 10, 2020       Patient: Lazaro Casey   MR Number: [id number]   YOB: 1980   Date of Visit: 12/10/2020       Joshua Parker was seen for a follow up visit today. Here is my assessment and plan as well as an attached copy of her visit today:    PRISCILLA (obstructive sleep apnea)  Chronic-worsening:  Continue medications per her PCP and other physicians. Instructed not to drive unless had 4 hrs of effective therapy for her PRISCILLA the night before. Did review the risks of under or untreated PRISCILLA including, but not limited to, higher risks of motor vehicle accidents, stroke, heart attacks, and death. She understands and accepts all these risks. Reviewed sleep study with patient as well as the pros and cons of multiple treatment options. At this time she would like to proceed with CPAP titration which is the most effective treatment for PRISCILLA but her insurance company will not allow a titration till a trial of Auto CPAP despite it being medically indicated and the standard care. Instructed her not to drive when drowsy and unless she has worn her machine at least 4 hrs the night before. Continue medications per her PCP and other physicians. Will send an order to her DME company of choice for AutoCPAP trial (Pmin-8 Pmax-20 Flex-3 with heated humidification and mask of choice) per her insurance company. If she fails AutoCPAP trial then will need a formal in-lab titration. 8 wk follow up in the office. The chronic medical conditions listed are directly related to the primary diagnosis listed above. The management of the primary diagnosis affects the secondary diagnosis and vice versa. HTN (hypertension), benign  Chronic- Stable. Cont meds per PCP and other physicians.     Type 2 diabetes mellitus without complication, without long-term current use of insulin ([address] Chronic- Stable. Cont meds per PCP and other physicians. Bipolar disorder ([facility] [address]  Chronic- Stable. Cont meds per PCP and other physicians. Class 3 severe obesity due to excess calories with serious comorbidity and body mass index (BMI) of 45.0 to 49.9 in adult (HCC)  Chronic-Stable. Encouraged her to work on weight loss through diet and exercise. If you have questions or concerns, please do not hesitate to call me. I look forward to following Alla Pichardo along with you.     Sincerely,    Mane Dunn MD    CC providers:  Gopal Gongora MD  [facility] [facility] [place] [place] [address]  [address] [place] [address]  [facility]

## 2020-12-10 NOTE — PROGRESS NOTES
Asif Whipple MD, Amber Bennett, [facility]  Tiffanie Kehrt CNP  Azael Jolley CNP [address]  3rd Floor, [address] [facility], [facility] Miguelito De La Torre E [phone]   [facility] [address] Omi Singh. [address] [facility]. [address] [phone]     Video Visit- Follow up    Pursuant to the emergency declaration under the [address] [place] [address], [facility] authority and the [employer] and [employer] Act, this Virtual  Visit was conducted, with patient's consent, to reduce the patient's risk of exposure to COVID-19 and provide continuity of care for an established patient. Services were provided through a video synchronous discussion virtually to substitute for in-person clinic visit. Patient was located in their home. Assessment/Plan:     1. PRISCILLA (obstructive sleep apnea)  Assessment & Plan:  Chronic-worsening:  Continue medications per her PCP and other physicians. Instructed not to drive unless had 4 hrs of effective therapy for her PRISCILLA the night before. Did review the risks of under or untreated PRISCILLA including, but not limited to, higher risks of motor vehicle accidents, stroke, heart attacks, and death. She understands and accepts all these risks. Reviewed sleep study with patient as well as the pros and cons of multiple treatment options. At this time she would like to proceed with CPAP titration which is the most effective treatment for PRISCILLA but her insurance company will not allow a titration till a trial of Auto CPAP despite it being medically indicated and the standard care. Instructed her not to drive when drowsy and unless she has worn her machine at least 4 hrs the night before. Continue medications per her PCP and other physicians. Will send an order to her DME company of choice for AutoCPAP trial (Pmin-8 Pmax-20 Flex-3 with heated humidification and mask of choice) per her insurance company.   If she fails AutoCPAP trial then will need a formal in-lab titration. 8 wk follow up in the office. The chronic medical conditions listed are directly related to the primary diagnosis listed above. The management of the primary diagnosis affects the secondary diagnosis and vice versa. 2. HTN (hypertension), benign  Assessment & Plan:  Chronic- Stable. Cont meds per PCP and other physicians. 3. Type 2 diabetes mellitus without complication, without long-term current use of insulin ([facility])  Assessment & Plan:  Chronic- Stable. Cont meds per PCP and other physicians. 4. Bipolar disorder, in full remission, most recent episode depressed ([facility] [address]  Assessment & Plan:  Chronic- Stable. Cont meds per PCP and other physicians. 5. Class 3 severe obesity due to excess calories with serious comorbidity and body mass index (BMI) of 45.0 to 49.9 in [facility])  Assessment & Plan:  Chronic-Stable. Encouraged her to work on weight loss through diet and exercise. Diagnoses of PRISCILLA (obstructive sleep apnea), HTN (hypertension), benign, Type 2 diabetes mellitus without complication, without long-term current use of insulin ([facility] [address] Bipolar disorder, in full remission, most recent episode depressed ([facility] [address] and Class 3 severe obesity due to excess calories with serious comorbidity and body mass index (BMI) of 45.0 to 49.9 in [facility]) were pertinent to this visit. The chronic medical conditions listed are directly related to the primary diagnosis listed above. The management of the primary diagnosis affects the secondary diagnosis and vice versa. Subjective:     Patient ID: Juana Mauricio is a 36 y.o. female. Chief Complaint   Patient presents with    Daytime Sleepiness     Subjective   HPI:      PRISCILLA:   Has had several studies diagnosing her with sleep apnea and seen several physicians at several hospitals. She states she got a CPAP machine back in 2019 but was smothering on her machine and had to return it but never followed up with her physician.   Still having significant daytime sleepiness. Review the records show she does have sleep apnea diagnosed on several different studies but has never had an in lab titration. Homer Persaud         : 1980    Diagnosis: [x] PRISCILLA (G47.33) [] CSA (G47.31) [] Apnea (G47.30)   Length of Need: [x] 12 Months [] 99 Months [] Other:    Machine (FLORENTINO!): [x] Respironics Dream Station      Auto [] ResMed AirSense     Auto [] Other:     [x]  CPAP () [] Bilevel ()   Mode: [x] Auto [] Spontaneous    Mode: [] Auto [] Spontaneous      Pmin-8  Pmax-20                 Comfort Settings:   - Ramp Pressure: 5 cmH2O                                        - Ramp time: 15 min                                     -  Flex/EPR - 3 full time                                    - For ResMed Bilevel (TiMax-4 sec   TiMin- 0.2 sec)     Humidifier: [x] Heated ()        [x] Water chamber replacement ()/ 1 per 6 months        Mask:   [x] Nasal () /1 per 3 months [] Full Face () /1 per 3 months   [x] Patient choice -Size and fit mask [] Patient Choice - Size and fit mask   [] Dispense:  [] Dispense:    [x] Headgear () / 1 per 3 months [] Headgear () / 1 per 3 months   [x] Replacement Nasal Cushion ()/2 per month [] Interface Replacement ()/1 per month   [x] Replacement Nasal Pillows ()/2 per month         Tubing: [x] Heated ()/1 per 3 months    [] Standard ()/1 per 3 months [] Other:           Filters: [x] Non-disposable ()/1 per 6 months     [x] Ultra-Fine, Disposable ()/2 per month        Miscellaneous: [] Chin Strap ()/ 1 per 6 months [] O2 bleed-in:       LPM   [] Oximetry on CPAP/Bilevel []  Other:    [x] Modem: ()         Start Order Date: 12/10/20    MEDICAL JUSTIFICATION:  I, the undersigned, certify that the above prescribed supplies are medically necessary for this patients wellbeing.   In my opinion, the supplies are both reasonable and necessary in reference to accepted standards of medicalpractice in treatment of this patients condition. Chayito Mitchell MD      NPI: [id number]       Order Signed Date: 12/10/20    Electronically signed by Chayito Mitchell MD on 12/10/2020 at 2:53 PM       Current Outpatient Medications   Medication Sig Dispense Refill    ondansetron (ZOFRAN ODT) 4 MG disintegrating tablet Take 1 tablet by mouth every 8 hours as needed for Nausea 20 tablet 0    lisinopril-hydroCHLOROthiazide (PRINZIDE;ZESTORETIC) 20-12.5 MG per tablet Take 2 tablets by mouth daily 60 tablet 5    JANUVIA 100 MG tablet TAKE 1 TABLET BY MOUTH EVERY DAY 30 tablet 5    cariprazine hcl (VRAYLAR) 1.5 MG capsule Take 1.5 mg by mouth daily      baclofen (LIORESAL) 10 MG tablet Take 10-20 mg by mouth 3 times daily as needed      diclofenac (VOLTAREN) 75 MG EC tablet Take 75 mg by mouth 2 times daily as needed      Eptinezumab-jjmr 100 MG/ML SOLN Infuse 100 mg intravenously      Melatonin 3 MG CAPS Take 1 to 2 tablets PO qhs PRN.  busPIRone (BUSPAR) 7.5 MG tablet Take 7.5 mg by mouth daily      blood glucose monitor strips Test twice daily 100 strip 3    Lancets MISC 1 each by Does not apply route daily Diabetes Mellitus E11.9 100 each 3    LORazepam (ATIVAN) 0.5 MG tablet Take 0.5 mg by mouth.  blood glucose monitor kit and supplies Test 1 times a day & as needed for symptoms of irregular blood glucose. Diabetes Mellitus E11.9 1 kit 0    albuterol sulfate  (90 Base) MCG/ACT inhaler Inhale 2 puffs into the lungs every 6 hours as needed      mometasone-formoterol (DULERA) 100-5 MCG/ACT inhaler Inhale 2 puffs into the lungs 2 times daily      Rimegepant Sulfate (NURTEC) 75 MG TBDP Take 75 mg by mouth       No current facility-administered medications for this visit.         Allergies as of 12/10/2020 - Review Complete 12/10/2020   Allergen Reaction Noted    Metformin Nausea And Vomiting, Nausea Only, Rash, and Shortness Of Breath 10/10/2019    Sertraline Anaphylaxis and Rash 10/23/2018    Fluoxetine Other (See Comments) 10/06/2020    Oxycodone-acetaminophen Itching 08/31/2020    Tape Frandy Junes tape] Rash and Other (See Comments) 10/03/2019         Electronically signed by Chayito Mitchell MD on 12/10/2020 at 2:51 PM

## 2020-12-10 NOTE — TELEPHONE ENCOUNTER
Spoke with [facility] sleep center . HST to be fax'd per Joycelyn Hernandez . LM for [facility] sleep [place] letting them know we need previous sleep studies for pt,

## 2020-12-10 NOTE — ASSESSMENT & PLAN NOTE
Chronic-worsening:  Continue medications per her PCP and other physicians. Instructed not to drive unless had 4 hrs of effective therapy for her PRISCILLA the night before. Did review the risks of under or untreated PRISCILLA including, but not limited to, higher risks of motor vehicle accidents, stroke, heart attacks, and death. She understands and accepts all these risks. Reviewed sleep study with patient as well as the pros and cons of multiple treatment options. At this time she would like to proceed with CPAP titration which is the most effective treatment for PRISCILLA but her insurance company will not allow a titration till a trial of Auto CPAP despite it being medically indicated and the standard care. Instructed her not to drive when drowsy and unless she has worn her machine at least 4 hrs the night before. Continue medications per her PCP and other physicians. Will send an order to her DME company of choice for AutoCPAP trial (Pmin-8 Pmax-20 Flex-3 with heated humidification and mask of choice) per her insurance company. If she fails AutoCPAP trial then will need a formal in-lab titration. 8 wk follow up in the office. The chronic medical conditions listed are directly related to the primary diagnosis listed above. The management of the primary diagnosis affects the secondary diagnosis and vice versa. · On chronic immunosuppression with tacrolimus and prednisone  · Unfortunately patient noncompliant with all medications since discharge including these  · Tacrolimus level was 4 > 10 > 7 (12/12) > 5 (12/14) remains stable at 4 4  · dose decreased to 2 BID on 12/12, again decrease to 2 mg QAM & 1 5 mg HS from 12/14  · Tacrolimus level was stable on the current dosing      · Last level 4 5

## 2020-12-11 ENCOUNTER — ANESTHESIA EVENT (OUTPATIENT)
Dept: ENDOSCOPY | Age: 40
End: 2020-12-11
Payer: MEDICARE

## 2020-12-11 ENCOUNTER — HOSPITAL ENCOUNTER (OUTPATIENT)
Age: 40
Setting detail: OUTPATIENT SURGERY
Discharge: HOME | End: 2020-12-11
Attending: INTERNAL MEDICINE | Admitting: INTERNAL MEDICINE
Payer: MEDICARE

## 2020-12-11 ENCOUNTER — ANESTHESIA (OUTPATIENT)
Dept: ENDOSCOPY | Age: 40
End: 2020-12-11
Payer: MEDICARE

## 2020-12-11 VITALS
DIASTOLIC BLOOD PRESSURE: 65 MMHG | RESPIRATION RATE: 25 BRPM | OXYGEN SATURATION: 99 % | SYSTOLIC BLOOD PRESSURE: 101 MMHG

## 2020-12-11 VITALS
RESPIRATION RATE: 16 BRPM | DIASTOLIC BLOOD PRESSURE: 74 MMHG | BODY MASS INDEX: 49.84 KG/M2 | WEIGHT: 264 LBS | HEART RATE: 95 BPM | TEMPERATURE: 97.6 F | OXYGEN SATURATION: 97 % | SYSTOLIC BLOOD PRESSURE: 115 MMHG | HEIGHT: 61 IN

## 2020-12-11 LAB
GLUCOSE BLD-MCNC: 158 MG/DL (ref 70–99)
GLUCOSE BLD-MCNC: 172 MG/DL (ref 70–99)
HCG(URINE) PREGNANCY TEST: NEGATIVE
PERFORMED ON: ABNORMAL
PERFORMED ON: ABNORMAL

## 2020-12-11 PROCEDURE — 2500000003 HC RX 250 WO HCPCS: Performed by: NURSE ANESTHETIST, CERTIFIED REGISTERED

## 2020-12-11 PROCEDURE — 6360000002 HC RX W HCPCS: Performed by: NURSE ANESTHETIST, CERTIFIED REGISTERED

## 2020-12-11 PROCEDURE — 2709999900 HC NON-CHARGEABLE SUPPLY: Performed by: INTERNAL MEDICINE

## 2020-12-11 PROCEDURE — 2580000003 HC RX 258: Performed by: FAMILY MEDICINE

## 2020-12-11 PROCEDURE — 3700000000 HC ANESTHESIA ATTENDED CARE: Performed by: INTERNAL MEDICINE

## 2020-12-11 PROCEDURE — 7100000010 HC PHASE II RECOVERY - FIRST 15 MIN: Performed by: INTERNAL MEDICINE

## 2020-12-11 PROCEDURE — 84703 CHORIONIC GONADOTROPIN ASSAY: CPT

## 2020-12-11 PROCEDURE — 3609012400 HC EGD TRANSORAL BIOPSY SINGLE/MULTIPLE: Performed by: INTERNAL MEDICINE

## 2020-12-11 PROCEDURE — 3700000001 HC ADD 15 MINUTES (ANESTHESIA): Performed by: INTERNAL MEDICINE

## 2020-12-11 PROCEDURE — 7100000011 HC PHASE II RECOVERY - ADDTL 15 MIN: Performed by: INTERNAL MEDICINE

## 2020-12-11 RX ORDER — SODIUM CHLORIDE 9 MG/ML
INJECTION, SOLUTION INTRAVENOUS CONTINUOUS
Status: DISCONTINUED | OUTPATIENT
Start: 2020-12-11 | End: 2020-12-11 | Stop reason: HOSPADM

## 2020-12-11 RX ORDER — PROPOFOL 10 MG/ML
INJECTION, EMULSION INTRAVENOUS PRN
Status: DISCONTINUED | OUTPATIENT
Start: 2020-12-11 | End: 2020-12-11 | Stop reason: SDUPTHER

## 2020-12-11 RX ORDER — PANTOPRAZOLE SODIUM 40 MG/1
40 TABLET, DELAYED RELEASE ORAL
Qty: 30 TABLET | Refills: 5 | Status: SHIPPED | OUTPATIENT
Start: 2020-12-11

## 2020-12-11 RX ORDER — LIDOCAINE HYDROCHLORIDE 20 MG/ML
INJECTION, SOLUTION INFILTRATION; PERINEURAL PRN
Status: DISCONTINUED | OUTPATIENT
Start: 2020-12-11 | End: 2020-12-11 | Stop reason: SDUPTHER

## 2020-12-11 RX ADMIN — LIDOCAINE HYDROCHLORIDE 60 MG: 20 INJECTION, SOLUTION INFILTRATION; PERINEURAL at 09:20

## 2020-12-11 RX ADMIN — PROPOFOL 50 MG: 10 INJECTION, EMULSION INTRAVENOUS at 09:26

## 2020-12-11 RX ADMIN — PROPOFOL 50 MG: 10 INJECTION, EMULSION INTRAVENOUS at 09:25

## 2020-12-11 RX ADMIN — PROPOFOL 150 MG: 10 INJECTION, EMULSION INTRAVENOUS at 09:20

## 2020-12-11 RX ADMIN — PROPOFOL 70 MG: 10 INJECTION, EMULSION INTRAVENOUS at 09:27

## 2020-12-11 RX ADMIN — PROPOFOL 50 MG: 10 INJECTION, EMULSION INTRAVENOUS at 09:23

## 2020-12-11 RX ADMIN — SODIUM CHLORIDE: 9 INJECTION, SOLUTION INTRAVENOUS at 07:57

## 2020-12-11 ASSESSMENT — PAIN SCALES - GENERAL
PAINLEVEL_OUTOF10: 0

## 2020-12-11 ASSESSMENT — PAIN - FUNCTIONAL ASSESSMENT: PAIN_FUNCTIONAL_ASSESSMENT: 0-10
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
## 2020-12-11 NOTE — ANESTHESIA PRE PROCEDURE
Department of Anesthesiology  Preprocedure Note       Name:  Nicole Nguyen   Age:  36 y.o.  :  1980                                          MRN:  [id number]         Date:  2020      Surgeon: Michael Lowe):  Matt Salazar MD    Procedure: Procedure(s):  EGD DIAGNOSTIC ONLY    Medications prior to admission:   Prior to Admission medications    Medication Sig Start Date End Date Taking?  Authorizing Provider   ondansetron (ZOFRAN ODT) 4 MG disintegrating tablet Take 1 tablet by mouth every 8 hours as needed for Nausea 12/3/20   Germán Carr MD   lisinopril-hydroCHLOROthiazide (PRINZIDE;ZESTORETIC) 20-12.5 MG per tablet Take 2 tablets by mouth daily 20   Germán Carr MD   JANUVIA 100 MG tablet TAKE 1 TABLET BY MOUTH EVERY DAY 20   Germán Carr MD   cariprazine hcl (VRAYLAR) 1.5 MG capsule Take 1.5 mg by mouth daily 10/6/20   Historical Provider, MD   baclofen (LIORESAL) 10 MG tablet Take 10-20 mg by mouth 3 times daily as needed 20   Historical Provider, MD   albuterol sulfate  (90 Base) MCG/ACT inhaler Inhale 2 puffs into the lungs every 6 hours as needed 20   Historical Provider, MD   mometasone-formoterol [facility]) 100-5 MCG/ACT inhaler Inhale 2 puffs into the lungs 2 times daily 20   Historical Provider, MD   diclofenac (VOLTAREN) 75 MG EC tablet Take 75 mg by mouth 2 times daily as needed 20   Historical Provider, MD   Eptinezumab-jjmr 100 MG/ML SOLN Infuse 100 mg intravenously 20   Historical Provider, MD   Melatonin 3 MG CAPS Take 1 to 2 tablets PO qhs PRN. 20   Historical Provider, MD   busPIRone (BUSPAR) 7.5 MG tablet Take 7.5 mg by mouth daily 20   Historical Provider, MD   Rimegepant Sulfate (NURTEC) 75 MG TBDP Take 75 mg by mouth    Historical Provider, MD   blood glucose monitor strips Test twice daily 10/28/20   Germán Carr MD   Lancets MISC 1 each by Does not apply route daily Diabetes Mellitus E11.9 10/28/20   Jesus Siu MD [place]   LORazepam (ATIVAN) 0.5 MG tablet Take 0.5 mg by mouth. 10/2/19   Historical Provider, MD   blood glucose monitor kit and supplies Test 1 times a day & as needed for symptoms of irregular blood glucose. Diabetes Mellitus E11.9 12/2/19   Nai Bustos MD       Current medications:    Current Facility-Administered Medications   Medication Dose Route Frequency Provider Last Rate Last Dose    0.9 % sodium chloride infusion   Intravenous Continuous Omero Resendiz MD           Allergies:     Allergies   Allergen Reactions    Metformin Nausea And Vomiting, Nausea Only, Rash and Shortness Of Breath     Rash & nausea      Sertraline Anaphylaxis and Rash    Fluoxetine Other (See Comments)     Hearing Voices , weird feeling     Oxycodone-Acetaminophen Itching    Tape Evern Iliana Tape] Rash and Other (See Comments)     redness       Problem List:    Patient Active Problem List   Diagnosis Code    Bipolar disorder ([facility] [address] F31.9    Anxiety F41.9    Class 3 severe obesity due to excess calories with serious comorbidity and body mass index (BMI) of 45.0 to 49.9 in adult (HCC) E66.01, Z68.42    HTN (hypertension), benign I10    Insomnia G47.00    Mild intellectual disability F70    Migraine without aura and without status migrainosus, not intractable G43.009    PRISCILLA (obstructive sleep apnea) G47.33    Chronic eczematous otitis externa of both ears H60.8X3    Disorder of both eustachian tubes H69.93    Laryngopharyngeal reflux disease K21.9    Allergic rhinitis J30.9    Calcaneal spur M77.30    Carpal tunnel syndrome, left G56.02    Carpal tunnel syndrome, right G56.01    Cervicalgia M54.2    Cervico-occipital neuralgia M54.81    Hx of migraines Z86.69    Inflamed seborrheic keratosis L82.0    Iron deficiency anemia D50.9    Irritable bowel syndrome with both constipation and diarrhea K58.2    Low back pain with right-sided sciatica M54.41    Malaise and fatigue R53.81, R53.83   

## 2020-12-11 NOTE — BRIEF OP NOTE
Brief Postoperative Note - Full Note in Chart Review/Procedures tab       Patient: Mayra Gibbs  YOB: 1980  MRN: [id number]    Date of Procedure: 12/11/2020    Pre-Op Diagnosis:  Nausea with vomiting    Post-Op Diagnosis: Gastric antral erosions       Procedure(s):  EGD BIOPSY    Surgeon(s):  Joannie Osler, MD    Assistant:  * No surgical staff found *    Anesthesia: Monitor Anesthesia Care    Estimated Blood Loss (mL): Minimal    Complications: None    Specimens:   ID Type Source Tests Collected by Time Destination   A : antrum bx. Tissue Tissue SURGICAL PATHOLOGY Joannie Osler, MD 12/11/2020 6569        Implants:  * No implants in log *      Drains: * No LDAs found *    Findings:  Gastric antral erosions - biopsied. Rec:  1) Check pathology. 2) Would d/c diclofenac if possible. 3) Pantoprazole 40 mg daily  4) Follow up in office in 3 months.     Electronically signed by Joannie Osler, MD on 12/11/2020 at 9:28 AM

## 2020-12-16 ENCOUNTER — OFFICE VISIT (OUTPATIENT)
Dept: ORTHOPEDIC SURGERY | Age: 40
End: 2020-12-16
Payer: MEDICARE

## 2020-12-16 ENCOUNTER — TELEPHONE (OUTPATIENT)
Dept: ORTHOPEDIC SURGERY | Age: 40
End: 2020-12-16

## 2020-12-16 ENCOUNTER — PATIENT MESSAGE (OUTPATIENT)
Dept: PRIMARY CARE CLINIC | Age: 40
End: 2020-12-16

## 2020-12-16 VITALS — BODY MASS INDEX: 50.11 KG/M2 | WEIGHT: 265.4 LBS | HEIGHT: 61 IN | TEMPERATURE: 97.5 F

## 2020-12-16 PROCEDURE — G8484 FLU IMMUNIZE NO ADMIN: HCPCS | Performed by: PHYSICIAN ASSISTANT

## 2020-12-16 PROCEDURE — G8417 CALC BMI ABV UP PARAM F/U: HCPCS | Performed by: PHYSICIAN ASSISTANT

## 2020-12-16 PROCEDURE — 1036F TOBACCO NON-USER: CPT | Performed by: PHYSICIAN ASSISTANT

## 2020-12-16 PROCEDURE — G8427 DOCREV CUR MEDS BY ELIG CLIN: HCPCS | Performed by: PHYSICIAN ASSISTANT

## 2020-12-16 PROCEDURE — 99213 OFFICE O/P EST LOW 20 MIN: CPT | Performed by: PHYSICIAN ASSISTANT

## 2020-12-16 NOTE — PROGRESS NOTES
Laterality Date    BREAST REDUCTION SURGERY  05/07    Yimi Rojas ENDOMETRIAL ABLATION      LUMBAR SPINE SURGERY Bilateral 5/1/2019    BILATERAL L5 TRANSFORAMINAL EPIDURAL STEROID INJECTION WITH FLUOROSCOPY performed by Alen Dodd MD at [address] Left 11/1/2019    LEFT L5 AND S1 LUMBAR TRANSFORAMINAL EPIDURAL STEROID INJECTION WITH FLUOROSCOPY performed by Alen Dodd MD at [address] 12/11/2020    EGD BIOPSY performed by Kong Warren MD at [address]       Current Outpatient Medications   Medication Sig Dispense Refill    pantoprazole (PROTONIX) 40 MG tablet Take 1 tablet by mouth every morning (before breakfast) 30 tablet 5    ondansetron (ZOFRAN ODT) 4 MG disintegrating tablet Take 1 tablet by mouth every 8 hours as needed for Nausea 20 tablet 0    lisinopril-hydroCHLOROthiazide (PRINZIDE;ZESTORETIC) 20-12.5 MG per tablet Take 2 tablets by mouth daily 60 tablet 5    JANUVIA 100 MG tablet TAKE 1 TABLET BY MOUTH EVERY DAY 30 tablet 5    cariprazine hcl (VRAYLAR) 1.5 MG capsule Take 1.5 mg by mouth daily      baclofen (LIORESAL) 10 MG tablet Take 10-20 mg by mouth 3 times daily as needed      albuterol sulfate  (90 Base) MCG/ACT inhaler Inhale 2 puffs into the lungs every 6 hours as needed      Eptinezumab-jjmr 100 MG/ML SOLN Infuse 100 mg intravenously every 3 months       Melatonin 3 MG CAPS Take 1 to 2 tablets PO qhs PRN.  busPIRone (BUSPAR) 7.5 MG tablet Take 7.5 mg by mouth daily      Rimegepant Sulfate (NURTEC) 75 MG TBDP Take 75 mg by mouth      blood glucose monitor strips Test twice daily 100 strip 3    Lancets MISC 1 each by Does not apply route daily Diabetes Mellitus E11.9 100 each 3    LORazepam (ATIVAN) 0.5 MG tablet Take 0.5 mg by mouth.  blood glucose monitor kit and supplies Test 1 times a day & as needed for symptoms of irregular blood glucose.   Diabetes Mellitus E11.9 1 kit 0 The limb is warm and well perfused. Distal pulses intact. Capillary refill is intact. Edema: none. Venous stasis changes: none. Gait: Patient has a antalgic gait and is taking short strides. Radiology:  2 view right knee x-ray completed 11/25/2020 and reviewed in office today:    FINDINGS:   Osseous structures of the knee are intact and align normally. Mild medial   compartment joint space narrowing.  Faint lucency noted along the lateral   aspect of the medial femoral condyle typical of osteochondral defect. No   retained radiopaque foreign body.  No evidence of joint effusion. Impression:  Encounter Diagnosis   Name Primary?  Tear of medial meniscus of right knee, current, unspecified tear type, initial encounter Yes       Office Procedures:  Orders Placed This Encounter   Procedures    MRI KNEE RIGHT [address] [place] [address]     Standing Status:   Future     Standing Expiration Date:   12/16/2021       Treatment Plan:          There is concern for unrepaired or new medial meniscal pathology. Patient states she is continued to have pain since right knee arthroscopy in August of this year despite rest, ice, heat, anti-inflammatories, Tylenol, physical therapy and activity modification. She is still experiencing mechanical symptoms. We will repeat MRI to further evaluate. Shweta Lutz was informed of the results of any imaging. We discussed treatment options and a time was given to answer questions. A plan was proposed and Shweta Lutz understand and accepts this course of care. Electronically signed by Jacque Alexis PA-C on 86/79/5159  Board Certified [facility]    Please note that portions of this note were completed with a voice recognition program.  Efforts were made to edit the dictations but occasionally words are mis-transcribed.

## 2020-12-17 ENCOUNTER — TELEPHONE (OUTPATIENT)
Dept: BARIATRICS/WEIGHT MGMT | Age: 40
End: 2020-12-17

## 2020-12-17 NOTE — TELEPHONE ENCOUNTER
Pt was sent dr Elmira Callahan digital bariatric seminar    She DOES have BWLS coverage with [facility] (No req diet)     Spoke w/pt, info given. Pt verbalized understanding. Appt sched. pk mailed.  Per pt no prev wt loss sx, BMI >35

## 2020-12-18 NOTE — TELEPHONE ENCOUNTER
Called and spoke w/pt, she was already scheduled for MRI.   Scheduled f/u for CHRISTINA on 1/6/2020 @ 8:15am

## 2020-12-28 ENCOUNTER — TELEPHONE (OUTPATIENT)
Dept: ORTHOPEDIC SURGERY | Age: 40
End: 2020-12-28

## 2020-12-28 ENCOUNTER — HOSPITAL ENCOUNTER (OUTPATIENT)
Dept: MRI IMAGING | Age: 40
Discharge: HOME | End: 2020-12-28
Payer: MEDICARE

## 2020-12-28 PROCEDURE — 73721 MRI JNT OF LWR EXTRE W/O DYE: CPT

## 2021-01-06 ENCOUNTER — TELEPHONE (OUTPATIENT)
Dept: ORTHOPEDIC SURGERY | Age: 41
End: 2021-01-06

## 2021-01-06 DIAGNOSIS — M23.92 KNEE INTERNAL DERANGEMENT, LEFT: Primary | ICD-10-CM

## 2021-01-06 NOTE — TELEPHONE ENCOUNTER
General Question     Subject: She is wanting a recommendation on a hand doctor in our practice that does SX at the [facility] location    Patient and /or Facility Request: patient    Contact Number: [phone]

## 2021-01-06 NOTE — TELEPHONE ENCOUNTER
Lmom informing pt that there is not a hand surgeon in our office that operates in . She may go to another physician if she wishes.

## 2021-01-07 ENCOUNTER — TELEPHONE (OUTPATIENT)
Dept: ORTHOPEDIC SURGERY | Age: 41
End: 2021-01-07

## 2021-01-07 NOTE — TELEPHONE ENCOUNTER
LVM letting patient know that left knee MRI is authorized and she can call to schedule. Gave Roya OP scheduling's phone number. Also instructed to call and schedule FU appt with CHRISTINA for results.

## 2021-01-08 ENCOUNTER — TELEPHONE (OUTPATIENT)
Dept: ORTHOPEDIC SURGERY | Age: 41
End: 2021-01-08

## 2021-01-08 NOTE — TELEPHONE ENCOUNTER
Called and [facility] for pt to call back. Pt already scheduled for MRI on 1/14/2021. Need to schedule pt VV w/JDA to discuss results.

## 2021-01-13 ENCOUNTER — TELEPHONE (OUTPATIENT)
Dept: ORTHOPEDIC SURGERY | Age: 41
End: 2021-01-13

## 2021-01-13 NOTE — TELEPHONE ENCOUNTER
Other PATIENT CALLING STATING SHE IS RETURNING CALL TO [facility], [address].

## 2021-01-14 ENCOUNTER — HOSPITAL ENCOUNTER (OUTPATIENT)
Dept: MRI IMAGING | Age: 41
Discharge: HOME | End: 2021-01-14
Payer: MEDICARE

## 2021-01-14 DIAGNOSIS — M23.92 KNEE INTERNAL DERANGEMENT, LEFT: ICD-10-CM

## 2021-01-14 PROCEDURE — 73721 MRI JNT OF LWR EXTRE W/O DYE: CPT

## 2021-01-18 ENCOUNTER — TELEPHONE (OUTPATIENT)
Dept: ORTHOPEDIC SURGERY | Age: 41
End: 2021-01-18

## 2021-01-18 ENCOUNTER — OFFICE VISIT (OUTPATIENT)
Dept: ORTHOPEDIC SURGERY | Age: 41
End: 2021-01-18
Payer: MEDICARE

## 2021-01-18 ENCOUNTER — PATIENT MESSAGE (OUTPATIENT)
Dept: ORTHOPEDIC SURGERY | Age: 41
End: 2021-01-18

## 2021-01-18 DIAGNOSIS — E66.01 MORBID OBESITY WITH BMI OF 50.0-59.9, ADULT (HCC): ICD-10-CM

## 2021-01-18 DIAGNOSIS — M17.0 BILATERAL PRIMARY OSTEOARTHRITIS OF KNEE: Primary | ICD-10-CM

## 2021-01-18 PROCEDURE — G8417 CALC BMI ABV UP PARAM F/U: HCPCS | Performed by: ORTHOPAEDIC SURGERY

## 2021-01-18 PROCEDURE — G8427 DOCREV CUR MEDS BY ELIG CLIN: HCPCS | Performed by: ORTHOPAEDIC SURGERY

## 2021-01-18 PROCEDURE — G8484 FLU IMMUNIZE NO ADMIN: HCPCS | Performed by: ORTHOPAEDIC SURGERY

## 2021-01-18 PROCEDURE — 1036F TOBACCO NON-USER: CPT | Performed by: ORTHOPAEDIC SURGERY

## 2021-01-18 PROCEDURE — 99212 OFFICE O/P EST SF 10 MIN: CPT | Performed by: ORTHOPAEDIC SURGERY

## 2021-01-18 NOTE — TELEPHONE ENCOUNTER
Please call patient. She want to know name of doctor you want to refer her to for procedure and how to schedule.

## 2021-01-19 NOTE — TELEPHONE ENCOUNTER
General Question     Subject: Patient would like to know what provider she is being referred to for a procedure  Patient : Sheri Rm Number: [phone]

## 2021-01-19 NOTE — TELEPHONE ENCOUNTER
Called and spoke w/pt informed her that she was referred to Dr. Corin Treviño for [facility] procedure. Scheduled pt for 2/17/2021. Pt asked about the procedure in detail. Informed her that she would need to discuss that with Dr. Corin Treviño at her visit.

## 2021-01-22 ENCOUNTER — OFFICE VISIT (OUTPATIENT)
Dept: PRIMARY CARE CLINIC | Age: 41
End: 2021-01-22
Payer: MEDICARE

## 2021-01-22 VITALS
WEIGHT: 265 LBS | SYSTOLIC BLOOD PRESSURE: 128 MMHG | HEART RATE: 101 BPM | BODY MASS INDEX: 50.07 KG/M2 | TEMPERATURE: 97.7 F | OXYGEN SATURATION: 99 % | DIASTOLIC BLOOD PRESSURE: 80 MMHG

## 2021-01-22 DIAGNOSIS — H93.13 SUBJECTIVE TINNITUS OF BOTH EARS: ICD-10-CM

## 2021-01-22 DIAGNOSIS — E11.9 TYPE 2 DIABETES MELLITUS WITHOUT COMPLICATION, WITHOUT LONG-TERM CURRENT USE OF INSULIN (HCC): Chronic | ICD-10-CM

## 2021-01-22 DIAGNOSIS — D72.829 LEUKOCYTOSIS, UNSPECIFIED TYPE: ICD-10-CM

## 2021-01-22 DIAGNOSIS — G47.33 OSA (OBSTRUCTIVE SLEEP APNEA): ICD-10-CM

## 2021-01-22 DIAGNOSIS — I10 HTN (HYPERTENSION), BENIGN: Primary | Chronic | ICD-10-CM

## 2021-01-22 PROCEDURE — G8417 CALC BMI ABV UP PARAM F/U: HCPCS | Performed by: FAMILY MEDICINE

## 2021-01-22 PROCEDURE — G8484 FLU IMMUNIZE NO ADMIN: HCPCS | Performed by: FAMILY MEDICINE

## 2021-01-22 PROCEDURE — 1036F TOBACCO NON-USER: CPT | Performed by: FAMILY MEDICINE

## 2021-01-22 PROCEDURE — 2022F DILAT RTA XM EVC RTNOPTHY: CPT | Performed by: FAMILY MEDICINE

## 2021-01-22 PROCEDURE — G8427 DOCREV CUR MEDS BY ELIG CLIN: HCPCS | Performed by: FAMILY MEDICINE

## 2021-01-22 PROCEDURE — 3046F HEMOGLOBIN A1C LEVEL >9.0%: CPT | Performed by: FAMILY MEDICINE

## 2021-01-22 PROCEDURE — 99213 OFFICE O/P EST LOW 20 MIN: CPT | Performed by: FAMILY MEDICINE

## 2021-01-22 RX ORDER — MECLIZINE HYDROCHLORIDE 25 MG/1
25 TABLET ORAL 3 TIMES DAILY PRN
Qty: 30 TABLET | Refills: 0 | Status: SHIPPED | OUTPATIENT
Start: 2021-01-22 | End: 2021-02-01

## 2021-01-22 RX ORDER — DULAGLUTIDE 0.75 MG/.5ML
0.75 INJECTION, SOLUTION SUBCUTANEOUS WEEKLY
Qty: 4 PEN | Refills: 5 | Status: SHIPPED | OUTPATIENT
Start: 2021-01-22 | End: 2021-03-04

## 2021-01-25 ENCOUNTER — HOSPITAL ENCOUNTER (OUTPATIENT)
Dept: PHYSICAL THERAPY | Age: 41
Setting detail: THERAPIES SERIES
Discharge: HOME | End: 2021-01-25
Payer: MEDICARE

## 2021-01-25 NOTE — PROGRESS NOTES
Physical Therapy  Cancellation/No-show Note  Patient Name:  Carlin Krishna  :  1980   Date:  2021  Cancelled visits to date: 0  No-shows to date: 1  Patient status for today's appointment patient:  []  Cancelled  []  Rescheduled appointment  [x]  No-show 2021 (PT belle)   Reason given by patient:  []  Patient ill  []  Conflicting appointment  []  No transportation    []  Conflict with work  [x]  No reason given  []  Other:     Comments:      Phone call information:   []  Phone call made today to patient at _ time at number provided:      []  Patient answered, conversation as follows:    []  Patient did not answer, message left as follows:  [x]  Phone call not made today    Electronically signed by:  Ariane Kay PT

## 2021-02-02 DIAGNOSIS — G43.009 MIGRAINE WITHOUT AURA AND WITHOUT STATUS MIGRAINOSUS, NOT INTRACTABLE: Primary | ICD-10-CM

## 2021-02-03 DIAGNOSIS — G43.001 MIGRAINE WITHOUT AURA AND WITH STATUS MIGRAINOSUS, NOT INTRACTABLE: Primary | ICD-10-CM

## 2021-02-08 ENCOUNTER — TELEPHONE (OUTPATIENT)
Dept: BARIATRICS/WEIGHT MGMT | Age: 41
End: 2021-02-08

## 2021-02-12 ENCOUNTER — TELEPHONE (OUTPATIENT)
Dept: PULMONOLOGY | Age: 41
End: 2021-02-12

## 2021-02-12 NOTE — TELEPHONE ENCOUNTER
Contacted the patient for her Virtual Visit. Patient is not using the machine as she states the DME did not send her a mask.     Patient will call when she obtains a mask to make an appointment for 31 days after using the machine

## 2021-02-12 NOTE — TELEPHONE ENCOUNTER
Medication:   Requested Prescriptions     Pending Prescriptions Disp Refills    lisinopril-hydroCHLOROthiazide (PRINZIDE;ZESTORETIC) 20-12.5 MG per tablet [Pharmacy Med Name: LISINOPRIL-HCTZ 20-12.5 MG TAB] 90 tablet 1     Sig: TAKE 1 TABLET BY MOUTH EVERY DAY     Last Filled:  11/30/20    Last appt: 1/22/2021   Next appt: Visit date not found    Last OARRS:   RX Monitoring 9/13/2019   Attestation -   Periodic Controlled Substance Monitoring Possible medication side effects, risk of tolerance/dependence & alternative treatments discussed. ;No signs of potential drug abuse or diversion identified.    Chronic Pain > 80 MEDD -

## 2021-02-13 RX ORDER — LISINOPRIL AND HYDROCHLOROTHIAZIDE 20; 12.5 MG/1; MG/1
TABLET ORAL
Qty: 90 TABLET | Refills: 1 | Status: SHIPPED | OUTPATIENT
Start: 2021-02-13 | End: 2021-08-11

## 2021-02-15 PROBLEM — M17.0 BILATERAL PRIMARY OSTEOARTHRITIS OF KNEE: Status: ACTIVE | Noted: 2021-02-15

## 2021-02-15 NOTE — PROGRESS NOTES
HISTORY:   ORDERING SYSTEM PROVIDED HISTORY: Tear of medial meniscus of right knee,   current, unspecified tear type, initial encounter   TECHNOLOGIST PROVIDED HISTORY:   [facility]   Is the patient pregnant?->No   Reason for Exam: Patient having medial right knee pain. No known injury. Acuity: Unknown   Type of Exam: Ongoing       FINDINGS:   MENISCI: Medial and lateral menisci are intact.       CRUCIATE LIGAMENTS: The anterior and posterior cruciate ligaments are normal   in signal, morphology and course.       EXTENSOR MECHANISM: The quadriceps and patellar tendons are intact.  The   medial and lateral patellar retinacula are intact.       LATERAL COLLATERAL LIGAMENT COMPLEX: The popliteus tendon, biceps femoris   tendon, fibular collateral ligament and iliotibial band are intact.       MEDIAL COLLATERAL LIGAMENT COMPLEX: MCL is thickened and increased in T2   signal.  No retracted tear.       KNEE JOINT: Small joint effusion.  Full-thickness chondral loss at the   patellar apex with additional scattered grade 4 chondral fissures.  Prominent   subchondral edema at the patellar apex superiorly.  Grade 4 chondral loss   medial compartment most prominent along the medial femoral condyle where   there are multiple subchondral cysts and subchondral edema extending into the   medial femoral condyle.  More focal osteochondral injury of the medial   femoral condyle which measures approximately 5 x 6 mm.  Some curvilinear   sclerosis without fluid like signal to suggest unstable fragment.  Grade 3-4   chondral fissuring medial trochlea.       BONE MARROW: No acute fracture.  Prominent red marrow.           Impression   No meniscal tear.       Low-grade MCL injury.       Small joint effusion.       Prominent chondromalacia along the patellar apex and medial compartment with   subchondral cystic change in edema.  More prominent osteochondral injury   medial femoral condyle measuring 5 x 6 mm without findings to suggest   unstable fragment.         EXAMINATION:   MRI OF THE LEFT KNEE WITHOUT CONTRAST, 1/14/2021 7:25 am       TECHNIQUE:   Multiplanar multisequence MRI of the left knee was performed without the   administration of intravenous contrast.       COMPARISON:   Radiographs 11/01/2020       HISTORY:   ORDERING SYSTEM PROVIDED HISTORY: Knee internal derangement, left   TECHNOLOGIST PROVIDED HISTORY:   [facility]   Is the patient pregnant?->No   Reason for Exam: Left Knee Pain   Acuity: Unknown   Type of Exam: Ongoing       FINDINGS:   MENISCI: No lateral meniscus tear identified. Thuan Rain is a radial tear of the   medial meniscus posterior horn which extends towards the posterior root   attachment.  Mild extrusion of the medial meniscus body.       CRUCIATE LIGAMENTS: ACL and PCL are intact.       EXTENSOR MECHANISM: Patellar and distal quadriceps tendons are intact.  The   medial and lateral patellar retinaculum are intact.       LATERAL COLLATERAL LIGAMENT COMPLEX: Iliotibial band, fibular collateral   ligament, and biceps femoris tendon are intact.       MEDIAL COLLATERAL LIGAMENT COMPLEX: MCL is intact.       KNEE JOINT: Small-to-moderate size knee joint effusion.  No significant   Baker's cyst.  There is full-thickness cartilage loss at the lateral patellar   facet extending to the median ridge with subchondral reactive marrow changes. Mild cartilage thinning is seen at the trochlea.  Mild cartilage thinning is   seen at the medial femoral condyle.       BONE MARROW: No acute fracture or significant bone marrow edema.           Impression   Radial tear of the medial meniscus posterior horn which extends towards the   posterior root attachment.  Mild extrusion of the body.       Degenerative changes predominantly at the patellofemoral compartment where   there is full-thickness cartilage loss of the patella.       Small-to-moderate size knee joint effusion         ASSESSMENT/PLAN:  1.  Bilateral primary osteoarthritis of knee    2. Morbid obesity with BMI of 50.0-59.9, adult ([address]    We reviewed treatment options. Based on her MRI findings I would not recommend any further arthroscopic interventions. We need to address the stress on her joints and I have recommended a consultation with our weight management solutions team headed by Dr. Janet Garcia. Also in an effort to try to better control her description of aching and burning pain in her knees I have requested consultation with Dr. Piotr Pickett for potential cool leaf procedure. If she can obtain better pain control with radiofrequency ablation it could allow improved physical function and facilitate weight loss through the weight management process. She understands that unfortunately she is headed for early total joint arthroplasty if she continues on her current path. Another critical factor that needs to be closely monitored as her diabetes. I explained to her that blood glucose control is essential especially as she progresses to more surgical options to try to help reduce her risk of any perioperative complications including infection. Still, she is quite young and I have explained to her that any knee replacement at this point would ultimately lead to early failure. She is also not a candidate for any cartilage restoration procedures due to her weight. Amanda Palomares is a 36 y.o. female being evaluated by a Virtual Visit (video visit) encounter to address concerns as mentioned above. A caregiver was present when appropriate. Due to this being a TeleHealth encounter (During HGJWN-91 public health emergency), evaluation of the following organ systems was limited: Vitals/Constitutional/EENT/Resp/CV/GI//MS/Neuro/Skin/Heme-Lymph-Imm.   Pursuant to the emergency declaration under the [facility], [address] [place] [address] and the [employer] Act, this Virtual Visit was conducted with patient's (and/or legal guardian's) consent, to reduce the patient's risk of exposure to COVID-19 and provide necessary medical care. The patient (and/or legal guardian) has also been advised to contact this office for worsening conditions or problems, and seek emergency medical treatment and/or call 911 if deemed necessary. Patient identification was verified at the start of the visit: Yes    Total time spent on this encounter: 12 minutes    Services were provided through a video synchronous discussion virtually to substitute for in-person clinic visit. Patient and provider were located at their individual homes.

## 2021-02-24 ENCOUNTER — OFFICE VISIT (OUTPATIENT)
Dept: ORTHOPEDIC SURGERY | Age: 41
End: 2021-02-24
Payer: MEDICARE

## 2021-02-24 ENCOUNTER — TELEPHONE (OUTPATIENT)
Dept: ORTHOPEDIC SURGERY | Age: 41
End: 2021-02-24

## 2021-02-24 VITALS
SYSTOLIC BLOOD PRESSURE: 137 MMHG | HEART RATE: 101 BPM | HEIGHT: 61 IN | BODY MASS INDEX: 49.09 KG/M2 | DIASTOLIC BLOOD PRESSURE: 89 MMHG | WEIGHT: 260 LBS

## 2021-02-24 DIAGNOSIS — M17.11 PRIMARY OSTEOARTHRITIS OF RIGHT KNEE: Primary | ICD-10-CM

## 2021-02-24 DIAGNOSIS — M17.12 PRIMARY OSTEOARTHRITIS OF LEFT KNEE: ICD-10-CM

## 2021-02-24 PROCEDURE — 1036F TOBACCO NON-USER: CPT | Performed by: ORTHOPAEDIC SURGERY

## 2021-02-24 PROCEDURE — G8484 FLU IMMUNIZE NO ADMIN: HCPCS | Performed by: ORTHOPAEDIC SURGERY

## 2021-02-24 PROCEDURE — G8417 CALC BMI ABV UP PARAM F/U: HCPCS | Performed by: ORTHOPAEDIC SURGERY

## 2021-02-24 PROCEDURE — G8427 DOCREV CUR MEDS BY ELIG CLIN: HCPCS | Performed by: ORTHOPAEDIC SURGERY

## 2021-02-24 PROCEDURE — 99213 OFFICE O/P EST LOW 20 MIN: CPT | Performed by: ORTHOPAEDIC SURGERY

## 2021-02-24 NOTE — TELEPHONE ENCOUNTER
CPT: [id number], [id number]  BODY PART: right knee  AUTHORIZATION: pending    Submitted online with Georgie 2/24/21    Patient has decided against the [address].   Notified Georgie on 3/10/21

## 2021-02-24 NOTE — PROGRESS NOTES
Right                                         Left       Swelling moderate mild   Effusion trace neg   Ecchymosis neg neg   Errythemia neg neg   Warmth  neg neg   Deformity     Crepitus     Patellar Subluxation     Tenderness Medial joint and some lateral Medial , lateral jt, distal quad   Log Roll neg neg   Patellar Apprehension neg neg   Patellar Grind neg neg   Extension Lag slight slight     Neurovascular Status:  intact    Range of Motion Extension Flexion Pules (0-4) Dorsalis  Pedis Posterior  Tibialis   Right 5 105         Degrees Right 2+      Left   0 114         Degrees Left 2+        Test Including Ligamentous Stability/ Positive or Negative                                       Test          Right         Left   Valgus neg neg   Varus trace           trace                   Lachman neg neg   Anterior Drawer neg neg   Posterior Drawer neg neg   Scotty     Pivot Shift     Quadraceps Active     Atrophy         X-Ray Findings from Outside Source:   LT Knee done on 11/1/20 was reviewed and shows mild tricompartmental degenerative changes with small osteophytes in both medial and lateral compartments. She also has mild degenerative changes and osteophytic changes at the patellofemoral joint. RT Knee done on 11/25/20 was reviewed and shows similar osteophytic changes tricompartmental.  However on the right knee there is also noted to be a large OCD lesion in the medial femoral condyle. MRI Findings:   LT Knee done on 1/14/21 was reviewed a radial root tear of medial meniscus extending to the posterior root attachment. She has mild degenerative changes predominating in the patellofemoral compartment. RT Knee done on 12/28/20 was reviewed and likewise shows tricompartmental changes including chondromalacia and subchondral cystic changes with edema.   The most prominent finding however is a large osteochondral injury of the medial femoral condyle with associated chondral injury at the joint surface. Impression:   1. Grade 2K-L arthritis of right knee associated with large OCD lesion medial femoral condyle. 2.  Similar grade 2 K-L osteoarthritis of left knee predominating in patellofemoral joint. 3.  Failed arthroscopic surgery right knee and no improvement with steroid injections right knee. 4.  She is not a candidate for major surgery at this time because of her morbid obesity. Plan/Treatment:   1. Discussed the geniculate nerve ablation procedure with Dirk Zelaya. After discussion she desires to proceed starting with the right knee as it is the more symptomatic. 2.  She is aware that the procedure generally gives a year of partial relief of knee pain. This will hopefully give her enough time to get her weight down so that another procedure could be done if necessary. 3.  She has signed her papers and we will obtain preauthorization for the procedure. Elden Mcardle, MD  2/24/2021    This dictation was done with [employer] dictation and may contain mechanical errors related to translation.

## 2021-03-04 ENCOUNTER — PATIENT MESSAGE (OUTPATIENT)
Dept: FAMILY MEDICINE CLINIC | Age: 41
End: 2021-03-04

## 2021-03-04 ENCOUNTER — OFFICE VISIT (OUTPATIENT)
Dept: FAMILY MEDICINE CLINIC | Age: 41
End: 2021-03-04
Payer: MEDICARE

## 2021-03-04 VITALS
WEIGHT: 262 LBS | OXYGEN SATURATION: 99 % | SYSTOLIC BLOOD PRESSURE: 132 MMHG | TEMPERATURE: 97.5 F | DIASTOLIC BLOOD PRESSURE: 82 MMHG | HEART RATE: 105 BPM | BODY MASS INDEX: 49.5 KG/M2

## 2021-03-04 DIAGNOSIS — F41.9 ANXIETY: ICD-10-CM

## 2021-03-04 DIAGNOSIS — F33.1 MODERATE EPISODE OF RECURRENT MAJOR DEPRESSIVE DISORDER (HCC): ICD-10-CM

## 2021-03-04 DIAGNOSIS — G47.33 OSA (OBSTRUCTIVE SLEEP APNEA): Chronic | ICD-10-CM

## 2021-03-04 DIAGNOSIS — E66.01 CLASS 3 SEVERE OBESITY DUE TO EXCESS CALORIES WITHOUT SERIOUS COMORBIDITY WITH BODY MASS INDEX (BMI) OF 45.0 TO 49.9 IN ADULT (HCC): ICD-10-CM

## 2021-03-04 DIAGNOSIS — Z76.89 ENCOUNTER TO ESTABLISH CARE WITH NEW DOCTOR: Primary | ICD-10-CM

## 2021-03-04 DIAGNOSIS — E11.9 TYPE 2 DIABETES MELLITUS WITHOUT COMPLICATION, WITHOUT LONG-TERM CURRENT USE OF INSULIN (HCC): ICD-10-CM

## 2021-03-04 DIAGNOSIS — G43.009 MIGRAINE WITHOUT AURA AND WITHOUT STATUS MIGRAINOSUS, NOT INTRACTABLE: ICD-10-CM

## 2021-03-04 DIAGNOSIS — I10 HTN (HYPERTENSION), BENIGN: Chronic | ICD-10-CM

## 2021-03-04 DIAGNOSIS — F31.76 BIPOLAR DISORDER, IN FULL REMISSION, MOST RECENT EPISODE DEPRESSED (HCC): Chronic | ICD-10-CM

## 2021-03-04 DIAGNOSIS — F70 MILD INTELLECTUAL DISABILITY: ICD-10-CM

## 2021-03-04 PROCEDURE — G8417 CALC BMI ABV UP PARAM F/U: HCPCS | Performed by: NURSE PRACTITIONER

## 2021-03-04 PROCEDURE — G8427 DOCREV CUR MEDS BY ELIG CLIN: HCPCS | Performed by: NURSE PRACTITIONER

## 2021-03-04 PROCEDURE — 1036F TOBACCO NON-USER: CPT | Performed by: NURSE PRACTITIONER

## 2021-03-04 PROCEDURE — 99213 OFFICE O/P EST LOW 20 MIN: CPT | Performed by: NURSE PRACTITIONER

## 2021-03-04 PROCEDURE — G8484 FLU IMMUNIZE NO ADMIN: HCPCS | Performed by: NURSE PRACTITIONER

## 2021-03-04 PROCEDURE — 3046F HEMOGLOBIN A1C LEVEL >9.0%: CPT | Performed by: NURSE PRACTITIONER

## 2021-03-04 PROCEDURE — 2022F DILAT RTA XM EVC RTNOPTHY: CPT | Performed by: NURSE PRACTITIONER

## 2021-03-04 RX ORDER — BUSPIRONE HYDROCHLORIDE 10 MG/1
10 TABLET ORAL 2 TIMES DAILY
COMMUNITY
End: 2022-02-15 | Stop reason: ALTCHOICE

## 2021-03-04 RX ORDER — TRAZODONE HYDROCHLORIDE 50 MG/1
TABLET ORAL
COMMUNITY
Start: 2021-01-13 | End: 2021-12-10 | Stop reason: ALTCHOICE

## 2021-03-04 ASSESSMENT — ENCOUNTER SYMPTOMS
SHORTNESS OF BREATH: 0
DIARRHEA: 0
COUGH: 0
NAUSEA: 0
VOMITING: 0

## 2021-03-04 NOTE — PROGRESS NOTES
Derrek Camacho  : 1980  Encounter date: 3/4/2021    This is a 36 y.o. female who presents with  Chief Complaint   Patient presents with    New Patient     Hx of HTN and diabetes. History of present illness:    HPI   1. Presents to clinic today to establish care with me. Previous PCP will be retiring - last office visit 2 months prior. Did have routine blood work a few months prior. Is treated for multiple chronic conditions. Lives with her sister and brother in law. Has a developmental disability and works with [employer]. GERD  Takes Protonix. Per patient still has some flares a few times per week. Has been taking for the past 3 months. Takes after breakfast and with all other morning medications. Denies any difficulty swallowing. HTN  Takes Lisinopril/HCTZ. Per patient can be elevated at times. Reports as high as in the 170s/80s-100s. Denies any chest pain, heart palpitations, SOB w/exertion, leg swelling or headaches. Depression/Anxiety/Bipolar  Has a learning disability - works with [employer]. Follows with a psychiatrist - treated with trazodone, buspar, Vraylar and PRN Ativan. Well controlled currently. Migraines  Follows with Neurology - uses Nurtec as needed and does a injection of Eptinezumab-[facility] every three months. Well controlled. Does still suffer with Migraines twice weekly - previously was 3-4 times per week. Has new patient appointment with Dr. Princess Lopez 3/30/21. T2DM  Currently diet managed - no medications. Last A1C 7.3 in 2020. Doesn't check blood sugar readings at home. Will have an appointment with Dr. Kirk Anderson next week for weight loss solutions. PRISCILLA  Follows with Pulmonology - wears CPAP every single night. Follows with Ortho for right knee issues. Also has an appointment with Dr. Valarie Crane for right hand.      Allergies   Allergen Reactions    Metformin Nausea And Vomiting, Nausea Only, Rash and Shortness Of Breath     Rash & nausea      Sertraline Anaphylaxis and Rash    Fluoxetine Other (See Comments)     Hearing Voices , weird feeling     Oxycodone-Acetaminophen Itching    Tape [place] Tape] Rash and Other (See Comments)     redness     Current Outpatient Medications   Medication Sig Dispense Refill    traZODone (DESYREL) 50 MG tablet TAKE 1 TABLET BY MOUTH AT BEDTIME AS NEEDED      busPIRone (BUSPAR) 10 MG tablet Take 10 mg by mouth 2 times daily      lisinopril-hydroCHLOROthiazide (PRINZIDE;ZESTORETIC) 20-12.5 MG per tablet TAKE 1 TABLET BY MOUTH EVERY DAY 90 tablet 1    pantoprazole (PROTONIX) 40 MG tablet Take 1 tablet by mouth every morning (before breakfast) 30 tablet 5    ondansetron (ZOFRAN ODT) 4 MG disintegrating tablet Take 1 tablet by mouth every 8 hours as needed for Nausea 20 tablet 0    cariprazine hcl (VRAYLAR) 1.5 MG capsule Take 1.5 mg by mouth daily      albuterol sulfate  (90 Base) MCG/ACT inhaler Inhale 2 puffs into the lungs every 6 hours as needed      Eptinezumab-jjmr 100 MG/ML SOLN Infuse 100 mg intravenously every 3 months       Melatonin 3 MG CAPS Take 1 to 2 tablets PO qhs PRN.  Rimegepant Sulfate (NURTEC) 75 MG TBDP Take 75 mg by mouth as needed       blood glucose monitor strips Test twice daily 100 strip 3    Lancets MISC 1 each by Does not apply route daily Diabetes Mellitus E11.9 100 each 3    LORazepam (ATIVAN) 0.5 MG tablet Take 0.5 mg by mouth as needed.  blood glucose monitor kit and supplies Test 1 times a day & as needed for symptoms of irregular blood glucose. Diabetes Mellitus E11.9 1 kit 0     No current facility-administered medications for this visit. Review of Systems   Constitutional: Negative for activity change, appetite change, chills, fatigue and fever. Respiratory: Negative for cough and shortness of breath. Cardiovascular: Negative for chest pain and palpitations. Gastrointestinal: Negative for diarrhea, nausea and vomiting.      Past medical, surgical, family and social history were reviewed and updated with the patient. Objective:    /82 (Site: Left Upper Arm, Position: Sitting, Cuff Size: Large Adult)   Pulse 105   Temp 97.5 °F (36.4 °C)   Wt 262 lb (118.8 kg)   SpO2 99%   BMI 49.50 kg/m²   Weight: 262 lb (118.8 kg)     BP Readings from Last 3 Encounters:   03/04/21 132/82   02/24/21 137/89   01/22/21 128/80     Wt Readings from Last 3 Encounters:   03/04/21 262 lb (118.8 kg)   02/24/21 260 lb (117.9 kg)   01/22/21 265 lb (120.2 kg)     Physical Exam  Constitutional:       General: She is not in acute distress. Appearance: She is well-developed. She is morbidly obese. HENT:      Head: Normocephalic and atraumatic. Cardiovascular:      Rate and Rhythm: Normal rate and regular rhythm. Heart sounds: Normal heart sounds, S1 normal and S2 normal.   Pulmonary:      Effort: Pulmonary effort is normal. No respiratory distress. Breath sounds: Normal breath sounds. Skin:     General: Skin is warm and dry. Neurological:      Mental Status: She is alert and oriented to person, place, and time. Psychiatric:         Thought Content: Thought content normal.         Judgment: Judgment normal.       Assessment/Plan    1. Encounter to establish care with new doctor  Brief review of medical records available to me. Follow up in 3 months for diabetes. 2. Mild intellectual disability    3. HTN (hypertension), benign  B/P reading in office very good. Would like patient to bring her monitor in to next office visit since she is getting such high readings at home. Otherwise continue on current medication regimen. 4. Bipolar disorder, in full remission, most recent episode depressed ([address]  Continue follow up with Psych. 5. Anxiety  Continue follow up with Psych. 6. Moderate episode of recurrent major depressive disorder ([address]  Continue follow up with Psych.     7. PRISCILLA (obstructive sleep apnea)  Continue follow up with Sleep Medicine. 8. Migraine without aura and without status migrainosus, not intractable  Continue follow up with Neurology. 9. Type 2 diabetes mellitus without complication, without long-term current use of insulin ([facility] [address]  Due for A1C - complete blood work after seeing Dr. Sb Richards to see if he will have add ons. Return in 3 months for continued monitoring. 10. Class 3 severe obesity due to excess calories without serious comorbidity with body mass index (BMI) of 45.0 to 49.9 in adult [facility])  Keep follow up appointment with Dr. Sb Richards. Moo Aranda was counseled regarding symptoms of current diagnosis, course and complications of disease if inadequately treated. Discussed side effects of medications, diagnosis, treatment options, and prognosis along with risks, benefits, complications, and alternatives of treatment including labs, imaging and other studies/treatment targets and goals. She verbalized understanding of instructions and counseling. Return in about 3 months (around 6/4/2021) for Diabetes. Medical decision making of moderate complexity.

## 2021-03-04 NOTE — TELEPHONE ENCOUNTER
From: Michoacano Ruiz  To:  Serenity Laughlin, TIARA - CNP  Sent: 3/4/2021 11:34 AM EST  Subject: Non-Urgent Medical Question    I seen on my chart bob do for some shots and my annual.wellness visit do I need to set up an appointment or wait for my 3 months

## 2021-03-07 ENCOUNTER — PATIENT MESSAGE (OUTPATIENT)
Dept: FAMILY MEDICINE CLINIC | Age: 41
End: 2021-03-07

## 2021-03-08 ENCOUNTER — PATIENT MESSAGE (OUTPATIENT)
Dept: ORTHOPEDIC SURGERY | Age: 41
End: 2021-03-08

## 2021-03-08 ENCOUNTER — TELEPHONE (OUTPATIENT)
Dept: BARIATRICS/WEIGHT MGMT | Age: 41
End: 2021-03-08

## 2021-03-08 DIAGNOSIS — M17.11 PRIMARY OSTEOARTHRITIS OF RIGHT KNEE: Primary | ICD-10-CM

## 2021-03-08 DIAGNOSIS — M17.12 PRIMARY OSTEOARTHRITIS OF LEFT KNEE: ICD-10-CM

## 2021-03-08 NOTE — TELEPHONE ENCOUNTER
From: Dari Page  To:  TIARA Parkinson - JOE  Sent: 3/7/2021 7:58 AM EST  Subject: Non-Urgent Medical Question    Is there anyway you can referral me to pain management for my knee bob in alot of pain with them and I need to see also if I can get in mri done of my lower back cause I have trouble with it hurting me

## 2021-03-08 NOTE — TELEPHONE ENCOUNTER
From: Mini Evangelista  To: Evgeny Bar MD  Sent: 3/8/2021 8:55 AM EST  Subject: Non-Urgent Medical Question    Is there anyway you can referral me to pain management for my knee

## 2021-03-08 NOTE — TELEPHONE ENCOUNTER
Called as a new pt courtesy call - spoke w patient. Did receive paperwork - told patient to have new pt paperwork completely filled out, insurance card, and id and to arrive at appt time. If they didn't have the paperwork filled out and arrive on time may be rescheduled.  Told to arrive @ 800

## 2021-03-09 ENCOUNTER — TELEPHONE (OUTPATIENT)
Dept: ORTHOPEDIC SURGERY | Age: 41
End: 2021-03-09

## 2021-03-09 ENCOUNTER — OFFICE VISIT (OUTPATIENT)
Dept: BARIATRICS/WEIGHT MGMT | Age: 41
End: 2021-03-09
Payer: MEDICARE

## 2021-03-09 VITALS
RESPIRATION RATE: 18 BRPM | DIASTOLIC BLOOD PRESSURE: 77 MMHG | HEIGHT: 61 IN | WEIGHT: 263.4 LBS | SYSTOLIC BLOOD PRESSURE: 118 MMHG | BODY MASS INDEX: 49.73 KG/M2 | HEART RATE: 108 BPM | OXYGEN SATURATION: 98 %

## 2021-03-09 DIAGNOSIS — M17.11 PRIMARY OSTEOARTHRITIS OF RIGHT KNEE: Primary | ICD-10-CM

## 2021-03-09 DIAGNOSIS — E78.2 MIXED HYPERLIPIDEMIA: ICD-10-CM

## 2021-03-09 DIAGNOSIS — K21.9 CHRONIC GERD: ICD-10-CM

## 2021-03-09 DIAGNOSIS — M17.12 PRIMARY OSTEOARTHRITIS OF LEFT KNEE: ICD-10-CM

## 2021-03-09 DIAGNOSIS — E66.01 MORBID OBESITY WITH BMI OF 45.0-49.9, ADULT (HCC): Primary | ICD-10-CM

## 2021-03-09 DIAGNOSIS — E66.9 DIABETES MELLITUS TYPE 2 IN OBESE (HCC): ICD-10-CM

## 2021-03-09 DIAGNOSIS — E11.69 DIABETES MELLITUS TYPE 2 IN OBESE (HCC): ICD-10-CM

## 2021-03-09 DIAGNOSIS — G47.33 OBSTRUCTIVE SLEEP APNEA: ICD-10-CM

## 2021-03-09 DIAGNOSIS — Z01.818 PREOPERATIVE CLEARANCE: ICD-10-CM

## 2021-03-09 DIAGNOSIS — I10 ESSENTIAL HYPERTENSION: ICD-10-CM

## 2021-03-09 PROCEDURE — G8417 CALC BMI ABV UP PARAM F/U: HCPCS | Performed by: SURGERY

## 2021-03-09 PROCEDURE — G8427 DOCREV CUR MEDS BY ELIG CLIN: HCPCS | Performed by: SURGERY

## 2021-03-09 PROCEDURE — 2022F DILAT RTA XM EVC RTNOPTHY: CPT | Performed by: SURGERY

## 2021-03-09 PROCEDURE — 3046F HEMOGLOBIN A1C LEVEL >9.0%: CPT | Performed by: SURGERY

## 2021-03-09 PROCEDURE — 99205 OFFICE O/P NEW HI 60 MIN: CPT | Performed by: SURGERY

## 2021-03-09 PROCEDURE — 1036F TOBACCO NON-USER: CPT | Performed by: SURGERY

## 2021-03-09 PROCEDURE — G8484 FLU IMMUNIZE NO ADMIN: HCPCS | Performed by: SURGERY

## 2021-03-09 NOTE — PATIENT INSTRUCTIONS
Patient received dietary handouts and education. Pre-operative work up Ordered:    - [employer]. - Psych Evaluation.   - Cardiac Clearance. - CPAP Compliance. - EGD (Upper Endoscopy). - Support Group Attendance. - Obtain letter of medical necessity (PCP Letter). - Quit Smoking,  Alcohol, Caffeine and Carbonated Drinks  - Obtain records for Weight History 2 yrs. - Start Regular Exercise and track your activities. - Start Tracking your food Intake and follow dietary guidelines. - Avoid Pregnancy for 2 yrs from date of surgery. (for female patients in childbearing age)  - F/U in 4 weeks. Patient advised that its their responsibility to follow up for studies, referrals and/or labs ordered today.
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
## 2021-03-09 NOTE — Clinical Note
Greatly appreciate the referral.  Excellent candidate for weight loss. We will keep you posted on Leonides wynne.

## 2021-03-09 NOTE — PROGRESS NOTES
Ty Reyes is a 36 y.o. female with a date of birth of 1980. Vitals:    03/09/21 0834   BP: 118/77   Pulse:    Resp:    SpO2:     BMI: Body mass index is 49.77 kg/m². Obesity Classification: Class III    Weight History: Wt Readings from Last 3 Encounters:   03/09/21 263 lb 6.4 oz (119.5 kg)   03/04/21 262 lb (118.8 kg)   02/24/21 260 lb (117.9 kg)       Patient's lowest adult weight was 225 lbs at age 25. Patient's highest adult weight was 263 lbs at age 36. Patient has participated in the following weight loss programs: low carb diet. Patient has not participated in meal replacement/liquid diets. Patient has not participated in weight loss medications. Patient is not lactose intolerant. Patient does not have Muslim/cultural food concerns. Patient does not have food allergies. Patient does tolerate artificial sweeteners. Pt has a learning disability. Pt reports okay sleep schedule. Pt with PRISCILLA, wears CPAP consistently. Typically eating 4-5x day. 24 hour recall/food frequency chart:  Breakfast: most of time - eggs with toast / Argie Cipro or [address] with 1% / corn beef hash + soft drink  Snack: yes. - Little Debbies / cookies / ice cream / cake / brownies  Lunch: yes. - Bologna and cheese sandwich with chips  Snack: yes. - same as above  Dinner: yes. - hamburger with tater tots or mac n cheese / spaghetti with garlic bread  Snack: yes. - same as above  Drinks throughout the day: Gatorade zero / water / soft drink  Do you drink alcohol? No.     Patient does meet the criteria for binge eating disorder. Patient does have grazing. Patient does occasionally have night eating. Patient does have a history of emotional eating or eating out of boredom. Exercise - walking 2-3x week for about 60min    Surgery  Patient does feel confident in her ability to make these changes. The patient's expectations of post-surgical eating habits are reasonable.     Patient states she does understand the consequences of not complying with post-op food guidelines. Patient states she does understands the long term changes in food intake that will be necessary for all occasions after surgery for the rest of her life. Patient is deemed nutritionally appropriate to proceed. Goals  Weight: 200#  Health Improvement: overall feel better, increase energy, improved knee pain, improved sx of chronic dz    Assessment  Nutritional Needs: RMR=(9.99 x 119.5) + (6.25 x 154.9) - (4.92 x 40 y.o.) -161  = 1804 kcal x 1.4 (light/sedentary activity factor)= 2525 kcal - 1000 (for 2 lb weight loss/week)= 1525 kcal.    Plan  Plan/Recommendations: Start presurgical guidelines. Goals:   -Eat 4-5 times daily  -Avoid high fat and high sugar foods  -Include protein with all meals and snacks  -Avoid carbonation and caffeine  -Avoid calorie containing beverages  -Increase physical activity as tolerated    Handouts: Frozen Meals, Protein Bars, 9\" Plate    PES Statement:  Overweight/Obesity related to lack of exercise, sedentary lifestyle, unhealthy eating habits, and unsuccessful diet attempts as evidenced by BMI. Body mass index is 49.77 kg/m². Will follow up as necessary.     [address]

## 2021-03-10 ENCOUNTER — TELEPHONE (OUTPATIENT)
Dept: PRIMARY CARE CLINIC | Age: 41
End: 2021-03-10

## 2021-03-10 NOTE — TELEPHONE ENCOUNTER
----- Message from Radha Cardona sent at 3/9/2021 10:57 AM EST -----  Subject: Message to Provider    QUESTIONS  Information for Provider? Patient is trying to schedule weight loss   surgery. She needs some paperwork filled out before her next appointment. She would like to know if she is able to bring this to the office to be   filled out or does she need an appointment.   ---------------------------------------------------------------------------  --------------  [address]  What is the best way for the office to contact you? OK to leave message on   voicemail  Preferred Call Back Phone Number? [phone]  ---------------------------------------------------------------------------  --------------  SCRIPT ANSWERS  Relationship to Patient?  Self

## 2021-03-11 ENCOUNTER — TELEPHONE (OUTPATIENT)
Dept: BARIATRICS/WEIGHT MGMT | Age: 41
End: 2021-03-11

## 2021-03-11 NOTE — TELEPHONE ENCOUNTER
I spoke with patient  She just wants to go to pain management and not do the [facility]. I gave her Dr Lucille Zepeda number.

## 2021-03-15 NOTE — TELEPHONE ENCOUNTER
Patient returned call for Dr. Andrei BENDER - switching from surgical.  Scheduled her on 4/20/21 for 1st VV. Explained she needed to verify with insurance. Mailed out consents/nsfa / was seen by MID for new pt appt. In March. Informed pt that MA would call if needed anything further before visit. [facility]

## 2021-03-16 RX ORDER — AZELASTINE 1 MG/ML
SPRAY, METERED NASAL
Qty: 1 BOTTLE | Refills: 2 | Status: SHIPPED | OUTPATIENT
Start: 2021-03-16 | End: 2021-03-29

## 2021-03-26 ENCOUNTER — OFFICE VISIT (OUTPATIENT)
Dept: PRIMARY CARE CLINIC | Age: 41
End: 2021-03-26
Payer: MEDICARE

## 2021-03-26 DIAGNOSIS — Z20.828 EXPOSURE TO SARS-ASSOCIATED CORONAVIRUS: Primary | ICD-10-CM

## 2021-03-26 LAB — SARS-COV-2: NOT DETECTED

## 2021-03-26 PROCEDURE — G8428 CUR MEDS NOT DOCUMENT: HCPCS | Performed by: NURSE PRACTITIONER

## 2021-03-26 PROCEDURE — 99211 OFF/OP EST MAY X REQ PHY/QHP: CPT | Performed by: NURSE PRACTITIONER

## 2021-03-26 PROCEDURE — G8417 CALC BMI ABV UP PARAM F/U: HCPCS | Performed by: NURSE PRACTITIONER

## 2021-04-08 PROBLEM — Z01.818 PREOPERATIVE CLEARANCE: Status: RESOLVED | Noted: 2021-03-09 | Resolved: 2021-04-08

## 2021-04-16 PROBLEM — Z01.810 PREOP CARDIOVASCULAR EXAM: Status: ACTIVE | Noted: 2021-04-16

## 2021-05-16 PROBLEM — Z01.810 PREOP CARDIOVASCULAR EXAM: Status: RESOLVED | Noted: 2021-04-16 | Resolved: 2021-05-16

## 2021-06-02 ENCOUNTER — CLINICAL DOCUMENTATION (OUTPATIENT)
Dept: OTHER | Age: 41
End: 2021-06-02

## 2021-11-29 ENCOUNTER — OFFICE VISIT (OUTPATIENT)
Dept: ORTHOPEDIC SURGERY | Age: 41
End: 2021-11-29
Payer: MEDICARE

## 2021-11-29 VITALS — WEIGHT: 265 LBS | HEIGHT: 61 IN | BODY MASS INDEX: 50.03 KG/M2

## 2021-11-29 DIAGNOSIS — M17.11 PRIMARY OSTEOARTHRITIS OF RIGHT KNEE: Primary | ICD-10-CM

## 2021-11-29 PROCEDURE — 99213 OFFICE O/P EST LOW 20 MIN: CPT | Performed by: PHYSICIAN ASSISTANT

## 2021-11-29 NOTE — PROGRESS NOTES
Patient Name: Brittany Parker  Medical Record Number: [id number]  YOB: 1980  Date of Encounter: 11/29/2021     Chief Complaint   Patient presents with    Follow-up     Right knee pain, saw Dr Iesha Kuo at [facility] who ordered gel injections and she wants to hold off on those until she gets another opinion. History of Present Illness:   Ms. Brittany Parker is here in follow up regarding her chronic right knee pain. Patient had a right knee diagnostic video arthroscopy with partial meniscectomy, chondroplasty as well as microfracture repair with Dr. Iesha Kuo in August 2020. She states that the surgery did not help as she has continued to have chronic right knee pain. Patient has had a known osteochondral defect within the weightbearing portion of the medial femoral condyle. She has since followed back up with Dr. Iesha Kuo and had a repeat MRI of her right knee completed. She states she was told at her visit earlier this month that Dr. Iesha Kuo was ordering joint fluid injections. Patient presents today stating Dr. Iesha Kuo is leaving his practice and she would like to reestablish with [facility] orthopedics    The patient's past medical history, medications, allergies, family history, social history, and review of systems have been reviewed, and dated and are recorded in the chart under the 'MEDIA\" tab. Physical Exam:    Ms. Brittany Parker appears well, she is in no apparent distress, she demonstrates appropriate mood & affect. She is alert and oriented to person, place and time. Ht 5' 1\" (1.549 m)   Wt 265 lb (120.2 kg)   BMI 50.07 kg/m²     On examination of patient's right knee there is no obvious swelling or joint effusion. She has tenderness on palpation along the medial and lateral joint lines.   Range of motion is 0 to 115 degrees with 4/5 motor strength        Radiology:  X-rays obtained and reviewed in office:   Views: 4 view right knee including AP, tunnel, lateral and sunrise views  Impression: There are no obvious fractures. There are only mild arthritic changes. There is a subchondral cyst within the medial femoral condyle      Right knee MRI completed 10/26/21:    1.  Progression of the chronic osteochondral injury in the weightbearing medial femoral condyle with development of large subchondral cyst surrounding reactive signal, worsening of overlying full-thickness defect and subchondral bone irregularity. 2.  Slight progression of high-grade chondral loss and subchondral reactive changes in the patella. 3.  New small radial tear within the free edge of the medial meniscus body. 4.  Knee effusion and synovitis and/or debris within the joint. No definite measurable loose body. 5.  No significant change in the somewhat nodular geographic regions of marrow signal alteration, which may be keeping with red marrow reconversion. Correlate with laboratory studies to exclude myeloproliferative disease if not already pursued. Orders  Orders Placed This Encounter   Procedures    XR KNEE BILATERAL STANDING     Standing Status:   Future     Number of Occurrences:   1     Standing Expiration Date:   12/29/2021    XR KNEE RIGHT (3 VIEWS)     Standing Status:   Future     Number of Occurrences:   1     Standing Expiration Date:   12/29/2021       Impression:   Diagnosis Orders   1. Primary osteoarthritis of right knee  XR KNEE BILATERAL STANDING    XR KNEE RIGHT (3 VIEWS)       Treatment Plan:    Patient presented today in follow-up regarding her chronic right knee pain. She had a repeat MRI with results documented above. I feel would be best at this time for patient to follow-up with Dr. Edgard Douglass. Carmencita Schilder was informed of the results of any imaging. We discussed treatment options and a time was given to answer questions. A plan was proposed and Carmencita Schilder understand and accepts this course of care.           Electronically signed by Shane Dash PA-C on 70/15/8468  Board Certified [facility]    Please note that portions of this note were completed with a voice recognition program.  Efforts were made to edit the dictations but occasionally words are mis-transcribed.

## 2021-12-02 ENCOUNTER — OFFICE VISIT (OUTPATIENT)
Dept: ORTHOPEDIC SURGERY | Age: 41
End: 2021-12-02
Payer: MEDICARE

## 2021-12-02 VITALS — HEIGHT: 61 IN | WEIGHT: 256 LBS | BODY MASS INDEX: 48.33 KG/M2

## 2021-12-02 DIAGNOSIS — E66.01 MORBID OBESITY WITH BMI OF 45.0-49.9, ADULT (HCC): Primary | ICD-10-CM

## 2021-12-02 PROCEDURE — 99215 OFFICE O/P EST HI 40 MIN: CPT | Performed by: ORTHOPAEDIC SURGERY

## 2021-12-10 ENCOUNTER — OFFICE VISIT (OUTPATIENT)
Dept: PRIMARY CARE CLINIC | Age: 41
End: 2021-12-10
Payer: MEDICARE

## 2021-12-10 ENCOUNTER — TELEPHONE (OUTPATIENT)
Dept: PRIMARY CARE CLINIC | Age: 41
End: 2021-12-10

## 2021-12-10 VITALS
HEIGHT: 61 IN | HEART RATE: 97 BPM | SYSTOLIC BLOOD PRESSURE: 122 MMHG | BODY MASS INDEX: 49.2 KG/M2 | OXYGEN SATURATION: 98 % | DIASTOLIC BLOOD PRESSURE: 70 MMHG | WEIGHT: 260.6 LBS

## 2021-12-10 DIAGNOSIS — Z23 NEED FOR HEPATITIS B VACCINATION: ICD-10-CM

## 2021-12-10 DIAGNOSIS — Z23 NEED FOR CHICKENPOX VACCINATION: ICD-10-CM

## 2021-12-10 DIAGNOSIS — E11.9 DIABETES MELLITUS, TYPE II, INSULIN DEPENDENT (HCC): Primary | ICD-10-CM

## 2021-12-10 DIAGNOSIS — Z79.4 DIABETES MELLITUS, TYPE II, INSULIN DEPENDENT (HCC): Primary | ICD-10-CM

## 2021-12-10 DIAGNOSIS — I10 ESSENTIAL HYPERTENSION, BENIGN: ICD-10-CM

## 2021-12-10 DIAGNOSIS — G47.33 OSA (OBSTRUCTIVE SLEEP APNEA): ICD-10-CM

## 2021-12-10 PROCEDURE — 90746 HEPB VACCINE 3 DOSE ADULT IM: CPT | Performed by: FAMILY MEDICINE

## 2021-12-10 PROCEDURE — 1036F TOBACCO NON-USER: CPT | Performed by: FAMILY MEDICINE

## 2021-12-10 PROCEDURE — G8482 FLU IMMUNIZE ORDER/ADMIN: HCPCS | Performed by: FAMILY MEDICINE

## 2021-12-10 PROCEDURE — 90716 VAR VACCINE LIVE SUBQ: CPT | Performed by: FAMILY MEDICINE

## 2021-12-10 PROCEDURE — G0010 ADMIN HEPATITIS B VACCINE: HCPCS | Performed by: FAMILY MEDICINE

## 2021-12-10 PROCEDURE — G8417 CALC BMI ABV UP PARAM F/U: HCPCS | Performed by: FAMILY MEDICINE

## 2021-12-10 PROCEDURE — 2022F DILAT RTA XM EVC RTNOPTHY: CPT | Performed by: FAMILY MEDICINE

## 2021-12-10 PROCEDURE — G8427 DOCREV CUR MEDS BY ELIG CLIN: HCPCS | Performed by: FAMILY MEDICINE

## 2021-12-10 PROCEDURE — 3046F HEMOGLOBIN A1C LEVEL >9.0%: CPT | Performed by: FAMILY MEDICINE

## 2021-12-10 PROCEDURE — 99204 OFFICE O/P NEW MOD 45 MIN: CPT | Performed by: FAMILY MEDICINE

## 2021-12-10 PROCEDURE — 90471 IMMUNIZATION ADMIN: CPT | Performed by: FAMILY MEDICINE

## 2021-12-10 RX ORDER — ESCITALOPRAM OXALATE 5 MG/1
5 TABLET ORAL DAILY
COMMUNITY
End: 2022-08-26

## 2021-12-10 RX ORDER — HYDROXYZINE PAMOATE 50 MG/1
50 CAPSULE ORAL 3 TIMES DAILY PRN
COMMUNITY
End: 2022-02-15 | Stop reason: ALTCHOICE

## 2021-12-10 RX ORDER — SIMVASTATIN 5 MG
5 TABLET ORAL NIGHTLY
COMMUNITY
End: 2021-12-20 | Stop reason: SDUPTHER

## 2021-12-10 RX ORDER — INSULIN GLARGINE 100 [IU]/ML
20 INJECTION, SOLUTION SUBCUTANEOUS 2 TIMES DAILY
COMMUNITY
End: 2021-12-13 | Stop reason: SDUPTHER

## 2021-12-10 SDOH — ECONOMIC STABILITY: FOOD INSECURITY: WITHIN THE PAST 12 MONTHS, THE FOOD YOU BOUGHT JUST DIDN'T LAST AND YOU DIDN'T HAVE MONEY TO GET MORE.: NEVER TRUE

## 2021-12-10 SDOH — ECONOMIC STABILITY: FOOD INSECURITY: WITHIN THE PAST 12 MONTHS, YOU WORRIED THAT YOUR FOOD WOULD RUN OUT BEFORE YOU GOT MONEY TO BUY MORE.: NEVER TRUE

## 2021-12-10 ASSESSMENT — PATIENT HEALTH QUESTIONNAIRE - PHQ9
2. FEELING DOWN, DEPRESSED OR HOPELESS: 1
SUM OF ALL RESPONSES TO PHQ QUESTIONS 1-9: 1
SUM OF ALL RESPONSES TO PHQ QUESTIONS 1-9: 1
SUM OF ALL RESPONSES TO PHQ9 QUESTIONS 1 & 2: 1
1. LITTLE INTEREST OR PLEASURE IN DOING THINGS: 0
SUM OF ALL RESPONSES TO PHQ QUESTIONS 1-9: 1

## 2021-12-10 ASSESSMENT — SOCIAL DETERMINANTS OF HEALTH (SDOH): HOW HARD IS IT FOR YOU TO PAY FOR THE VERY BASICS LIKE FOOD, HOUSING, MEDICAL CARE, AND HEATING?: NOT HARD AT ALL
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
## 2021-12-13 RX ORDER — INSULIN GLARGINE 100 [IU]/ML
20 INJECTION, SOLUTION SUBCUTANEOUS 2 TIMES DAILY
Qty: 36 ML | Refills: 3 | Status: SHIPPED | OUTPATIENT
Start: 2021-12-13 | End: 2021-12-20 | Stop reason: SDUPTHER

## 2021-12-13 NOTE — TELEPHONE ENCOUNTER
I replied to a message earlier that if she cannot make her Free style [facility] work to send a glucometer, test strips and lancets.

## 2021-12-13 NOTE — TELEPHONE ENCOUNTER
Spoke with patient gave her the number to free [facility]  [phone]. They can help the patient better than I could. Also patient would want a refill request for her lantus 100 unit.      Medication:   Requested Prescriptions     Pending Prescriptions Disp Refills    insulin glargine (LANTUS SOLOSTAR) 100 UNIT/ML injection pen 5 pen      Sig: Inject 20 Units into the skin 2 times daily       Last Filled:  10/05/2021    Patient Phone Number: [phone] (home)     Last appt: 12/10/2021   Next appt: 12/27/2021    Last Labs DM:   Lab Results   Component Value Date    LABA1C 7.3 10/28/2020

## 2021-12-16 ENCOUNTER — HOSPITAL ENCOUNTER (OUTPATIENT)
Age: 41
Discharge: HOME | End: 2021-12-16
Payer: MEDICARE

## 2021-12-16 DIAGNOSIS — E11.9 DIABETES MELLITUS, TYPE II, INSULIN DEPENDENT (HCC): ICD-10-CM

## 2021-12-16 DIAGNOSIS — I10 ESSENTIAL HYPERTENSION, BENIGN: ICD-10-CM

## 2021-12-16 DIAGNOSIS — Z79.4 DIABETES MELLITUS, TYPE II, INSULIN DEPENDENT (HCC): ICD-10-CM

## 2021-12-16 LAB
ANION GAP SERPL CALCULATED.3IONS-SCNC: 13 MMOL/L (ref 3–16)
BASOPHILS ABSOLUTE: 0 K/UL (ref 0–0.2)
BASOPHILS RELATIVE PERCENT: 0.1 %
BUN BLDV-MCNC: 11 MG/DL (ref 7–20)
CALCIUM SERPL-MCNC: 10 MG/DL (ref 8.3–10.6)
CHLORIDE BLD-SCNC: 95 MMOL/L (ref 99–110)
CHOLESTEROL, TOTAL: 178 MG/DL (ref 0–199)
CO2: 29 MMOL/L (ref 21–32)
CREAT SERPL-MCNC: 0.8 MG/DL (ref 0.6–1.1)
EOSINOPHILS ABSOLUTE: 0.2 K/UL (ref 0–0.6)
EOSINOPHILS RELATIVE PERCENT: 1.7 %
ESTIMATED AVERAGE GLUCOSE: 203 MG/DL
GFR AFRICAN AMERICAN: >60
GFR NON-AFRICAN AMERICAN: >60
GLUCOSE BLD-MCNC: 207 MG/DL (ref 70–99)
HBA1C MFR BLD: 8.7 %
HCT VFR BLD CALC: 38.3 % (ref 36–48)
HDLC SERPL-MCNC: 33 MG/DL (ref 40–60)
HEMOGLOBIN: 12.5 G/DL (ref 12–16)
LDL CHOLESTEROL CALCULATED: 87 MG/DL
LYMPHOCYTES ABSOLUTE: 2.6 K/UL (ref 1–5.1)
LYMPHOCYTES RELATIVE PERCENT: 29.1 %
MCH RBC QN AUTO: 26.9 PG (ref 26–34)
MCHC RBC AUTO-ENTMCNC: 32.6 G/DL (ref 31–36)
MCV RBC AUTO: 82.6 FL (ref 80–100)
MONOCYTES ABSOLUTE: 0.5 K/UL (ref 0–1.3)
MONOCYTES RELATIVE PERCENT: 6.2 %
NEUTROPHILS ABSOLUTE: 5.6 K/UL (ref 1.7–7.7)
NEUTROPHILS RELATIVE PERCENT: 62.9 %
PDW BLD-RTO: 15.2 % (ref 12.4–15.4)
PLATELET # BLD: 257 K/UL (ref 135–450)
PMV BLD AUTO: 7.7 FL (ref 5–10.5)
POTASSIUM SERPL-SCNC: 4.6 MMOL/L (ref 3.5–5.1)
RBC # BLD: 4.64 M/UL (ref 4–5.2)
SODIUM BLD-SCNC: 137 MMOL/L (ref 136–145)
TRIGL SERPL-MCNC: 290 MG/DL (ref 0–150)
VLDLC SERPL CALC-MCNC: 58 MG/DL
WBC # BLD: 8.9 K/UL (ref 4–11)

## 2021-12-16 PROCEDURE — 36415 COLL VENOUS BLD VENIPUNCTURE: CPT

## 2021-12-16 PROCEDURE — 83036 HEMOGLOBIN GLYCOSYLATED A1C: CPT

## 2021-12-16 PROCEDURE — 80048 BASIC METABOLIC PNL TOTAL CA: CPT

## 2021-12-16 PROCEDURE — 80061 LIPID PANEL: CPT

## 2021-12-16 PROCEDURE — 85025 COMPLETE CBC W/AUTO DIFF WBC: CPT

## 2021-12-17 DIAGNOSIS — Z79.4 DIABETES MELLITUS, TYPE II, INSULIN DEPENDENT (HCC): ICD-10-CM

## 2021-12-17 DIAGNOSIS — E78.2 MIXED HYPERLIPIDEMIA: ICD-10-CM

## 2021-12-17 DIAGNOSIS — I10 ESSENTIAL HYPERTENSION, BENIGN: Primary | ICD-10-CM

## 2021-12-17 DIAGNOSIS — E11.9 DIABETES MELLITUS, TYPE II, INSULIN DEPENDENT (HCC): ICD-10-CM

## 2021-12-17 NOTE — TELEPHONE ENCOUNTER
Rx refill request for  Lisinopril-HCTZ 20/12.5MG  #90  Simvastatin 5MG Tab  #90   Atorvastatin 10MG Tab  #90  Januvia 100MG Tab  #90  Lantus Solostar u-100 (3mL)  90 day supply    Pharmacy: [facility] [place]

## 2021-12-20 RX ORDER — INSULIN GLARGINE 100 [IU]/ML
20 INJECTION, SOLUTION SUBCUTANEOUS 2 TIMES DAILY
Qty: 36 ML | Refills: 3 | Status: ON HOLD | OUTPATIENT
Start: 2021-12-20 | End: 2022-09-05 | Stop reason: SDUPTHER

## 2021-12-20 RX ORDER — SIMVASTATIN 5 MG
5 TABLET ORAL NIGHTLY
Qty: 30 TABLET | Refills: 3 | Status: SHIPPED | OUTPATIENT
Start: 2021-12-20 | End: 2022-02-15 | Stop reason: SDUPTHER

## 2021-12-20 RX ORDER — LISINOPRIL AND HYDROCHLOROTHIAZIDE 20; 12.5 MG/1; MG/1
1 TABLET ORAL DAILY
Qty: 90 TABLET | Refills: 3 | Status: SHIPPED | OUTPATIENT
Start: 2021-12-20 | End: 2022-02-11 | Stop reason: SDUPTHER

## 2021-12-20 NOTE — TELEPHONE ENCOUNTER
Please clarify with the patient which cholesterol medicine she is taking. Patient is requesting simvastatin and atorvastatin refill.  I sent simvastatin since it is what in her list.

## 2021-12-27 ENCOUNTER — OFFICE VISIT (OUTPATIENT)
Dept: PRIMARY CARE CLINIC | Age: 41
End: 2021-12-27
Payer: MEDICARE

## 2021-12-27 VITALS
WEIGHT: 260 LBS | SYSTOLIC BLOOD PRESSURE: 130 MMHG | HEART RATE: 109 BPM | HEIGHT: 61 IN | TEMPERATURE: 97.2 F | BODY MASS INDEX: 49.09 KG/M2 | OXYGEN SATURATION: 98 % | DIASTOLIC BLOOD PRESSURE: 78 MMHG

## 2021-12-27 DIAGNOSIS — J30.1 SEASONAL ALLERGIC RHINITIS DUE TO POLLEN: Primary | ICD-10-CM

## 2021-12-27 DIAGNOSIS — E11.9 DIABETES MELLITUS, TYPE II, INSULIN DEPENDENT (HCC): ICD-10-CM

## 2021-12-27 DIAGNOSIS — Z79.4 DIABETES MELLITUS, TYPE II, INSULIN DEPENDENT (HCC): ICD-10-CM

## 2021-12-27 PROCEDURE — 99213 OFFICE O/P EST LOW 20 MIN: CPT | Performed by: FAMILY MEDICINE

## 2021-12-27 PROCEDURE — 1036F TOBACCO NON-USER: CPT | Performed by: FAMILY MEDICINE

## 2021-12-27 PROCEDURE — 3052F HG A1C>EQUAL 8.0%<EQUAL 9.0%: CPT | Performed by: FAMILY MEDICINE

## 2021-12-27 PROCEDURE — G8482 FLU IMMUNIZE ORDER/ADMIN: HCPCS | Performed by: FAMILY MEDICINE

## 2021-12-27 PROCEDURE — G8417 CALC BMI ABV UP PARAM F/U: HCPCS | Performed by: FAMILY MEDICINE

## 2021-12-27 PROCEDURE — 2022F DILAT RTA XM EVC RTNOPTHY: CPT | Performed by: FAMILY MEDICINE

## 2021-12-27 PROCEDURE — G8427 DOCREV CUR MEDS BY ELIG CLIN: HCPCS | Performed by: FAMILY MEDICINE

## 2021-12-27 RX ORDER — FLUTICASONE PROPIONATE 50 MCG
1 SPRAY, SUSPENSION (ML) NASAL 2 TIMES DAILY
Qty: 1 EACH | Refills: 3 | Status: SHIPPED | OUTPATIENT
Start: 2021-12-27 | End: 2022-01-18

## 2021-12-27 RX ORDER — CETIRIZINE HYDROCHLORIDE 10 MG/1
10 TABLET ORAL DAILY
Qty: 30 TABLET | Refills: 3 | Status: SHIPPED | OUTPATIENT
Start: 2021-12-27 | End: 2022-02-15 | Stop reason: ALTCHOICE

## 2021-12-27 NOTE — PROGRESS NOTES
Chief Complaint   Patient presents with    Diabetes     2 wks follow up           PROGRESS NOTE  Date of Service:  12/27/2021    Chief Complaint   Patient presents with    Diabetes     2 wks follow up       SUBJECTIVE:  Ish Casey is a 39 y.o. female here for 2-week follow-up on her diabetes, unfortunately, just started the insulin adjustment yesterday, Lantus 20 units twice a day, so the sugar was still elevated the past 2 weeks. Went to urgent care 2 days ago due to cough and congestion. No fever. She tested negative for Covid. She was told she will be given cough medicine antibiotic but nothing was sent. No ill contact. No sore throat. No nausea or vomiting. Still not able to make the freestyle zoe work on her phone kristan. Been checking her sugar with a standard glucometer. Patient's medications, allergies, past medical, surgical, social and family histories were reviewed and updated as appropriate. Review of Systems    OBJECTIVE:  /78 (Site: Right Upper Arm, Position: Sitting, Cuff Size: Large Adult)   Pulse 109   Temp 97.2 °F (36.2 °C)   Ht 5' 1.2\" (1.554 m)   Wt 260 lb (117.9 kg)   LMP 05/02/2018   SpO2 98%   BMI 48.81 kg/m²    Physical Exam  Alert oriented x3, not in acute distress  HEENT: Has nasal congestion, throat clear  Chest/lungs: Clear to auscultation, no wheezing  Heart: Regular in rhythm, no murmur appreciated  ASSESSMENT:  1. Seasonal allergic rhinitis due to pollen    2. Diabetes mellitus, type II, insulin dependent ([facility]         PLAN:   1. Seasonal allergic rhinitis due to pollen  More likely allergy related symptoms. We will try Zyrtec 10 mg daily and Flonase 1 spray each nostril twice a day. Call in 3 to 5 days if not better. - cetirizine (ZYRTEC ALLERGY) 10 MG tablet; Take 1 tablet by mouth daily  Dispense: 30 tablet; Refill: 3  - fluticasone (FLONASE) 50 MCG/ACT nasal spray; 1 spray by Each Nostril route 2 times daily  Dispense: 1 each; Refill: 3    2. Diabetes mellitus, type II, insulin dependent ([facility]  No insulin adjustment recommended at this time since she just started the new dose yesterday. Continue to monitor blood sugar. Patient was instructed to send her sugar readings to her my chart and will make insulin adjustment time. But advised to follow-up in 2 weeks if she will be able to download the kristan for her yeimy Mina Ped so we can try again. Otherwise follow-up in 3 months. Return in about 2 weeks (around 1/10/2022) for DM check and in 3 months . Electronically signed by Ese Gonsales MD on 12/27/2021 at 9:50 AM.    This dictation was generated by voice recognition computer software. Although all attempts are made to edit the dictation for accuracy, there may be errors in the transcription that are not intended.

## 2022-01-05 DIAGNOSIS — R05.9 COUGH: Primary | ICD-10-CM

## 2022-01-05 RX ORDER — AZITHROMYCIN 250 MG/1
TABLET, FILM COATED ORAL
Qty: 1 PACKET | Refills: 0 | Status: SHIPPED | OUTPATIENT
Start: 2022-01-05 | End: 2022-02-15 | Stop reason: ALTCHOICE

## 2022-01-12 ENCOUNTER — TELEPHONE (OUTPATIENT)
Dept: PRIMARY CARE CLINIC | Age: 42
End: 2022-01-12

## 2022-01-12 DIAGNOSIS — R05.9 COUGH: ICD-10-CM

## 2022-01-12 DIAGNOSIS — J34.89 NASAL DRAINAGE: Primary | ICD-10-CM

## 2022-01-12 NOTE — TELEPHONE ENCOUNTER
Pt is requesting order for Covid-19 test. States she was exposed on 1/8/22, Sx started 1/9/22: Nasal drainage- clear, productive cough- green phlegm

## 2022-01-27 ENCOUNTER — PATIENT MESSAGE (OUTPATIENT)
Dept: PRIMARY CARE CLINIC | Age: 42
End: 2022-01-27

## 2022-01-27 NOTE — TELEPHONE ENCOUNTER
From: Chip Somers  To: Dr. Mcnally Graft: 1/27/2022 10:01 AM EST  Subject: Non urgert     Can you send a referral over for hand speclistes my right hand falls asleep and i think i have carperal.tunnel need a emg of my right hand

## 2022-01-27 NOTE — TELEPHONE ENCOUNTER
Schedule patient for follow-up, she was supposed to follow-up on January 10 to check her sugar readings and adjust insulin if needed. We will do the evaluation and order test if needed, this could related to neuropathy from diabetes. If EMG showed carpal tunnel then will refer to hand surgeon.

## 2022-02-10 ENCOUNTER — PATIENT MESSAGE (OUTPATIENT)
Dept: PRIMARY CARE CLINIC | Age: 42
End: 2022-02-10

## 2022-02-10 DIAGNOSIS — I10 ESSENTIAL HYPERTENSION, BENIGN: ICD-10-CM

## 2022-02-10 NOTE — TELEPHONE ENCOUNTER
Requested Prescriptions     Pending Prescriptions Disp Refills    lisinopril-hydroCHLOROthiazide (PRINZIDE;ZESTORETIC) 20-12.5 MG per tablet 90 tablet 3     Sig: Take 1 tablet by mouth daily

## 2022-02-10 NOTE — TELEPHONE ENCOUNTER
From: Camron Natarajan  To: Dr. Patton Else: 2/10/2022 8:40 AM EST  Subject: Non urgert     I need a refill on my linsiporal sent to [facility] pharmacy on [facility] in  [facility]

## 2022-02-11 RX ORDER — LISINOPRIL AND HYDROCHLOROTHIAZIDE 20; 12.5 MG/1; MG/1
1 TABLET ORAL DAILY
Qty: 90 TABLET | Refills: 3 | Status: SHIPPED | OUTPATIENT
Start: 2022-02-11 | End: 2022-02-15 | Stop reason: SDUPTHER

## 2022-02-15 ENCOUNTER — OFFICE VISIT (OUTPATIENT)
Dept: PRIMARY CARE CLINIC | Age: 42
End: 2022-02-15
Payer: MEDICARE

## 2022-02-15 VITALS
DIASTOLIC BLOOD PRESSURE: 80 MMHG | TEMPERATURE: 98 F | BODY MASS INDEX: 49.37 KG/M2 | WEIGHT: 263 LBS | OXYGEN SATURATION: 99 % | SYSTOLIC BLOOD PRESSURE: 130 MMHG | HEART RATE: 97 BPM

## 2022-02-15 DIAGNOSIS — J30.1 SEASONAL ALLERGIC RHINITIS DUE TO POLLEN: ICD-10-CM

## 2022-02-15 DIAGNOSIS — E11.9 DIABETES MELLITUS, TYPE II, INSULIN DEPENDENT (HCC): Primary | ICD-10-CM

## 2022-02-15 DIAGNOSIS — B35.3 TINEA PEDIS OF BOTH FEET: ICD-10-CM

## 2022-02-15 DIAGNOSIS — I10 ESSENTIAL HYPERTENSION, BENIGN: ICD-10-CM

## 2022-02-15 DIAGNOSIS — Z79.4 DIABETES MELLITUS, TYPE II, INSULIN DEPENDENT (HCC): Primary | ICD-10-CM

## 2022-02-15 DIAGNOSIS — E78.2 MIXED HYPERLIPIDEMIA: ICD-10-CM

## 2022-02-15 PROBLEM — H92.13 OTORRHEA OF BOTH EARS: Status: RESOLVED | Noted: 2019-09-30 | Resolved: 2022-02-15

## 2022-02-15 PROBLEM — D50.9 IRON DEFICIENCY ANEMIA: Status: RESOLVED | Noted: 2017-11-17 | Resolved: 2022-02-15

## 2022-02-15 PROBLEM — N92.1 MENORRHAGIA WITH IRREGULAR CYCLE: Status: RESOLVED | Noted: 2018-04-30 | Resolved: 2022-02-15

## 2022-02-15 PROBLEM — J45.20 MILD INTERMITTENT ASTHMA WITHOUT COMPLICATION: Status: ACTIVE | Noted: 2019-08-23

## 2022-02-15 PROBLEM — H93.13 SUBJECTIVE TINNITUS OF BOTH EARS: Status: RESOLVED | Noted: 2019-09-30 | Resolved: 2022-02-15

## 2022-02-15 PROBLEM — M17.11 PRIMARY OSTEOARTHRITIS OF RIGHT KNEE: Status: ACTIVE | Noted: 2022-02-02

## 2022-02-15 PROBLEM — Z98.890 S/P ENDOMETRIAL ABLATION: Status: RESOLVED | Noted: 2018-05-16 | Resolved: 2022-02-15

## 2022-02-15 LAB
CREATININE URINE POCT: ABNORMAL
MICROALBUMIN/CREAT 24H UR: ABNORMAL MG/G{CREAT}
MICROALBUMIN/CREAT UR-RTO: ABNORMAL

## 2022-02-15 PROCEDURE — 99213 OFFICE O/P EST LOW 20 MIN: CPT | Performed by: FAMILY MEDICINE

## 2022-02-15 PROCEDURE — 82044 UR ALBUMIN SEMIQUANTITATIVE: CPT | Performed by: FAMILY MEDICINE

## 2022-02-15 RX ORDER — CETIRIZINE HYDROCHLORIDE 10 MG/1
10 TABLET ORAL DAILY
Qty: 30 TABLET | Refills: 5 | Status: SHIPPED | OUTPATIENT
Start: 2022-02-15 | End: 2022-08-26

## 2022-02-15 RX ORDER — DULAGLUTIDE 0.75 MG/.5ML
INJECTION, SOLUTION SUBCUTANEOUS
COMMUNITY
Start: 2022-01-17 | End: 2022-02-15 | Stop reason: SINTOL

## 2022-02-15 RX ORDER — SIMVASTATIN 5 MG
5 TABLET ORAL NIGHTLY
Qty: 90 TABLET | Refills: 3 | Status: SHIPPED | OUTPATIENT
Start: 2022-02-15 | End: 2023-02-15

## 2022-02-15 RX ORDER — ATORVASTATIN CALCIUM 10 MG/1
TABLET, FILM COATED ORAL
COMMUNITY
Start: 2022-01-27 | End: 2022-02-15 | Stop reason: ALTCHOICE

## 2022-02-15 RX ORDER — KETOCONAZOLE 20 MG/G
CREAM TOPICAL
Qty: 60 G | Refills: 1 | Status: SHIPPED | OUTPATIENT
Start: 2022-02-15 | End: 2022-05-20

## 2022-02-15 RX ORDER — LISINOPRIL AND HYDROCHLOROTHIAZIDE 20; 12.5 MG/1; MG/1
1 TABLET ORAL DAILY
Qty: 90 TABLET | Refills: 3 | Status: SHIPPED | OUTPATIENT
Start: 2022-02-15 | End: 2023-02-15

## 2022-02-15 RX ORDER — MELOXICAM 15 MG/1
TABLET ORAL
COMMUNITY
Start: 2022-02-02 | End: 2022-08-26

## 2022-02-15 RX ORDER — CLOTRIMAZOLE AND BETAMETHASONE DIPROPIONATE 10; .5 MG/ML; MG/ML
LOTION TOPICAL
COMMUNITY
Start: 2022-02-14 | End: 2022-02-15 | Stop reason: ALTCHOICE
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
## 2022-02-25 ENCOUNTER — TELEPHONE (OUTPATIENT)
Dept: PRIMARY CARE CLINIC | Age: 42
End: 2022-02-25

## 2022-02-25 NOTE — TELEPHONE ENCOUNTER
If this visit is to to get a power wheelchair, inform patient that I do not recommend or prescribe it.   She needs to see orthopedic surgeon to get the prescription or order

## 2022-03-04 ENCOUNTER — PATIENT MESSAGE (OUTPATIENT)
Dept: PRIMARY CARE CLINIC | Age: 42
End: 2022-03-04

## 2022-03-04 NOTE — TELEPHONE ENCOUNTER
Increase lisinopril HCT 20/12.5 daily to twice a day. Continue to monitor blood pressure. Recommend to go to the hospital if not better.

## 2022-03-04 NOTE — TELEPHONE ENCOUNTER
From: Chip Somers  To: Dr. Mcnally Graft: 3/4/2022 10:26 AM EST  Subject: Non urgert     I have a migraine and been feeling dizzy and short of breath and I my blood pressure been running high

## 2022-03-15 ENCOUNTER — PATIENT MESSAGE (OUTPATIENT)
Dept: PRIMARY CARE CLINIC | Age: 42
End: 2022-03-15

## 2022-03-15 DIAGNOSIS — E11.65 POORLY CONTROLLED DIABETES MELLITUS (HCC): Primary | ICD-10-CM

## 2022-03-15 NOTE — TELEPHONE ENCOUNTER
Msg given to the pt, she was provided with the endocrinologist  phone number and address, pt expressed undrestanding.

## 2022-03-15 NOTE — TELEPHONE ENCOUNTER
Advise patient to increase her Lantus from 20 units to 40 units twice a day and we will process a referral to endocrinologist for better management of her diabetes. We will send her to Dr. Genevieve Diaz and give her the phone number so she can make appointment.

## 2022-03-15 NOTE — TELEPHONE ENCOUNTER
From: Mitul Espinosa  To: Dr. Faith Hunger: 3/15/2022 11:07 AM EDT  Subject: Non urgent     I don't feel good headache going pee more and sugar lowestthis morning 337 highest 519 shortness of breath

## 2022-03-17 ENCOUNTER — TELEPHONE (OUTPATIENT)
Dept: PRIMARY CARE CLINIC | Age: 42
End: 2022-03-17

## 2022-03-17 DIAGNOSIS — E11.65 POORLY CONTROLLED DIABETES MELLITUS (HCC): Primary | ICD-10-CM

## 2022-03-17 NOTE — TELEPHONE ENCOUNTER
This 2 places accepting new patients:     The [facility]  Address: [address] [facility], [place], [address]  Phone: [phone]     Health/Endocrinologist  Dr. Patsy Maldonado MD  Address: [address] [facility]  Phone: [phone]  Fax: [phone]

## 2022-03-18 ENCOUNTER — TELEPHONE (OUTPATIENT)
Dept: PRIMARY CARE CLINIC | Age: 42
End: 2022-03-18

## 2022-04-11 ENCOUNTER — TELEPHONE (OUTPATIENT)
Dept: PRIMARY CARE CLINIC | Age: 42
End: 2022-04-11

## 2022-04-11 NOTE — TELEPHONE ENCOUNTER
Pt is calling to see if she can get an ED follow up sooner then what is avaible please advise if pt can be schedule sooner pt states she also has a cough but has been tested at hospital and was negative for covid soonest avaible is April 27th

## 2022-05-19 ASSESSMENT — ENCOUNTER SYMPTOMS
NAUSEA: 0
COUGH: 0
CHEST TIGHTNESS: 0
CONSTIPATION: 0
WHEEZING: 0
SHORTNESS OF BREATH: 0

## 2022-05-20 ENCOUNTER — OFFICE VISIT (OUTPATIENT)
Dept: FAMILY MEDICINE CLINIC | Age: 42
End: 2022-05-20
Payer: MEDICARE

## 2022-05-20 VITALS
HEART RATE: 87 BPM | SYSTOLIC BLOOD PRESSURE: 122 MMHG | DIASTOLIC BLOOD PRESSURE: 82 MMHG | WEIGHT: 260 LBS | OXYGEN SATURATION: 97 % | BODY MASS INDEX: 48.81 KG/M2 | TEMPERATURE: 97.2 F

## 2022-05-20 DIAGNOSIS — E66.01 CLASS 3 SEVERE OBESITY DUE TO EXCESS CALORIES WITHOUT SERIOUS COMORBIDITY WITH BODY MASS INDEX (BMI) OF 45.0 TO 49.9 IN ADULT (HCC): ICD-10-CM

## 2022-05-20 DIAGNOSIS — F70 MILD INTELLECTUAL DISABILITY: ICD-10-CM

## 2022-05-20 DIAGNOSIS — F31.76 BIPOLAR DISORDER, IN FULL REMISSION, MOST RECENT EPISODE DEPRESSED (HCC): ICD-10-CM

## 2022-05-20 DIAGNOSIS — E11.65 TYPE 2 DIABETES MELLITUS WITH HYPERGLYCEMIA, WITH LONG-TERM CURRENT USE OF INSULIN (HCC): ICD-10-CM

## 2022-05-20 DIAGNOSIS — I10 ESSENTIAL HYPERTENSION: ICD-10-CM

## 2022-05-20 DIAGNOSIS — R11.0 NAUSEA: ICD-10-CM

## 2022-05-20 DIAGNOSIS — K21.9 GASTROESOPHAGEAL REFLUX DISEASE, UNSPECIFIED WHETHER ESOPHAGITIS PRESENT: ICD-10-CM

## 2022-05-20 DIAGNOSIS — G47.33 OSA (OBSTRUCTIVE SLEEP APNEA): ICD-10-CM

## 2022-05-20 DIAGNOSIS — F33.1 MODERATE EPISODE OF RECURRENT MAJOR DEPRESSIVE DISORDER (HCC): ICD-10-CM

## 2022-05-20 DIAGNOSIS — Z79.4 TYPE 2 DIABETES MELLITUS WITH HYPERGLYCEMIA, WITH LONG-TERM CURRENT USE OF INSULIN (HCC): ICD-10-CM

## 2022-05-20 DIAGNOSIS — R07.81 RIB PAIN ON RIGHT SIDE: Primary | ICD-10-CM

## 2022-05-20 DIAGNOSIS — F41.9 ANXIETY: ICD-10-CM

## 2022-05-20 LAB
BILIRUBIN, POC: NORMAL
BLOOD URINE, POC: NORMAL
CHP ED QC CHECK: NORMAL
CLARITY, POC: NORMAL
COLOR, POC: YELLOW
GLUCOSE BLD-MCNC: 248 MG/DL
GLUCOSE URINE, POC: >1000
KETONES, POC: NORMAL
LEUKOCYTE EST, POC: NORMAL
NITRITE, POC: NORMAL
PH, POC: 5
PROTEIN, POC: NORMAL
SPECIFIC GRAVITY, POC: 1.02
UROBILINOGEN, POC: 0.2

## 2022-05-20 PROCEDURE — 3046F HEMOGLOBIN A1C LEVEL >9.0%: CPT | Performed by: CLINICAL NURSE SPECIALIST

## 2022-05-20 PROCEDURE — 1036F TOBACCO NON-USER: CPT | Performed by: CLINICAL NURSE SPECIALIST

## 2022-05-20 PROCEDURE — G8427 DOCREV CUR MEDS BY ELIG CLIN: HCPCS | Performed by: CLINICAL NURSE SPECIALIST

## 2022-05-20 PROCEDURE — 99214 OFFICE O/P EST MOD 30 MIN: CPT | Performed by: CLINICAL NURSE SPECIALIST

## 2022-05-20 PROCEDURE — G8417 CALC BMI ABV UP PARAM F/U: HCPCS | Performed by: CLINICAL NURSE SPECIALIST

## 2022-05-20 PROCEDURE — 81002 URINALYSIS NONAUTO W/O SCOPE: CPT | Performed by: CLINICAL NURSE SPECIALIST

## 2022-05-20 PROCEDURE — 82962 GLUCOSE BLOOD TEST: CPT | Performed by: CLINICAL NURSE SPECIALIST

## 2022-05-20 PROCEDURE — 2022F DILAT RTA XM EVC RTNOPTHY: CPT | Performed by: CLINICAL NURSE SPECIALIST

## 2022-05-20 RX ORDER — CARIPRAZINE 3 MG/1
CAPSULE, GELATIN COATED ORAL
COMMUNITY
Start: 2022-05-11 | End: 2022-08-26

## 2022-05-20 RX ORDER — IBUPROFEN 600 MG/1
600 TABLET ORAL EVERY 8 HOURS PRN
Qty: 90 TABLET | Refills: 0 | Status: ON HOLD | OUTPATIENT
Start: 2022-05-20 | End: 2022-09-03

## 2022-05-20 RX ORDER — FAMOTIDINE 20 MG/1
20 TABLET, FILM COATED ORAL 2 TIMES DAILY PRN
Qty: 60 TABLET | Refills: 0 | Status: SHIPPED | OUTPATIENT
Start: 2022-05-20 | End: 2022-08-26

## 2022-05-20 RX ORDER — ONDANSETRON 4 MG/1
4 TABLET, ORALLY DISINTEGRATING ORAL EVERY 8 HOURS PRN
Qty: 20 TABLET | Refills: 0 | Status: SHIPPED | OUTPATIENT
Start: 2022-05-20

## 2022-05-20 ASSESSMENT — PATIENT HEALTH QUESTIONNAIRE - PHQ9
SUM OF ALL RESPONSES TO PHQ QUESTIONS 1-9: 4
1. LITTLE INTEREST OR PLEASURE IN DOING THINGS: 2
SUM OF ALL RESPONSES TO PHQ QUESTIONS 1-9: 4
2. FEELING DOWN, DEPRESSED OR HOPELESS: 2
SUM OF ALL RESPONSES TO PHQ9 QUESTIONS 1 & 2: 4

## 2022-05-20 ASSESSMENT — ENCOUNTER SYMPTOMS
ABDOMINAL PAIN: 1
VOMITING: 1
DIARRHEA: 1

## 2022-05-20 NOTE — PATIENT INSTRUCTIONS
Trial of ibuprofen 600 mg 2-3 times a day as needed for right side pain    Trail of famotidine 20 mg twice a day as needed for indigestion (before breakfast and dinner)    Discussed GERD precautions, information given    Small frequent sips and snacks/meals    Keep diet bland avoid spicy and greasy foods    BRAT diet (bananas, rice, applesauce and toast)    Advance as tolerated (scrambled eggs, baked chicken)    To the ER for increased pain, fever, inability to hold down food and fluids    Restart Sitagliptin 100 mg once daily    Discussed diabetic diet information given    Follow up in 1 month, sooner if symptoms worsen or persist   Patient Education        Learning About Meal Planning for Diabetes  Why plan your meals? Meal planning can be a key part of managing diabetes. Planning meals and snacks with the right balance of carbohydrate, protein, and fat can help you keep yourblood sugar at the target level you set with your doctor. You don't have to eat special foods. You can eat what your family eats, including sweets once in a while. But you do have to pay attention to how oftenyou eat and how much you eat of certain foods. You may want to work with a dietitian or a diabetes educator. They can give you tips and meal ideas and can answer your questions about meal planning. This health professional can also help you reach a healthy weight if that is one ofyour goals. What plan is right for you? Your dietitian or diabetes educator may suggest that you start with the plateformat or carbohydrate counting. The plate format  The plate format is a simple way to help you manage how you eat. You plan meals by learning how much space each food should take on a plate. Using the plate format helps you manage the amount of carbohydrate you eat. It can make it easier to keep your blood sugar level within your target range. It also helpsyou see if you're eating healthy portion sizes.   To use the plate format, you put non-starchy vegetables on half your plate. Add lean protein foods, such as fish, lean meats and poultry, or soy products, on one-quarter of the plate. Put a grain or starchy vegetable (such as brown rice or a potato) on the final quarter of the plate. You can add a small piece of fruit and some low-fat or fat-free milk or yogurt, depending on yourcarbohydrate goal for each meal.  Here are some tips for using the plate format:   Make sure that you are not using an oversized plate. A 9-inch plate is best. Many restaurants use larger plates.  Get used to using the plate format at home. Then you can use it when you eat out.  Write down your questions about using the plate format. Talk to your doctor, a dietitian, or a diabetes educator about your concerns. Carbohydrate counting  With carbohydrate counting, you plan meals based on the amount of carbohydrate in each food. Carbohydrate raises blood sugar higher and more quickly than any other nutrient. It is found in desserts, breads and cereals, and fruit. It's also found in starchy vegetables such as potatoes and corn, grains such as rice and pasta, and milk and yogurt. You can help keep your blood sugar levels within your target range by planning how much carbohydrate to have at meals andsnacks. The amount you need depends on several things. These include your weight, how active you are, which diabetes medicines you take, and what your goals are for your blood sugar levels. A registered dietitian or diabetes educator can helpyou plan how much carbohydrate to include in each meal and snack. An example of a carbohydrate counting plan is:   45 to 60 grams at each meal. That's about the same as 3 to 4 carbohydrate servings.  15 to 20 grams at each snack. That's about the same as 1 carbohydrate serving. The Nutrition Facts label on packaged foods tells you how much carbohydrate is in a serving of the food. First, look at the serving size on the food label.  Is that the amount you eat in a serving? All of the nutrition information on a food label is based on that serving size. So if you eat more or less than that, you'll need to adjust the other numbers. Total carbohydrate is the next thing you need to look for on the label. If you count carbohydrate servings, oneserving of carbohydrate is 15 grams. For foods that don't come with labels, such as fresh fruits and vegetables, you'll need a guide that lists carbohydrate in these foods. Ask your doctor, dietitian, or diabetes educator about books or other nutrition guides you canuse. If you take insulin, you need to know how many grams of carbohydrate are in a meal. This lets you know how much rapid-acting insulin to take before you eat. If you use an insulin pump, you get a constant rate of insulin during the day. So the pump must be programmed at meals to give you extra insulin to cover therise in blood sugar after meals. When you know how much carbohydrate you will eat, you can take the right amount of insulin. Or, if you always use the same amount of insulin, you need to [facility] that you eat the same amount of carbohydrate at meals. If you need more help to understand carbohydrate counting and food labels, askyour doctor, dietitian, or diabetes educator. How can you plan healthy meals? Here are some tips to get started:  [facility] your meals a week at a time. Don't forget to include snacks too.  Use cookbooks or online recipes to plan several main meals. Plan some quick meals for busy nights. You also can double some recipes that freeze well. Then you can save half for other busy nights when you don't have time to cook.  Make sure you have the ingredients you need for your recipes. If you're running low on basic items, put these items on your shopping list too.  List foods that you use to make breakfasts, lunches, and snacks. List plenty of fruits and vegetables.  Post this list on the refrigerator.  Add to it as you think of more things you need.  Take the list to the store to do your weekly shopping. Follow-up care is a key part of your treatment and safety. Be sure to make and go to all appointments, and call your doctor if you are having problems. It's also a good idea to know your test results and keep alist of the medicines you take. Where can you learn more? Go to https://Traianapepiceweb.ThousandEyes. org and sign in to your [employer] account. Enter L447 in the [employer] box to learn more about \"Learning About Meal Planning for Diabetes. \"     If you do not have an account, please click on the \"Sign Up Now\" link. Current as of: September 8, 2021               Content Version: 13.2  © 2006-2022 Healthwise, [employer]. Care instructions adapted under license by [facility] ([facility]). If you have questions about a medical condition or this instruction, always ask your healthcare professional. [place] [address] any warranty or liability for your use of this information. Patient Education        Learning About Carbohydrate (Carb) Counting and Eating Out When You Have Diabetes  Why plan your meals? Meal planning can be a key part of managing diabetes. Planning meals and snacks with the right balance of carbohydrate, protein, and fat can help you keep yourblood sugar at the target level you set with your doctor. You don't have to eat special foods. You can eat what your family eats, including sweets once in a while. But you do have to pay attention to how oftenyou eat and how much you eat of certain foods. You may want to work with a dietitian or a diabetes educator. They can give you tips and meal ideas and can answer your questions about meal planning. This health professional can also help you reach a healthy weight if that is one ofyour goals. What should you know about eating carbs?   Managing the amount of carbohydrate (carbs) you eat is an important part ofhealthy meals cottage cheese, and peanut butter. How can you eat out and still eat healthy?  Learn to estimate the serving sizes of foods that have carbohydrate. If you measure food at home, it will be easier to estimate the amount in a serving of restaurant food.  If the meal you order has too much carbohydrate (such as potatoes, corn, or baked beans), ask to have a low-carbohydrate food instead. Ask for a salad or non-starchy vegetables like broccoli, cauliflower, green beans, or peppers.  If you eat more carbohydrate at a meal than you had planned, take a walk or do other exercise. This will help lower your blood sugar. What are some tips for eating healthy?  Limit saturated fat, such as the fat from meat and dairy products. This is a healthy choice because people who have diabetes are at higher risk of heart disease. So choose lean cuts of meat and nonfat or low-fat dairy products. Use olive or canola oil instead of butter or shortening when cooking.  Don't skip meals. Your blood sugar may drop too low if you skip meals and take insulin or certain medicines for diabetes.  Check with your doctor before you drink alcohol. Alcohol can cause your blood sugar to drop too low. Alcohol can also cause a bad reaction if you take certain diabetes medicines. Follow-up care is a key part of your treatment and safety. Be sure to make and go to all appointments, and call your doctor if you are having problems. It's also a good idea to know your test results and keep alist of the medicines you take. Where can you learn more? Go to https://VGo Communicationschaparrita.Thinktwice. org and sign in to your "[employer] account. Enter Z566 in the [facility] box to learn more about \"Learning About Carbohydrate (Carb) Counting and Eating Out When You Have Diabetes. \"     If you do not have an account, please click on the \"Sign Up Now\" link.   Current as of: September 8, 2021               Content Version: 13.2  © 5229-1107 Healthwise, Incorporated. Care instructions adapted under license by [facility] ([facility]). If you have questions about a medical condition or this instruction, always ask your healthcare professional. [address] any warranty or liability for your use of this information. Patient Education        Counting Carbohydrates for Diabetes: Care Instructions  Overview     Managing the amount of carbohydrate (carbs) you eat is an important part of planning healthy meals when you have diabetes. Carbs raise blood sugar more than any other nutrient. Carbs are found in grains, starchy vegetables, fruits,and milk and yogurt. Carbs are also found in sugar-sweetened foods and drinks. The more carbs you eat at one time, the higher your blood sugar will rise. Counting carbs can help you keep your blood sugar within your target range. If you use insulin, counting carbs helps you match the right amount of insulinto the number of grams of carbs in a meal.  A registered dietitian or diabetes educator can help you plan meals and snacks. Follow-up care is a key part of your treatment and safety. Be sure to make and go to all appointments, and call your doctor if you are having problems. It's also a good idea to know your test results and keep alist of the medicines you take. How can you care for yourself at home? Know your daily amount of carbohydrates  Your daily amount depends on several things, such as your weight, how active you are, which diabetes medicines you take, and what your goals are for your blood sugar levels. A registered dietitian or diabetes educator can help youplan how many carbs to include in each meal and snack. For most adults, a guideline for the daily amount of carbs is:   45 to 60 grams at each meal. That's about the same as 3 to 4 carbohydrate servings.  15 to 20 grams at each snack. That's about the same as 1 carbohydrate serving.   Count carbs  Counting carbs lets you know how much license by [facility] ([facility]). If you have questions about a medical condition or this instruction, always ask your healthcare professional. [place] [address] any warranty or liability for your use of this information.
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
## 2022-06-23 DIAGNOSIS — Z79.4 DIABETES MELLITUS, TYPE II, INSULIN DEPENDENT (HCC): ICD-10-CM

## 2022-06-23 DIAGNOSIS — E11.9 DIABETES MELLITUS, TYPE II, INSULIN DEPENDENT (HCC): ICD-10-CM

## 2022-06-23 DIAGNOSIS — K21.9 GASTROESOPHAGEAL REFLUX DISEASE, UNSPECIFIED WHETHER ESOPHAGITIS PRESENT: ICD-10-CM

## 2022-06-23 RX ORDER — FAMOTIDINE 20 MG/1
20 TABLET, FILM COATED ORAL 2 TIMES DAILY PRN
Qty: 60 TABLET | Refills: 0 | OUTPATIENT
Start: 2022-06-23

## 2022-06-23 NOTE — TELEPHONE ENCOUNTER
Pt called requesting refills of insulin pen needle 31g and lisinopril-HCTZ 20-12.5mg. [facility] [place] [facility] [address]. Pt also stated she has a meter but she is out of strips and needles.

## 2022-06-28 DIAGNOSIS — R07.81 RIB PAIN ON RIGHT SIDE: ICD-10-CM

## 2022-06-28 RX ORDER — IBUPROFEN 600 MG/1
600 TABLET ORAL EVERY 8 HOURS PRN
Qty: 90 TABLET | Refills: 0 | OUTPATIENT
Start: 2022-06-28

## 2022-07-06 RX ORDER — LANCETS
1 EACH MISCELLANEOUS 3 TIMES DAILY
Qty: 200 EACH | Refills: 2 | Status: SHIPPED | OUTPATIENT
Start: 2022-07-06 | End: 2022-08-26

## 2022-07-06 RX ORDER — GLUCOSAMINE HCL/CHONDROITIN SU 500-400 MG
CAPSULE ORAL
Qty: 200 STRIP | Refills: 2 | Status: ON HOLD | OUTPATIENT
Start: 2022-07-06 | End: 2022-09-05 | Stop reason: HOSPADM

## 2022-08-26 ENCOUNTER — OFFICE VISIT (OUTPATIENT)
Dept: INTERNAL MEDICINE CLINIC | Age: 42
End: 2022-08-26
Payer: MEDICARE

## 2022-08-26 ENCOUNTER — PATIENT MESSAGE (OUTPATIENT)
Dept: INTERNAL MEDICINE CLINIC | Age: 42
End: 2022-08-26

## 2022-08-26 VITALS
WEIGHT: 253 LBS | HEART RATE: 110 BPM | OXYGEN SATURATION: 98 % | BODY MASS INDEX: 47.77 KG/M2 | SYSTOLIC BLOOD PRESSURE: 124 MMHG | HEIGHT: 61 IN | DIASTOLIC BLOOD PRESSURE: 88 MMHG

## 2022-08-26 DIAGNOSIS — Z12.31 BREAST CANCER SCREENING BY MAMMOGRAM: ICD-10-CM

## 2022-08-26 DIAGNOSIS — I10 ESSENTIAL HYPERTENSION: ICD-10-CM

## 2022-08-26 DIAGNOSIS — E66.01 MORBID OBESITY WITH BMI OF 45.0-49.9, ADULT (HCC): ICD-10-CM

## 2022-08-26 DIAGNOSIS — G47.33 OBSTRUCTIVE SLEEP APNEA: ICD-10-CM

## 2022-08-26 DIAGNOSIS — G43.009 MIGRAINE WITHOUT AURA AND WITHOUT STATUS MIGRAINOSUS, NOT INTRACTABLE: ICD-10-CM

## 2022-08-26 DIAGNOSIS — Z23 NEED FOR 23-POLYVALENT PNEUMOCOCCAL POLYSACCHARIDE VACCINE: ICD-10-CM

## 2022-08-26 DIAGNOSIS — J45.20 MILD INTERMITTENT ASTHMA WITHOUT COMPLICATION: ICD-10-CM

## 2022-08-26 DIAGNOSIS — E11.9 TYPE 2 DIABETES MELLITUS WITHOUT COMPLICATION, WITH LONG-TERM CURRENT USE OF INSULIN (HCC): Primary | ICD-10-CM

## 2022-08-26 DIAGNOSIS — Z00.00 PREVENTATIVE HEALTH CARE: ICD-10-CM

## 2022-08-26 DIAGNOSIS — Z79.4 TYPE 2 DIABETES MELLITUS WITHOUT COMPLICATION, WITH LONG-TERM CURRENT USE OF INSULIN (HCC): ICD-10-CM

## 2022-08-26 DIAGNOSIS — K21.9 CHRONIC GERD: ICD-10-CM

## 2022-08-26 DIAGNOSIS — E11.9 TYPE 2 DIABETES MELLITUS WITHOUT COMPLICATION, WITH LONG-TERM CURRENT USE OF INSULIN (HCC): ICD-10-CM

## 2022-08-26 DIAGNOSIS — E78.2 MIXED HYPERLIPIDEMIA: ICD-10-CM

## 2022-08-26 DIAGNOSIS — B37.31 VAGINAL CANDIDIASIS: ICD-10-CM

## 2022-08-26 DIAGNOSIS — F31.30 BIPOLAR AFFECTIVE DISORDER, CURRENT EPISODE DEPRESSED, CURRENT EPISODE SEVERITY UNSPECIFIED (HCC): Chronic | ICD-10-CM

## 2022-08-26 DIAGNOSIS — Z79.4 TYPE 2 DIABETES MELLITUS WITHOUT COMPLICATION, WITH LONG-TERM CURRENT USE OF INSULIN (HCC): Primary | ICD-10-CM

## 2022-08-26 PROBLEM — M77.30 CALCANEAL SPUR: Status: RESOLVED | Noted: 2019-08-23 | Resolved: 2022-08-26

## 2022-08-26 PROBLEM — M72.2 PLANTAR FASCIITIS: Status: RESOLVED | Noted: 2019-08-23 | Resolved: 2022-08-26

## 2022-08-26 PROBLEM — E66.9 DIABETES MELLITUS TYPE 2 IN OBESE (HCC): Status: RESOLVED | Noted: 2021-03-09 | Resolved: 2022-08-26

## 2022-08-26 PROBLEM — B35.1 ONYCHOMYCOSIS: Status: RESOLVED | Noted: 2018-04-02 | Resolved: 2022-08-26

## 2022-08-26 PROBLEM — E11.69 DIABETES MELLITUS TYPE 2 IN OBESE (HCC): Status: RESOLVED | Noted: 2021-03-09 | Resolved: 2022-08-26

## 2022-08-26 PROBLEM — D50.9 IRON DEFICIENCY ANEMIA: Status: RESOLVED | Noted: 2017-11-17 | Resolved: 2022-08-26

## 2022-08-26 PROBLEM — F33.1 MODERATE EPISODE OF RECURRENT MAJOR DEPRESSIVE DISORDER (HCC): Status: RESOLVED | Noted: 2019-08-23 | Resolved: 2022-08-26

## 2022-08-26 PROBLEM — D50.9 IRON DEFICIENCY ANEMIA: Status: ACTIVE | Noted: 2017-11-17

## 2022-08-26 LAB
A/G RATIO: 1.3 (ref 1.1–2.2)
ALBUMIN SERPL-MCNC: 4.5 G/DL (ref 3.4–5)
ALP BLD-CCNC: 150 U/L (ref 40–129)
ALT SERPL-CCNC: 35 U/L (ref 10–40)
ANION GAP SERPL CALCULATED.3IONS-SCNC: 16 MMOL/L (ref 3–16)
AST SERPL-CCNC: 46 U/L (ref 15–37)
BILIRUB SERPL-MCNC: 0.3 MG/DL (ref 0–1)
BUN BLDV-MCNC: 12 MG/DL (ref 7–20)
CALCIUM SERPL-MCNC: 9.6 MG/DL (ref 8.3–10.6)
CHLORIDE BLD-SCNC: 91 MMOL/L (ref 99–110)
CHOLESTEROL, TOTAL: 220 MG/DL (ref 0–199)
CO2: 28 MMOL/L (ref 21–32)
CREAT SERPL-MCNC: 0.9 MG/DL (ref 0.6–1.1)
CREATININE URINE: 43.1 MG/DL (ref 28–259)
GFR AFRICAN AMERICAN: >60
GFR NON-AFRICAN AMERICAN: >60
GLUCOSE BLD-MCNC: 309 MG/DL (ref 70–99)
HCT VFR BLD CALC: 39.7 % (ref 36–48)
HDLC SERPL-MCNC: 34 MG/DL (ref 40–60)
HEMOGLOBIN: 13.2 G/DL (ref 12–16)
HEPATITIS C ANTIBODY INTERPRETATION: NORMAL
LDL CHOLESTEROL CALCULATED: ABNORMAL MG/DL
LDL CHOLESTEROL DIRECT: 105 MG/DL
MCH RBC QN AUTO: 28.1 PG (ref 26–34)
MCHC RBC AUTO-ENTMCNC: 33.3 G/DL (ref 31–36)
MCV RBC AUTO: 84.4 FL (ref 80–100)
MICROALBUMIN UR-MCNC: <1.2 MG/DL
MICROALBUMIN/CREAT UR-RTO: NORMAL MG/G (ref 0–30)
PDW BLD-RTO: 15.7 % (ref 12.4–15.4)
PLATELET # BLD: 307 K/UL (ref 135–450)
PMV BLD AUTO: 7 FL (ref 5–10.5)
POTASSIUM SERPL-SCNC: 4.3 MMOL/L (ref 3.5–5.1)
RBC # BLD: 4.71 M/UL (ref 4–5.2)
SODIUM BLD-SCNC: 135 MMOL/L (ref 136–145)
TOTAL PROTEIN: 8 G/DL (ref 6.4–8.2)
TRIGL SERPL-MCNC: 456 MG/DL (ref 0–150)
TSH REFLEX FT4: 1.36 UIU/ML (ref 0.27–4.2)
VLDLC SERPL CALC-MCNC: ABNORMAL MG/DL
WBC # BLD: 12.2 K/UL (ref 4–11)

## 2022-08-26 PROCEDURE — 99214 OFFICE O/P EST MOD 30 MIN: CPT | Performed by: INTERNAL MEDICINE

## 2022-08-26 PROCEDURE — 2022F DILAT RTA XM EVC RTNOPTHY: CPT | Performed by: INTERNAL MEDICINE

## 2022-08-26 PROCEDURE — G8427 DOCREV CUR MEDS BY ELIG CLIN: HCPCS | Performed by: INTERNAL MEDICINE

## 2022-08-26 PROCEDURE — 99396 PREV VISIT EST AGE 40-64: CPT | Performed by: INTERNAL MEDICINE

## 2022-08-26 PROCEDURE — 1036F TOBACCO NON-USER: CPT | Performed by: INTERNAL MEDICINE

## 2022-08-26 PROCEDURE — G8417 CALC BMI ABV UP PARAM F/U: HCPCS | Performed by: INTERNAL MEDICINE

## 2022-08-26 PROCEDURE — 3046F HEMOGLOBIN A1C LEVEL >9.0%: CPT | Performed by: INTERNAL MEDICINE

## 2022-08-26 RX ORDER — GABAPENTIN 300 MG/1
CAPSULE ORAL
COMMUNITY
Start: 2022-08-04

## 2022-08-26 RX ORDER — FLUCONAZOLE 150 MG/1
150 TABLET ORAL ONCE
Qty: 1 TABLET | Refills: 0 | Status: SHIPPED | OUTPATIENT
Start: 2022-08-26 | End: 2022-08-26

## 2022-08-26 RX ORDER — BLOOD-GLUCOSE METER
1 KIT MISCELLANEOUS DAILY
Qty: 1 KIT | Refills: 0 | Status: SHIPPED | OUTPATIENT
Start: 2022-08-26

## 2022-08-26 RX ORDER — GLUCOSAMINE HCL/CHONDROITIN SU 500-400 MG
CAPSULE ORAL
Qty: 100 STRIP | Refills: 3 | Status: SHIPPED | OUTPATIENT
Start: 2022-08-26

## 2022-08-26 RX ORDER — LURASIDONE HYDROCHLORIDE 40 MG/1
TABLET, FILM COATED ORAL
Status: ON HOLD | COMMUNITY
Start: 2022-07-28 | End: 2022-09-05 | Stop reason: HOSPADM

## 2022-08-26 RX ORDER — HYDROXYZINE HYDROCHLORIDE 25 MG/1
25 TABLET, FILM COATED ORAL 3 TIMES DAILY PRN
Status: ON HOLD | COMMUNITY
End: 2022-09-03

## 2022-08-26 RX ORDER — LANCETS 30 GAUGE
1 EACH MISCELLANEOUS DAILY
Qty: 100 EACH | Refills: 5 | Status: SHIPPED | OUTPATIENT
Start: 2022-08-26

## 2022-08-26 RX ORDER — ALBUTEROL SULFATE 90 UG/1
2 AEROSOL, METERED RESPIRATORY (INHALATION) EVERY 6 HOURS PRN
Qty: 18 G | Refills: 3 | Status: SHIPPED | OUTPATIENT
Start: 2022-08-26

## 2022-08-26 ASSESSMENT — ENCOUNTER SYMPTOMS
CONSTIPATION: 0
WHEEZING: 0
COUGH: 0
VOMITING: 0
CHEST TIGHTNESS: 0
SHORTNESS OF BREATH: 0
NAUSEA: 0
SORE THROAT: 0
COLOR CHANGE: 0
ABDOMINAL PAIN: 0
BACK PAIN: 0

## 2022-08-26 NOTE — ASSESSMENT & PLAN NOTE
Continue pantoprazole, reinforced recommendations for antireflux diet, encouraged attempts for weight loss

## 2022-08-26 NOTE — PROGRESS NOTES
ASSESSMENT/PLAN:  1. Type 2 diabetes mellitus without complication, with long-term current use of insulin ([facility])  Assessment & Plan:   Uncontrolled secondary to noncompliance with treatment plan and diet, I had prolonged discussion with patient regarding serious side effects of uncontrolled diabetes, advised to restart Lantus insulin at 40 units, ordered glucometer and supplies, continue Januvia and add Jardiance even though patient currently having a yeast infection advised this is most likely secondary to uncontrolled diabetes however if urogenital infections become more recurrent after starting Jardiance then will discontinue  We will check labs today and reevaluate in 3 months, if still no significant improvement will probably add semaglutide. Need for updated eye exam explained to patient  Orders:  -     Insulin Pen Needle 31G X 5 MM MISC; DAILY Starting Fri 8/26/2022, Disp-100 each, R-3, Normal  -     Hemoglobin A1C; Future  -     Lipid Panel; Future  -     Microalbumin / Creatinine Urine Ratio; Future  -     CBC; Future  -     Comprehensive Metabolic Panel; Future  -     TSH with Reflex to FT4; Future  -     Hepatitis C Antibody; Future  -     glucose monitoring (FREESTYLE FREEDOM) kit; DAILY Starting Fri 8/26/2022, Disp-1 kit, R-0, Normal  -     blood glucose monitor strips; Test 2 times a day & as needed for symptoms of irregular blood glucose. Dispense sufficient amount for indicated testing frequency plus additional to accommodate PRN testing needs. , Disp-100 strip, R-3, Normal  -     Lancets MISC; DAILY Starting Fri 8/26/2022, Disp-100 each, R-5, Normal  -     empagliflozin (JARDIANCE) 25 MG tablet; Take 1 tablet by mouth daily, Disp-30 tablet, R-3Normal  -     albuterol sulfate HFA (PROVENTIL;VENTOLIN;PROAIR) 108 (90 Base) MCG/ACT inhaler; Inhale 2 puffs into the lungs every 6 hours as needed for Wheezing or Shortness of Breath, Disp-18 g, R-3Normal  2.  Morbid obesity with BMI of 45.0-49.9, adult [facility])  Assessment & Plan:   Follow-up with bariatric surgery, will complete letter of recommendation to proceed with bariatric surgery given uncontrolled diabetes and comorbidities secondary to excessive weight  3. Mixed hyperlipidemia  Assessment & Plan:   Continue statin therapy, diet and exercise counseling done, check labs and adjust dose accordingly  Orders:  -     Lipid Panel; Future  -     TSH with Reflex to FT4; Future  4. Mild intermittent asthma without complication  Assessment & Plan: This appears to be stable on as needed albuterol, her dyspnea on exertion appears to be multifactorial especially with morbid obesity and deconditioning, avoid smoke and known triggers, update flu vaccine in near future  Orders:  -     albuterol sulfate HFA (PROVENTIL;VENTOLIN;PROAIR) 108 (90 Base) MCG/ACT inhaler; Inhale 2 puffs into the lungs every 6 hours as needed for Wheezing or Shortness of Breath, Disp-18 g, R-3Normal  5. Migraine without aura and without status migrainosus, not intractable  Assessment & Plan:   Fairly well-controlled on current combination of gabapentin and Nurtec, continue care and recommendations as per urology  6. Essential hypertension  Assessment & Plan:   Blood pressure stable and well-controlled on current combination of lisinopril with hydrochlorothiazide, continue same along with reinforcing recommendations for salt restriction, weight reduction, healthy diet and regular exercise as other means to help control blood pressure  Orders:  -     CBC; Future  -     Comprehensive Metabolic Panel; Future  7. Chronic GERD  Assessment & Plan:   Continue pantoprazole, reinforced recommendations for antireflux diet, encouraged attempts for weight loss  8. Bipolar affective disorder, current episode depressed, current episode severity unspecified [facility])  Assessment & Plan:   Continue care and recommendation as per psychiatry, patient currently on Latuda with Lexapro and as needed hydroxyzine  9.  Need for 23-polyvalent pneumococcal polysaccharide vaccine  -     albuterol sulfate HFA (PROVENTIL;VENTOLIN;PROAIR) 108 (90 Base) MCG/ACT inhaler; Inhale 2 puffs into the lungs every 6 hours as needed for Wheezing or Shortness of Breath, Disp-18 g, R-3Normal  10. Breast cancer screening by mammogram  -     [facility] DIGITAL SCREEN BILATERAL; Future  11. Obstructive sleep apnea  Assessment & Plan:   Not compliant with CPAP, states could not tolerate, encouraged continue attempts for weight loss  12. Vaginal candidiasis  -     fluconazole (DIFLUCAN) 150 MG tablet; Take 1 tablet by mouth once for 1 dose, Disp-1 tablet, R-0Normal  13. Preventative health care  Assessment & Plan:   Update labs this visit  Diet and exercise recommendation discussed with patient, follow-up with bariatric surgery as scheduled  Patient is due for mammography and orders placed  She is s/p uterine ablation with amenorrhea, she is up-to-date with GYN exam  Vaccination recommendations reviewed and discussed with patient, will need to update pneumonia vaccine and flu vaccine in near future        Return in about 3 months (around 11/26/2022). SUBJECTIVE  HPI:   Patient here to establish new patient office visits, states she used to follow-up with PCP through [address] [id number] then moved to [facility], established with [facility] family medicine in May, now moved back to [place] and is transferring care because this location is much closer. She is a 55-year-old female with known history of uncontrolled diabetes diagnosed about 2 years ago, she has morbid obesity, she is on Social Security disability due to learning disorder, she has bipolar disorder with generalized anxiety and does follow-up with psychiatry at [facility], she also follows up with neurology through [facility] for management of migraine headaches  She reports that she lives by herself, currently not working but will start a new job at [employer] next week.   She does not smoke, denies alcohol consumption. She saw weight management at [facility] last week and started the process for evaluation for bariatric surgery. Currently on Lantus insulin however has not used it in over 2 months due to her running out of pen needles. Only taking Januvia for diabetes, states had allergic reaction to both metformin and glipizide. She does not have a glucometer so she has not been checking her blood sugar. Review of Systems   Constitutional:  Negative for activity change, appetite change, fatigue and unexpected weight change. HENT:  Negative for congestion, hearing loss, mouth sores and sore throat. Respiratory:  Negative for cough, chest tightness, shortness of breath and wheezing. Cardiovascular:  Negative for chest pain, palpitations and leg swelling. Gastrointestinal:  Negative for abdominal pain, constipation, nausea and vomiting. Endocrine: Negative for cold intolerance and heat intolerance. Genitourinary:  Negative for difficulty urinating, dysuria, frequency, hematuria, menstrual problem, urgency, vaginal bleeding and vaginal discharge. Musculoskeletal:  Positive for arthralgias. Negative for back pain, gait problem and joint swelling. Skin:  Negative for color change. Allergic/Immunologic: Negative for environmental allergies and immunocompromised state. Neurological:  Positive for headaches. Negative for dizziness and light-headedness. Psychiatric/Behavioral:  Negative for behavioral problems, dysphoric mood and sleep disturbance. The patient is nervous/anxious. OBJECTIVE:    /88   Pulse (!) 110   Ht 5' 1.18\" (1.554 m)   Wt 253 lb (114.8 kg)   SpO2 98%   BMI 47.52 kg/m²    Physical Exam  Constitutional:       General: She is not in acute distress. Appearance: Normal appearance. She is obese. She is not toxic-appearing. HENT:      Head: Normocephalic.       Mouth/Throat:      Mouth: Mucous membranes are moist.      Pharynx: Oropharynx is clear. Eyes:      Conjunctiva/sclera: Conjunctivae normal.   Cardiovascular:      Rate and Rhythm: Normal rate and regular rhythm. Heart sounds: Normal heart sounds. No murmur heard. Pulmonary:      Effort: Pulmonary effort is normal. No respiratory distress. Breath sounds: Normal breath sounds. No wheezing. Abdominal:      Palpations: Abdomen is soft. Musculoskeletal:         General: Normal range of motion. Cervical back: Neck supple. Right lower leg: No edema. Left lower leg: No edema. Skin:     General: Skin is warm and dry. Neurological:      General: No focal deficit present. Mental Status: She is alert. Cranial Nerves: No cranial nerve deficit. Psychiatric:         Mood and Affect: Mood normal.         Electronically signed by Elizabeth Schroeder MD on 8/26/2022 at 2:22 PM.    This dictation was generated by voice recognition computer software. Although all attempts are made to edit the dictation for accuracy, there may be errors in the transcription that are not intended.

## 2022-08-26 NOTE — ASSESSMENT & PLAN NOTE
Update labs this visit  Diet and exercise recommendation discussed with patient, follow-up with bariatric surgery as scheduled  Patient is due for mammography and orders placed  She is s/p uterine ablation with amenorrhea, she is up-to-date with GYN exam  Vaccination recommendations reviewed and discussed with patient, will need to update pneumonia vaccine and flu vaccine in near future

## 2022-08-26 NOTE — ASSESSMENT & PLAN NOTE
Uncontrolled secondary to noncompliance with treatment plan and diet, I had prolonged discussion with patient regarding serious side effects of uncontrolled diabetes, advised to restart Lantus insulin at 40 units, ordered glucometer and supplies, continue Januvia and add Jardiance even though patient currently having a yeast infection advised this is most likely secondary to uncontrolled diabetes however if urogenital infections become more recurrent after starting Jardiance then will discontinue  We will check labs today and reevaluate in 3 months, if still no significant improvement will probably add semaglutide.   Need for updated eye exam explained to patient

## 2022-08-26 NOTE — ASSESSMENT & PLAN NOTE
This appears to be stable on as needed albuterol, her dyspnea on exertion appears to be multifactorial especially with morbid obesity and deconditioning, avoid smoke and known triggers, update flu vaccine in near future

## 2022-08-26 NOTE — ASSESSMENT & PLAN NOTE
Fairly well-controlled on current combination of gabapentin and Nurtec, continue care and recommendations as per urology

## 2022-08-26 NOTE — ASSESSMENT & PLAN NOTE
Follow-up with bariatric surgery, will complete letter of recommendation to proceed with bariatric surgery given uncontrolled diabetes and comorbidities secondary to excessive weight

## 2022-08-26 NOTE — ASSESSMENT & PLAN NOTE
Continue care and recommendation as per psychiatry, patient currently on Latuda with Lexapro and as needed hydroxyzine

## 2022-08-27 LAB
ESTIMATED AVERAGE GLUCOSE: 274.7 MG/DL
HBA1C MFR BLD: 11.2 %

## 2022-08-29 ENCOUNTER — TELEPHONE (OUTPATIENT)
Dept: INTERNAL MEDICINE CLINIC | Age: 42
End: 2022-08-29

## 2022-08-29 NOTE — TELEPHONE ENCOUNTER
From: Gabriela Gonzalez  To: Dr. Corinne Blase: 8/26/2022 5:01 PM EDT  Subject: Non urgert    I forgot to let you know my neck has been hurting and i was going to see of o can get an MRI referral for my neck and a referral for sleep docter

## 2022-08-29 NOTE — TELEPHONE ENCOUNTER
Can discuss neck pain at her follow-up visit as well as sleep referral however I am pretty sure weight management will usually evaluate for sleep apnea and will refer to sleep medicine if they feel it is needed.   As far as her labs it is what the result note previously mentioned

## 2022-09-02 ENCOUNTER — HOSPITAL ENCOUNTER (EMERGENCY)
Age: 42
Discharge: HOME | End: 2022-09-03
Attending: EMERGENCY MEDICINE
Payer: MEDICARE

## 2022-09-02 DIAGNOSIS — R45.851 SUICIDAL IDEATION: Primary | ICD-10-CM

## 2022-09-02 DIAGNOSIS — E11.65 HYPERGLYCEMIA DUE TO DIABETES MELLITUS (HCC): ICD-10-CM

## 2022-09-02 PROBLEM — F33.9 MAJOR DEPRESSION, RECURRENT (HCC): Status: ACTIVE | Noted: 2022-09-02

## 2022-09-02 LAB
A/G RATIO: 1.1 (ref 1.1–2.2)
ACETAMINOPHEN LEVEL: <5 UG/ML (ref 10–30)
ALBUMIN SERPL-MCNC: 4.4 G/DL (ref 3.4–5)
ALP BLD-CCNC: 172 U/L (ref 40–129)
ALT SERPL-CCNC: 37 U/L (ref 10–40)
AMPHETAMINE SCREEN, URINE: NORMAL
ANION GAP SERPL CALCULATED.3IONS-SCNC: 15 MMOL/L (ref 3–16)
AST SERPL-CCNC: 37 U/L (ref 15–37)
BARBITURATE SCREEN URINE: NORMAL
BASOPHILS ABSOLUTE: 0.2 K/UL (ref 0–0.2)
BASOPHILS RELATIVE PERCENT: 1.5 %
BENZODIAZEPINE SCREEN, URINE: NORMAL
BILIRUB SERPL-MCNC: 0.3 MG/DL (ref 0–1)
BILIRUBIN URINE: NEGATIVE
BLOOD, URINE: NEGATIVE
BUN BLDV-MCNC: 17 MG/DL (ref 7–20)
CALCIUM SERPL-MCNC: 10 MG/DL (ref 8.3–10.6)
CANNABINOID SCREEN URINE: NORMAL
CHLORIDE BLD-SCNC: 88 MMOL/L (ref 99–110)
CLARITY: CLEAR
CO2: 26 MMOL/L (ref 21–32)
COCAINE METABOLITE SCREEN URINE: NORMAL
COLOR: YELLOW
CREAT SERPL-MCNC: 0.9 MG/DL (ref 0.6–1.1)
EOSINOPHILS ABSOLUTE: 0.2 K/UL (ref 0–0.6)
EOSINOPHILS RELATIVE PERCENT: 1.5 %
ETHANOL: NORMAL MG/DL (ref 0–0.08)
FENTANYL SCREEN, URINE: NORMAL
GFR AFRICAN AMERICAN: >60
GFR NON-AFRICAN AMERICAN: >60
GLUCOSE BLD-MCNC: 275 MG/DL (ref 70–99)
GLUCOSE BLD-MCNC: 341 MG/DL (ref 70–99)
GLUCOSE BLD-MCNC: 399 MG/DL (ref 70–99)
GLUCOSE URINE: >=1000 MG/DL
HCG QUALITATIVE: NEGATIVE
HCT VFR BLD CALC: 37.7 % (ref 36–48)
HEMOGLOBIN: 12.7 G/DL (ref 12–16)
KETONES, URINE: NEGATIVE MG/DL
LEUKOCYTE ESTERASE, URINE: NEGATIVE
LYMPHOCYTES ABSOLUTE: 3.5 K/UL (ref 1–5.1)
LYMPHOCYTES RELATIVE PERCENT: 30.1 %
Lab: NORMAL
MCH RBC QN AUTO: 27.7 PG (ref 26–34)
MCHC RBC AUTO-ENTMCNC: 33.7 G/DL (ref 31–36)
MCV RBC AUTO: 82.3 FL (ref 80–100)
METHADONE SCREEN, URINE: NORMAL
MICROSCOPIC EXAMINATION: ABNORMAL
MONOCYTES ABSOLUTE: 0.8 K/UL (ref 0–1.3)
MONOCYTES RELATIVE PERCENT: 6.6 %
NEUTROPHILS ABSOLUTE: 6.9 K/UL (ref 1.7–7.7)
NEUTROPHILS RELATIVE PERCENT: 60.3 %
NITRITE, URINE: NEGATIVE
OPIATE SCREEN URINE: NORMAL
OXYCODONE URINE: NORMAL
PDW BLD-RTO: 14.7 % (ref 12.4–15.4)
PERFORMED ON: ABNORMAL
PERFORMED ON: ABNORMAL
PH UA: 5
PH UA: 5 (ref 5–8)
PHENCYCLIDINE SCREEN URINE: NORMAL
PLATELET # BLD: 339 K/UL (ref 135–450)
PMV BLD AUTO: 7.6 FL (ref 5–10.5)
POTASSIUM SERPL-SCNC: 3.9 MMOL/L (ref 3.5–5.1)
PROTEIN UA: NEGATIVE MG/DL
RBC # BLD: 4.58 M/UL (ref 4–5.2)
SALICYLATE, SERUM: <0.3 MG/DL (ref 15–30)
SARS-COV-2, NAAT: NOT DETECTED
SODIUM BLD-SCNC: 129 MMOL/L (ref 136–145)
SPECIFIC GRAVITY UA: >=1.03 (ref 1–1.03)
TOTAL PROTEIN: 8.3 G/DL (ref 6.4–8.2)
URINE REFLEX TO CULTURE: ABNORMAL
URINE TYPE: ABNORMAL
UROBILINOGEN, URINE: 0.2 E.U./DL
WBC # BLD: 11.5 K/UL (ref 4–11)

## 2022-09-02 PROCEDURE — 80053 COMPREHEN METABOLIC PANEL: CPT

## 2022-09-02 PROCEDURE — 80143 DRUG ASSAY ACETAMINOPHEN: CPT

## 2022-09-02 PROCEDURE — 99285 EMERGENCY DEPT VISIT HI MDM: CPT

## 2022-09-02 PROCEDURE — 96372 THER/PROPH/DIAG INJ SC/IM: CPT

## 2022-09-02 PROCEDURE — 80179 DRUG ASSAY SALICYLATE: CPT

## 2022-09-02 PROCEDURE — 82077 ASSAY SPEC XCP UR&BREATH IA: CPT

## 2022-09-02 PROCEDURE — 87635 SARS-COV-2 COVID-19 AMP PRB: CPT

## 2022-09-02 PROCEDURE — 85025 COMPLETE CBC W/AUTO DIFF WBC: CPT

## 2022-09-02 PROCEDURE — 84703 CHORIONIC GONADOTROPIN ASSAY: CPT

## 2022-09-02 PROCEDURE — 80307 DRUG TEST PRSMV CHEM ANLYZR: CPT

## 2022-09-02 PROCEDURE — 36415 COLL VENOUS BLD VENIPUNCTURE: CPT

## 2022-09-02 PROCEDURE — 6370000000 HC RX 637 (ALT 250 FOR IP): Performed by: NURSE PRACTITIONER

## 2022-09-02 PROCEDURE — 81003 URINALYSIS AUTO W/O SCOPE: CPT

## 2022-09-02 RX ADMIN — INSULIN HUMAN 6 UNITS: 100 INJECTION, SOLUTION PARENTERAL at 21:42

## 2022-09-02 ASSESSMENT — PATIENT HEALTH QUESTIONNAIRE - PHQ9: SUM OF ALL RESPONSES TO PHQ QUESTIONS 1-9: 12

## 2022-09-02 ASSESSMENT — ENCOUNTER SYMPTOMS
ABDOMINAL PAIN: 0
VOMITING: 0
SHORTNESS OF BREATH: 0
CHEST TIGHTNESS: 0
NAUSEA: 0
DIARRHEA: 0

## 2022-09-02 ASSESSMENT — PAIN - FUNCTIONAL ASSESSMENT: PAIN_FUNCTIONAL_ASSESSMENT: NONE - DENIES PAIN
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
## 2022-09-02 NOTE — ED NOTES
Pt resting quietly in bed, no s/s distress noted. Suicide precaution in place.  Zuhair Alvarez at bedside, due to, suicidal ideations. Patient in gown with ties cut off. All belongings given to security. Wallet taken and secured per security security. Board above gurney removed from room for safety. Patients Room is free of all removable equipment and medical supplies and all medical cords/cables removed. Bedside cart locked. Will continue to monitor. A safety risk assessment of the patient environment was conducted and the room was modified for safety. The room is free of all removed equipment, medical supplies, and articles that may pose a threat to the patient or others such as sharp items and needle boxes. Items were removed from unlocked drawers, cabinets are locked when locks are available, extra linens, medical cords, cables, pictures from wall, ect. Are all removed. The patient call light was left in place due to the medical nature of the unit ad the needs of the patient. The patient was place in a room close to the nursing desk. The patient was placed in a hospital gown. A search of the patient and patient environment was performed. Two staff members (including ) conducted a witnessed search of all belongings in the presence of the patient. All personal items including sharp objects, belts, ties, and ingestibles were removed and secured. The patient and family were educated regarding items brought in by family members and visitors will be searched to verify that no potentially dangerous items are brought in to the patient. If a visitor refuses search of items- visitor will be instructed that the items cannot enter patient room. Patient  at bedside. Bed locked and in lowest position with both side rails raised. Call light within reach. Will monitor pt. hourly.      Kath Austin RN  09/02/22 9764

## 2022-09-03 ENCOUNTER — HOSPITAL ENCOUNTER (INPATIENT)
Age: 42
LOS: 2 days | Discharge: HOME | DRG: 885 | End: 2022-09-05
Attending: PSYCHIATRY & NEUROLOGY | Admitting: PSYCHIATRY & NEUROLOGY
Payer: MEDICARE

## 2022-09-03 VITALS
OXYGEN SATURATION: 94 % | DIASTOLIC BLOOD PRESSURE: 83 MMHG | TEMPERATURE: 98.4 F | BODY MASS INDEX: 47.01 KG/M2 | HEART RATE: 100 BPM | SYSTOLIC BLOOD PRESSURE: 152 MMHG | HEIGHT: 61 IN | RESPIRATION RATE: 18 BRPM | WEIGHT: 249 LBS

## 2022-09-03 DIAGNOSIS — E11.9 DIABETES MELLITUS, TYPE II, INSULIN DEPENDENT (HCC): ICD-10-CM

## 2022-09-03 DIAGNOSIS — Z79.4 DIABETES MELLITUS, TYPE II, INSULIN DEPENDENT (HCC): ICD-10-CM

## 2022-09-03 PROBLEM — I15.8 OTHER SECONDARY HYPERTENSION: Status: ACTIVE | Noted: 2022-09-03

## 2022-09-03 PROBLEM — E08.01 DIABETES MELLITUS DUE TO UNDERLYING CONDITION WITH HYPEROSMOLARITY AND COMA, WITHOUT LONG-TERM CURRENT USE OF INSULIN (HCC): Status: ACTIVE | Noted: 2022-09-03

## 2022-09-03 PROBLEM — F81.9 COGNITIVE DEVELOPMENTAL DELAY: Status: ACTIVE | Noted: 2022-09-03

## 2022-09-03 PROBLEM — F32.2 CURRENT SEVERE EPISODE OF MAJOR DEPRESSIVE DISORDER WITHOUT PSYCHOTIC FEATURES (HCC): Status: ACTIVE | Noted: 2022-09-03

## 2022-09-03 LAB
GLUCOSE BLD-MCNC: 223 MG/DL (ref 70–99)
GLUCOSE BLD-MCNC: 279 MG/DL (ref 70–99)
GLUCOSE BLD-MCNC: 303 MG/DL (ref 70–99)
PERFORMED ON: ABNORMAL
TSH SERPL DL<=0.05 MIU/L-ACNC: 1.86 UIU/ML (ref 0.27–4.2)

## 2022-09-03 PROCEDURE — 6370000000 HC RX 637 (ALT 250 FOR IP): Performed by: PSYCHIATRY & NEUROLOGY

## 2022-09-03 PROCEDURE — 83036 HEMOGLOBIN GLYCOSYLATED A1C: CPT

## 2022-09-03 PROCEDURE — 36415 COLL VENOUS BLD VENIPUNCTURE: CPT

## 2022-09-03 PROCEDURE — 80061 LIPID PANEL: CPT

## 2022-09-03 PROCEDURE — 84443 ASSAY THYROID STIM HORMONE: CPT

## 2022-09-03 PROCEDURE — 1240000000 HC EMOTIONAL WELLNESS R&B

## 2022-09-03 PROCEDURE — 99223 1ST HOSP IP/OBS HIGH 75: CPT | Performed by: PSYCHIATRY & NEUROLOGY

## 2022-09-03 RX ORDER — INSULIN GLARGINE 100 [IU]/ML
40 INJECTION, SOLUTION SUBCUTANEOUS 2 TIMES DAILY
Status: DISCONTINUED | OUTPATIENT
Start: 2022-09-03 | End: 2022-09-05 | Stop reason: HOSPADM

## 2022-09-03 RX ORDER — HYDROXYZINE 50 MG/1
50 TABLET, FILM COATED ORAL 3 TIMES DAILY PRN
Status: DISCONTINUED | OUTPATIENT
Start: 2022-09-03 | End: 2022-09-05 | Stop reason: HOSPADM

## 2022-09-03 RX ORDER — GABAPENTIN 300 MG/1
300 CAPSULE ORAL NIGHTLY
Status: DISCONTINUED | OUTPATIENT
Start: 2022-09-03 | End: 2022-09-05 | Stop reason: HOSPADM

## 2022-09-03 RX ORDER — MAGNESIUM HYDROXIDE/ALUMINUM HYDROXICE/SIMETHICONE 120; 1200; 1200 MG/30ML; MG/30ML; MG/30ML
30 SUSPENSION ORAL EVERY 6 HOURS PRN
Status: DISCONTINUED | OUTPATIENT
Start: 2022-09-03 | End: 2022-09-05 | Stop reason: HOSPADM

## 2022-09-03 RX ORDER — SIMVASTATIN 40 MG
80 TABLET ORAL NIGHTLY
Status: DISCONTINUED | OUTPATIENT
Start: 2022-09-03 | End: 2022-09-05 | Stop reason: HOSPADM

## 2022-09-03 RX ORDER — DIPHENHYDRAMINE HYDROCHLORIDE 50 MG/ML
50 INJECTION INTRAMUSCULAR; INTRAVENOUS EVERY 4 HOURS PRN
Status: DISCONTINUED | OUTPATIENT
Start: 2022-09-03 | End: 2022-09-05 | Stop reason: HOSPADM

## 2022-09-03 RX ORDER — OLANZAPINE 5 MG/1
5 TABLET ORAL EVERY 4 HOURS PRN
Status: DISCONTINUED | OUTPATIENT
Start: 2022-09-03 | End: 2022-09-05 | Stop reason: HOSPADM

## 2022-09-03 RX ORDER — POLYETHYLENE GLYCOL 3350 17 G
2 POWDER IN PACKET (EA) ORAL
Status: DISCONTINUED | OUTPATIENT
Start: 2022-09-03 | End: 2022-09-05 | Stop reason: HOSPADM

## 2022-09-03 RX ORDER — ACETAMINOPHEN 325 MG/1
650 TABLET ORAL EVERY 4 HOURS PRN
Status: DISCONTINUED | OUTPATIENT
Start: 2022-09-03 | End: 2022-09-05 | Stop reason: HOSPADM

## 2022-09-03 RX ORDER — ONDANSETRON 4 MG/1
4 TABLET, ORALLY DISINTEGRATING ORAL EVERY 8 HOURS PRN
Status: DISCONTINUED | OUTPATIENT
Start: 2022-09-03 | End: 2022-09-05 | Stop reason: HOSPADM

## 2022-09-03 RX ORDER — ALBUTEROL SULFATE 90 UG/1
2 AEROSOL, METERED RESPIRATORY (INHALATION) EVERY 6 HOURS PRN
Status: DISCONTINUED | OUTPATIENT
Start: 2022-09-03 | End: 2022-09-05 | Stop reason: HOSPADM

## 2022-09-03 RX ORDER — PANTOPRAZOLE SODIUM 40 MG/1
40 TABLET, DELAYED RELEASE ORAL
Status: DISCONTINUED | OUTPATIENT
Start: 2022-09-03 | End: 2022-09-05 | Stop reason: HOSPADM

## 2022-09-03 RX ORDER — TRAZODONE HYDROCHLORIDE 50 MG/1
50 TABLET ORAL NIGHTLY PRN
Status: DISCONTINUED | OUTPATIENT
Start: 2022-09-03 | End: 2022-09-05 | Stop reason: HOSPADM

## 2022-09-03 RX ORDER — LISINOPRIL AND HYDROCHLOROTHIAZIDE 20; 12.5 MG/1; MG/1
1 TABLET ORAL DAILY
Status: DISCONTINUED | OUTPATIENT
Start: 2022-09-03 | End: 2022-09-05 | Stop reason: HOSPADM

## 2022-09-03 RX ORDER — IBUPROFEN 400 MG/1
400 TABLET ORAL EVERY 6 HOURS PRN
Status: DISCONTINUED | OUTPATIENT
Start: 2022-09-03 | End: 2022-09-05 | Stop reason: HOSPADM

## 2022-09-03 RX ADMIN — IBUPROFEN 400 MG: 400 TABLET, FILM COATED ORAL at 05:04

## 2022-09-03 RX ADMIN — LURASIDONE HYDROCHLORIDE 40 MG: 40 TABLET, FILM COATED ORAL at 17:52

## 2022-09-03 RX ADMIN — GABAPENTIN 300 MG: 300 CAPSULE ORAL at 22:06

## 2022-09-03 RX ADMIN — INSULIN GLARGINE 40 UNITS: 100 INJECTION, SOLUTION SUBCUTANEOUS at 09:54

## 2022-09-03 RX ADMIN — PANTOPRAZOLE SODIUM 40 MG: 40 TABLET, DELAYED RELEASE ORAL at 05:49

## 2022-09-03 RX ADMIN — INSULIN GLARGINE 40 UNITS: 100 INJECTION, SOLUTION SUBCUTANEOUS at 22:07

## 2022-09-03 RX ADMIN — LISINOPRIL AND HYDROCHLOROTHIAZIDE 1 TABLET: 12.5; 2 TABLET ORAL at 09:50

## 2022-09-03 ASSESSMENT — PAIN DESCRIPTION - LOCATION: LOCATION: HEAD

## 2022-09-03 ASSESSMENT — PAIN - FUNCTIONAL ASSESSMENT: PAIN_FUNCTIONAL_ASSESSMENT: NONE - DENIES PAIN

## 2022-09-03 ASSESSMENT — SLEEP AND FATIGUE QUESTIONNAIRES
DO YOU USE A SLEEP AID: NO
SLEEP PATTERN: DIFFICULTY FALLING ASLEEP;DIFFICULTY ARISING;DISTURBED/INTERRUPTED SLEEP
DO YOU USE A SLEEP AID: NO
AVERAGE NUMBER OF SLEEP HOURS: 5
AVERAGE NUMBER OF SLEEP HOURS: 5
DO YOU HAVE DIFFICULTY SLEEPING: YES
DO YOU HAVE DIFFICULTY SLEEPING: YES

## 2022-09-03 ASSESSMENT — LIFESTYLE VARIABLES
HOW OFTEN DO YOU HAVE A DRINK CONTAINING ALCOHOL: NEVER
HOW OFTEN DO YOU HAVE A DRINK CONTAINING ALCOHOL: NEVER
HOW MANY STANDARD DRINKS CONTAINING ALCOHOL DO YOU HAVE ON A TYPICAL DAY: PATIENT DOES NOT DRINK
HOW MANY STANDARD DRINKS CONTAINING ALCOHOL DO YOU HAVE ON A TYPICAL DAY: PATIENT DOES NOT DRINK

## 2022-09-03 ASSESSMENT — PAIN SCALES - GENERAL
PAINLEVEL_OUTOF10: 0
PAINLEVEL_OUTOF10: 6

## 2022-09-03 ASSESSMENT — PATIENT HEALTH QUESTIONNAIRE - PHQ9
SUM OF ALL RESPONSES TO PHQ QUESTIONS 1-9: 4
SUM OF ALL RESPONSES TO PHQ QUESTIONS 1-9: 4

## 2022-09-03 ASSESSMENT — PAIN DESCRIPTION - DESCRIPTORS: DESCRIPTORS: ACHING
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
## 2022-09-03 NOTE — BH NOTE
Patient's blood sugar was 279. Patient states, \"I don't check my blood sugar often. \" \"When I check my blood sugars they will run in high 200's or mid 300's. Patient denies dizziness or being lightheaded. Patient not having any S/S of hyperglycemia noted. Patient states, \"I am hungry I haven't eaten for several hours, and could be why I have a headache. \" Patient given a turkey sandwich, vegetables, and fruit. Patient sitting at the table eating.

## 2022-09-03 NOTE — ED NOTES
[facility]. Rounded on patient at this time, all needs addressed.      [facility]idence [facility]  09/02/22 2026

## 2022-09-03 NOTE — ED NOTES
Pt report given to SHAHEEN Contreras, states no questions or concerns.      Crystal Darryle Maus, RN  09/02/22 2208

## 2022-09-03 NOTE — PROGRESS NOTES
Behavioral Services  Medicare Certification Upon Admission    I certify that this patient's inpatient psychiatric hospital admission is medically necessary for:    [x] (1) Treatment which could reasonably be expected to improve this patient's condition,       [x] (2) Or for diagnostic study;     AND     [x](2) The inpatient psychiatric services are provided while the individual is under the care of a physician and are included in the individualized plan of care.     Estimated length of stay/service 3d     Plan for post-hospital care outpt    Electronically signed by Yasir Ford MD on 9/3/2022 at 12:06 PM

## 2022-09-03 NOTE — CARE COORDINATION
22 0814   Psychiatric History   Psychiatric history treatment   (Reports having thoughts of hurting self. Wanted to OD. Reports SS check was cut and does not have all of the rent money)   Contact information [facility]. Ema Kenyon for [facility]   Are there any medication issues? No   Support System   Support system Adequate   Types of Support System Other (Comment); Sister  [facility])   Current Living Situation   Home Living Adequate   Living information Lives alone   Problems with living situation  No   Lack of basic needs No   SSDI/SSI States just go cut because she didnt recert   Other government assistance food stamps   Problems with environment NA   Current abuse issues NA   Relationship problems No   Contact information NA   Medical and Self-Care Issues   Relevant medical problems Diabetes, hypertension, migraines, bi polar, anxiety, PTSD   Relevant self-care issues NA   Barriers to treatment No   Family Constellation   Spouse/partner-name/age NA   Children-names/ages NA   Parents Both    Siblings 2 brothers and a sister. Close with sister   Support services Agency involved(Comment)  (Ema Kenyon)   Childhood   Raised by Trisha Velázquez parent(s)   Trisha Velázquze parents Good Upbringing   Relevant family history NA   History of abuse No   Legal History   Legal history No   Juvenile legal history No   Abuse Assessment   Physical Abuse Denies   Verbal Abuse Denies   Emotional abuse Denies   Financial Abuse Denies   Sexual abuse Denies   Substance Use   Use of substances  No   Education   Education HS graduate -GED   Work History   Currently employed Yes   Cultural and Spiritual   Spiritual concerns No   Cultural concerns No     Completed psychosocial assessment, AT/OT and CSSR-S. Pt states she was having thoughts of hurting self after her SS was cut when she did not recert. She states she did act upon it and is feeling better, pt is just nervous about loosing housing.  Pt is connected MILO Marin [address], MSW, LSW

## 2022-09-03 NOTE — BH NOTE
Patient arrived on the milieu via a stretcher with ambulance attendants accompanying patient @ 02:55. Patient alert and oriented x 3. Patient denies being suicidal and contracts for safety with writer. Patient signed admission paperwork.

## 2022-09-03 NOTE — ED NOTES
This RN assume care of this pt. Pt lying in a bed with eyes closed. Sitter at bedside with call light within reach.             Kleber Sánchez RN  09/02/22 3892

## 2022-09-03 NOTE — ED NOTES
Pt's belongings sent with [facility]. Barry notification of CMT ETA.      Hugo Antoine RN  09/03/22 5918
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
## 2022-09-03 NOTE — PLAN OF CARE
Pt is alert and oriented X4. She is pleasant and cooperative. She is isolative to self. She states she was stressed out before coming here. She states her sister was supposed to come and live with her and didn't. She stated she was upset and wished she would die. She denies any plan or intent. She states they cut her SSDI income to $500 after she started working. She works 4 hours 4 evenings a week. She is worried about her apartment and if she will be evicted. She later called her sister and talked to her. Writer unsure if sister is helping with finances, but pt requested her cell phone and she called her landlord and stated she would have the rent upon leaving. She states she is relieved. She states the [facility] will help her with her SSDI checks. She denies SI/HI/AVH. She is not noted to be RTIS. Problem: Chronic Conditions and Co-morbidities  Goal: Patient's chronic conditions and co-morbidity symptoms are monitored and maintained or improved  Outcome: Not Progressing  Flowsheets (Taken 9/3/2022 1541)  Care Plan - Patient's Chronic Conditions and Co-Morbidity Symptoms are Monitored and Maintained or Improved:   Monitor and assess patient's chronic conditions and comorbid symptoms for stability, deterioration, or improvement   Collaborate with multidisciplinary team to address chronic and comorbid conditions and prevent exacerbation or deterioration   Update acute care plan with appropriate goals if chronic or comorbid symptoms are exacerbated and prevent overall improvement and discharge     Problem: Behavior  Goal: Pt/Family maintain appropriate behavior and adhere to behavioral management agreement, if implemented  Description: INTERVENTIONS:  1. Assess patient/family's coping skills and  non-compliant behavior (including use of illegal substances)  2. Notify security of behavior or suspected illegal substances which indicate the need for search of the family and/or belongings  3.  Encourage verbalization of thoughts and concerns in a socially appropriate manner  4. Utilize positive, consistent limit setting strategies supporting safety of patient, staff and others  5. Encourage participation in the decision making process about the behavioral management agreement  6. If a visitor's behavior poses a threat to safety call refer to organization policy. 7. Initiate consult with , Psychosocial CNS, Spiritual Care as appropriate  Outcome: Not Progressing  Flowsheets (Taken 9/3/2022 0437 by Kristel Bray RN)  Patient/family maintains appropriate behavior and adheres to behavioral management agreement, if implemented:   Encourage verbalization of thoughts and concerns in a socially appropriate manner   Utilize positive, consistent limit setting strategies supporting safety of patient, staff and others   Encourage participation in the decision making process about the behavioral management agreement     Problem: Depression/Self Harm  Goal: Effect of psychiatric condition will be minimized and patient will be protected from self harm  Description: INTERVENTIONS:  1. Assess impact of patient's symptoms on level of functioning, self care needs and offer support as indicated  2. Assess patient/family knowledge of depression, impact on illness and need for teaching  3. Provide emotional support, presence and reassurance  4. Assess for possible suicidal thoughts or ideation. If patient expresses suicidal thoughts or statements do not leave alone, initiate Suicide Precautions, move to a room close to the nursing station and obtain sitter  5.  Initiate consults as appropriate with Mental Health Professional, Spiritual Care, Psychosocial CNS, and consider a recommendation to the LIP for a Psychiatric Consultation  Outcome: Not Progressing  Flowsheets  Taken 9/3/2022 1623 by Jacquie Camejo RN  Effect of psychiatric condition will be minimized and patient will be protected from self harm:   Assess impact of patients symptoms on level of functioning, self care needs and offer support as indicated   Assess patient/family knowledge of depression, impact on illness and need for teaching   Provide emotional support, presence and reassurance   Assess for suicidal thoughts or ideation. If patient expresses suicidal thoughts or statements do not leave alone, initiate Suicide Precautions, move near nurse station, obtain sitter  Taken 9/3/2022 1541 by Thomas Toure RN  Effect of psychiatric condition will be minimized and patient will be protected from self harm:   Assess impact of patients symptoms on level of functioning, self care needs and offer support as indicated   Assess patient/family knowledge of depression, impact on illness and need for teaching   Provide emotional support, presence and reassurance   Assess for suicidal thoughts or ideation. If patient expresses suicidal thoughts or statements do not leave alone, initiate Suicide Precautions, move near nurse station, obtain sitter  Taken 9/3/2022 0437 by Robert Llamas RN  Effect of psychiatric condition will be minimized and patient will be protected from self harm:   Provide emotional support, presence and reassurance   Assess patient/family knowledge of depression, impact on illness and need for teaching     Problem: Involuntary Admit  Goal: Will cooperate with staff recommendations and doctor's orders and will demonstrate appropriate behavior  Description: INTERVENTIONS:  1. Treat underlying conditions and offer medication as ordered  2. Educate regarding involuntary admission procedures and rules  3.  Contain excessive/inappropriate behavior per unit and hospital policies  Outcome: Not Progressing  Flowsheets  Taken 9/3/2022 1541 by Thomas Toure RN  Will cooperate with staff recommendations and doctor's orders and will demonstrate appropriate behavior:   Treat underlying conditions and offer medication as ordered   Contain excessive/inappropriate behavior per unit and hospital policies   Educate regarding involuntary admission procedures and rules  Taken 9/3/2022 0437 by Sisi Mayer, RN  Will cooperate with staff recommendations and doctor's orders and will demonstrate appropriate behavior: Treat underlying conditions and offer medication as ordered

## 2022-09-03 NOTE — ED NOTES
Rounded on patient, resting quietly in bed, all needs addressed at this time     Juan Pathak, SHAHEEN  09/02/22 2127       Kareem Leong RN  09/02/22 2146

## 2022-09-04 PROBLEM — Z00.00 ENCOUNTER FOR ROUTINE ADULT MEDICAL EXAMINATION: Status: ACTIVE | Noted: 2022-09-04

## 2022-09-04 LAB
CHOLESTEROL, TOTAL: 170 MG/DL (ref 0–199)
ESTIMATED AVERAGE GLUCOSE: 274.7 MG/DL
GLUCOSE BLD-MCNC: 225 MG/DL (ref 70–99)
GLUCOSE BLD-MCNC: 259 MG/DL (ref 70–99)
GLUCOSE BLD-MCNC: 378 MG/DL (ref 70–99)
HBA1C MFR BLD: 11.2 %
HDLC SERPL-MCNC: 30 MG/DL (ref 40–60)
LDL CHOLESTEROL CALCULATED: 82 MG/DL
PERFORMED ON: ABNORMAL
TRIGL SERPL-MCNC: 292 MG/DL (ref 0–150)
VLDLC SERPL CALC-MCNC: 58 MG/DL

## 2022-09-04 PROCEDURE — 6370000000 HC RX 637 (ALT 250 FOR IP)

## 2022-09-04 PROCEDURE — 1240000000 HC EMOTIONAL WELLNESS R&B

## 2022-09-04 PROCEDURE — 6370000000 HC RX 637 (ALT 250 FOR IP): Performed by: PSYCHIATRY & NEUROLOGY

## 2022-09-04 PROCEDURE — 99222 1ST HOSP IP/OBS MODERATE 55: CPT

## 2022-09-04 RX ORDER — INSULIN LISPRO 100 [IU]/ML
0-8 INJECTION, SOLUTION INTRAVENOUS; SUBCUTANEOUS
Status: DISCONTINUED | OUTPATIENT
Start: 2022-09-04 | End: 2022-09-05 | Stop reason: HOSPADM

## 2022-09-04 RX ORDER — INSULIN LISPRO 100 [IU]/ML
0-4 INJECTION, SOLUTION INTRAVENOUS; SUBCUTANEOUS NIGHTLY
Status: DISCONTINUED | OUTPATIENT
Start: 2022-09-04 | End: 2022-09-05 | Stop reason: HOSPADM

## 2022-09-04 RX ORDER — DEXTROSE MONOHYDRATE 100 MG/ML
INJECTION, SOLUTION INTRAVENOUS CONTINUOUS PRN
Status: DISCONTINUED | OUTPATIENT
Start: 2022-09-04 | End: 2022-09-05 | Stop reason: HOSPADM

## 2022-09-04 RX ADMIN — INSULIN LISPRO 4 UNITS: 100 INJECTION, SOLUTION INTRAVENOUS; SUBCUTANEOUS at 21:46

## 2022-09-04 RX ADMIN — IBUPROFEN 400 MG: 400 TABLET, FILM COATED ORAL at 21:23

## 2022-09-04 RX ADMIN — INSULIN LISPRO 2 UNITS: 100 INJECTION, SOLUTION INTRAVENOUS; SUBCUTANEOUS at 17:53

## 2022-09-04 RX ADMIN — LISINOPRIL AND HYDROCHLOROTHIAZIDE 1 TABLET: 12.5; 2 TABLET ORAL at 09:30

## 2022-09-04 RX ADMIN — LURASIDONE HYDROCHLORIDE 80 MG: 40 TABLET, FILM COATED ORAL at 17:51

## 2022-09-04 RX ADMIN — EMPAGLIFLOZIN 25 MG: 10 TABLET, FILM COATED ORAL at 10:19

## 2022-09-04 RX ADMIN — GABAPENTIN 300 MG: 300 CAPSULE ORAL at 21:23

## 2022-09-04 RX ADMIN — INSULIN GLARGINE 40 UNITS: 100 INJECTION, SOLUTION SUBCUTANEOUS at 21:46

## 2022-09-04 RX ADMIN — INSULIN GLARGINE 40 UNITS: 100 INJECTION, SOLUTION SUBCUTANEOUS at 09:30

## 2022-09-04 RX ADMIN — PANTOPRAZOLE SODIUM 40 MG: 40 TABLET, DELAYED RELEASE ORAL at 06:52

## 2022-09-04 RX ADMIN — INSULIN LISPRO 4 UNITS: 100 INJECTION, SOLUTION INTRAVENOUS; SUBCUTANEOUS at 12:32

## 2022-09-04 ASSESSMENT — PAIN DESCRIPTION - DESCRIPTORS: DESCRIPTORS: ACHING

## 2022-09-04 ASSESSMENT — PAIN SCALES - GENERAL
PAINLEVEL_OUTOF10: 4
PAINLEVEL_OUTOF10: 0

## 2022-09-04 ASSESSMENT — PAIN DESCRIPTION - LOCATION: LOCATION: GENERALIZED

## 2022-09-04 ASSESSMENT — PAIN - FUNCTIONAL ASSESSMENT: PAIN_FUNCTIONAL_ASSESSMENT: ACTIVITIES ARE NOT PREVENTED

## 2022-09-04 NOTE — PROGRESS NOTES
Pt woke up, c/o lightheadeness. Vitals taken: /84 and HR 97, O2 95%. Pt was concerned because her BP was on the lower end yesterday. Advised to sit up slowly from bed to avoid becoming dizzy. Instructed to advise staff if she has any further symptoms. Pt verbalized understanding.  Pt's nurse advised of VS.

## 2022-09-04 NOTE — PLAN OF CARE
Problem: Chronic Conditions and Co-morbidities  Goal: Patient's chronic conditions and co-morbidity symptoms are monitored and maintained or improved  9/4/2022 0305 by Nir Stubbs RN  Outcome: Progressing  9/3/2022 1624 by Estella Bailey RN  Outcome: Not Progressing  Flowsheets (Taken 9/3/2022 1541)  Care Plan - Patient's Chronic Conditions and Co-Morbidity Symptoms are Monitored and Maintained or Improved:   Monitor and assess patient's chronic conditions and comorbid symptoms for stability, deterioration, or improvement   Collaborate with multidisciplinary team to address chronic and comorbid conditions and prevent exacerbation or deterioration   Update acute care plan with appropriate goals if chronic or comorbid symptoms are exacerbated and prevent overall improvement and discharge     Problem: Behavior  Goal: Pt/Family maintain appropriate behavior and adhere to behavioral management agreement, if implemented  Description: INTERVENTIONS:  1. Assess patient/family's coping skills and  non-compliant behavior (including use of illegal substances)  2. Notify security of behavior or suspected illegal substances which indicate the need for search of the family and/or belongings  3. Encourage verbalization of thoughts and concerns in a socially appropriate manner  4. Utilize positive, consistent limit setting strategies supporting safety of patient, staff and others  5. Encourage participation in the decision making process about the behavioral management agreement  6. If a visitor's behavior poses a threat to safety call refer to organization policy. 7. Initiate consult with , Psychosocial CNS, Spiritual Care as appropriate  9/4/2022 0305 by Nir Stubbs RN  Outcome: Progressing   Problem: Depression/Self Harm  Goal: Effect of psychiatric condition will be minimized and patient will be protected from self harm  Description: INTERVENTIONS:  1.  Assess impact of patient's symptoms on level of functioning, self care needs and offer support as indicated  2. Assess patient/family knowledge of depression, impact on illness and need for teaching  3. Provide emotional support, presence and reassurance  4. Assess for possible suicidal thoughts or ideation. If patient expresses suicidal thoughts or statements do not leave alone, initiate Suicide Precautions, move to a room close to the nursing station and obtain sitter  5. Initiate consults as appropriate with Mental Health Professional, Spiritual Care, Psychosocial CNS, and consider a recommendation to the LIP for a Psychiatric Consultation  9/4/2022 0305 by Lynn Rm RN  Outcome: Progressing  9/3/2022 1624 by Luana Sanders RN  Outcome: Not Progressing  Flowsheets  Taken 9/3/2022 1623 by Luana Sanders RN  Effect of psychiatric condition will be minimized and patient will be protected from self harm:   Assess impact of patients symptoms on level of functioning, self care needs and offer support as indicated   Assess patient/family knowledge of depression, impact on illness and need for teaching   Provide emotional support, presence and reassurance   Assess for suicidal thoughts or ideation. If patient expresses suicidal thoughts or statements do not leave alone, initiate Suicide Precautions, move near nurse station, obtain sitter  Taken 9/3/2022 1541 by Luana Sanders RN  Effect of psychiatric condition will be minimized and patient will be protected from self harm:   Assess impact of patients symptoms on level of functioning, self care needs and offer support as indicated   Assess patient/family knowledge of depression, impact on illness and need for teaching   Provide emotional support, presence and reassurance   Assess for suicidal thoughts or ideation.  If patient expresses suicidal thoughts or statements do not leave alone, initiate Suicide Precautions, move near nurse station, obtain sitter  Problem: Involuntary Admit  Goal: Will cooperate with staff recommendations and doctor's orders and will demonstrate appropriate behavior  Description: INTERVENTIONS:  1. Treat underlying conditions and offer medication as ordered  2. Educate regarding involuntary admission procedures and rules  3. Contain excessive/inappropriate behavior per unit and hospital policies  4/7/7562 [id number] by Travis Blackburn RN  Outcome: Progressing  Patient denied SI/HI,A/V hallucinations this evening. She is asked to come to the staff if thoughts of self harm were to occur. Patient is cooperative with vital signs and blood sugars. Rachana Fowler was withdrawn and isolated to her room. Her behavior has been appropriate. She is noted to be withdrawn and isolated to her room. Unable to attend groups due to being tired. She is instructed to arise slowly when getting up from bed due to her blood pressure. She agrees to get up from bed slowly to prevent any fainting. Safety checks continue Q 15 minutes throughout the shift.
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
## 2022-09-04 NOTE — PLAN OF CARE
Pt is alert and oriented. She is pleasant and cooperative. She has been euthymic today. She denies SI/HI and AVH. She has not been noted to be RTIS. She has been out of her room sitting in the common area most of the day thus far. She is social, but shy around the other pts. She wishes to discuss D/C with the MD tomorrow.

## 2022-09-05 VITALS
OXYGEN SATURATION: 95 % | WEIGHT: 249 LBS | TEMPERATURE: 98 F | HEART RATE: 103 BPM | RESPIRATION RATE: 16 BRPM | SYSTOLIC BLOOD PRESSURE: 145 MMHG | DIASTOLIC BLOOD PRESSURE: 82 MMHG | HEIGHT: 61 IN | BODY MASS INDEX: 47.01 KG/M2

## 2022-09-05 LAB
GLUCOSE BLD-MCNC: 186 MG/DL (ref 70–99)
PERFORMED ON: ABNORMAL

## 2022-09-05 PROCEDURE — 5130000000 HC BRIDGE APPOINTMENT

## 2022-09-05 PROCEDURE — 6370000000 HC RX 637 (ALT 250 FOR IP)

## 2022-09-05 PROCEDURE — 99239 HOSP IP/OBS DSCHRG MGMT >30: CPT | Performed by: PSYCHIATRY & NEUROLOGY

## 2022-09-05 PROCEDURE — 6370000000 HC RX 637 (ALT 250 FOR IP): Performed by: PSYCHIATRY & NEUROLOGY

## 2022-09-05 RX ORDER — INSULIN LISPRO 100 [IU]/ML
5 INJECTION, SOLUTION INTRAVENOUS; SUBCUTANEOUS
Status: DISCONTINUED | OUTPATIENT
Start: 2022-09-05 | End: 2022-09-05 | Stop reason: HOSPADM

## 2022-09-05 RX ORDER — INSULIN GLARGINE 100 [IU]/ML
40 INJECTION, SOLUTION SUBCUTANEOUS 2 TIMES DAILY
Qty: 72 ML | Refills: 0
Start: 2022-09-05 | End: 2023-09-05

## 2022-09-05 RX ADMIN — INSULIN LISPRO 5 UNITS: 100 INJECTION, SOLUTION INTRAVENOUS; SUBCUTANEOUS at 08:38

## 2022-09-05 RX ADMIN — EMPAGLIFLOZIN 25 MG: 10 TABLET, FILM COATED ORAL at 08:40

## 2022-09-05 RX ADMIN — PANTOPRAZOLE SODIUM 40 MG: 40 TABLET, DELAYED RELEASE ORAL at 06:56

## 2022-09-05 RX ADMIN — LISINOPRIL AND HYDROCHLOROTHIAZIDE 1 TABLET: 12.5; 2 TABLET ORAL at 08:40

## 2022-09-05 RX ADMIN — INSULIN GLARGINE 40 UNITS: 100 INJECTION, SOLUTION SUBCUTANEOUS at 08:38

## 2022-09-05 NOTE — DISCHARGE INSTRUCTIONS
Advanced Directives:  Patient does not have a surrogate decision maker appointed   Name (if yes): *** Phone Number: ***  Patient does not have a psychiatric and/ or medical advanced directive or power of . Patient was not offered psychiatric and/ or medical advanced directive or power of  information/completion but declined to complete   Why not? Patient declined    Discharge Planning is Complete. Discharge Date: 9/5/2022  Reason for Hospitalization: major depression  Discharge Diagnosis: major depression, recurrent  Discharging to: home    Your instructions: Your physician here was Swati Payne MD. If you have any questions please call the unit at [phone] for the adult unit or [phone] for [facility]. Please note that we have a patient family advisory [place] that meets the second Wednesday of January and the second Wednesday of July at [address] [facility] in [place] at [facility]. Department leadership would love for you to attend to give feedback on what we are doing well and areas in which we can improve our patient care. Please come if you are able and feel free to reach out to [facility] at [phone] with any questions. The crisis number for [facility] is [phone]. You can use this number at any time to access emergency mental health services. Please follow up with your PCP regarding any pending labs.      Your next appointment is:  Name of Provider:  Dr. Richard Hargrove  Provider specialty/license: MD  Date and time of appointment: you indicated that you have an appointment in 3 months, please call to schedule another appointment if needed  The type/s of services requested are: medication management  Agency name: [facility]  Address: [address] [address]  Phone Number: [phone]  Special instructions (what to bring to appointment, etc.): discharge instructions from this clears

## 2022-09-05 NOTE — PLAN OF CARE
Problem: Chronic Conditions and Co-morbidities  Goal: Patient's chronic conditions and co-morbidity symptoms are monitored and maintained or improved  9/5/2022 0025 by Basilio Mackay RN  Outcome: Progressing     Problem: Behavior  Goal: Pt/Family maintain appropriate behavior and adhere to behavioral management agreement, if implemented  Description: INTERVENTIONS:  1. Assess patient/family's coping skills and  non-compliant behavior (including use of illegal substances)  2. Notify security of behavior or suspected illegal substances which indicate the need for search of the family and/or belongings  3. Encourage verbalization of thoughts and concerns in a socially appropriate manner  4. Utilize positive, consistent limit setting strategies supporting safety of patient, staff and others  5. Encourage participation in the decision making process about the behavioral management agreement  6. If a visitor's behavior poses a threat to safety call refer to organization policy. 7. Initiate consult with , Psychosocial CNS, Spiritual Care as appropriate  9/5/2022 0025 by Basilio Mackay RN  Outcome: Progressing     Problem: Depression/Self Harm  Goal: Effect of psychiatric condition will be minimized and patient will be protected from self harm  Description: INTERVENTIONS:  1. Assess impact of patient's symptoms on level of functioning, self care needs and offer support as indicated  2. Assess patient/family knowledge of depression, impact on illness and need for teaching  3. Provide emotional support, presence and reassurance  4. Assess for possible suicidal thoughts or ideation. If patient expresses suicidal thoughts or statements do not leave alone, initiate Suicide Precautions, move to a room close to the nursing station and obtain sitter  5.  Initiate consults as appropriate with Mental Health Professional, Spiritual Care, Psychosocial CNS, and consider a recommendation to the LIP for a Psychiatric Consultation  9/5/2022 0025 by Lori Lizama RN  Outcome: Progressing     Problem: Depression/Self Harm  Goal: Effect of psychiatric condition will be minimized and patient will be protected from self harm  Description: INTERVENTIONS:  1. Assess impact of patient's symptoms on level of functioning, self care needs and offer support as indicated  2. Assess patient/family knowledge of depression, impact on illness and need for teaching  3. Provide emotional support, presence and reassurance  4. Assess for possible suicidal thoughts or ideation. If patient expresses suicidal thoughts or statements do not leave alone, initiate Suicide Precautions, move to a room close to the nursing station and obtain sitter  5. Initiate consults as appropriate with Mental Health Professional, Spiritual Care, Psychosocial CNS, and consider a recommendation to the LIP for a Psychiatric Consultation  9/5/2022 0025 by Lori Lizama RN  Outcome: Progressing   Patient is cooperative with care. She is withdrawn and resting quietly in bed. She denies SI/HI, A/V hallucinations. Finger stick is completed, insulin given as ordered. Pleasant and appreciative of care provided. Patient was encouraged to come out to the day room. Nii is feeling more comfortable on the unit, she is coming out of her room more tonight and interacting with staff. She is to come to the staff if thoughts of self harm were to occur. She is hoping to get a good nights sleep. Safety checks continue Q 15 minutes through out the shift.

## 2022-09-05 NOTE — PLAN OF CARE
Problem: Behavior  Goal: Pt/Family maintain appropriate behavior and adhere to behavioral management agreement, if implemented  Description: INTERVENTIONS:  1. Assess patient/family's coping skills and  non-compliant behavior (including use of illegal substances)  2. Notify security of behavior or suspected illegal substances which indicate the need for search of the family and/or belongings  3. Encourage verbalization of thoughts and concerns in a socially appropriate manner  4. Utilize positive, consistent limit setting strategies supporting safety of patient, staff and others  5. Encourage participation in the decision making process about the behavioral management agreement  6. If a visitor's behavior poses a threat to safety call refer to organization policy. 7. Initiate consult with , Psychosocial CNS, Spiritual Care as appropriate  9/5/2022 1107 by Janus Hodgkins, RN  Outcome: Progressing   Pt has been up and visible this morning. She is preparing for discharge. She denies suicidal or homicidal ideations. She denies having any hallucinations. She is pleasant and cooperative with interview and appropriate on unit.

## 2022-09-05 NOTE — PROGRESS NOTES
[address] [facility]  Discharge Note    Pt discharged with followings belongings:   Dental Appliances: None  Vision - Corrective Lenses: None  Hearing Aid: None  Jewelry: None  Body Piercings Removed: No  Clothing: Footwear, Pants, Shirt, Socks, Undergarments, At bedside  Other Valuables: Money, Purse, ORTIZ, Other (Comment) (secured in the safe)   Valuables sent home with patient. Patient education on aftercare instructions: reviewed with patient  Information faxed to [facility] by Andrey Fischer RN  at 12:15 PM .Patient verbalize understanding of AVS:  and a copy was given to her at discharge. Status EXAM upon discharge:  Mental Status and Behavioral Exam  Normal: Yes  Level of Assistance: Independent/Self  Facial Expression:  (bright)  Affect: Appropriate  Level of Consciousness: Alert  Frequency of Checks: 4 times per hour, close  Mood:Normal: Yes  Mood: Other (comment) (wnl)  Motor Activity:Normal: Yes  Motor Activity: Increased  Eye Contact: Good  Observed Behavior: Cooperative  Sexual Misconduct History: Current - no  Involved In Any Sexual Misconduct With Others? : No  History of Sexually Inappropriate Behavior When Previously Hospitalized?: No  Uncontrollable/Compulsive Masturbation?: No  Difficulty Controlling Sexual Impulses?: No  Preception: [place] to person, [place] to time, [place] to place, [place] to situation  Attention:Normal: Yes  Thought Processes: Circumstantial  Thought Content:Normal: Yes  Thought Content: Other (comment) (linear)  Depression Symptoms: No problems reported or observed. Anxiety Symptoms: No problems reported or observed. Jennifer Symptoms: No problems reported or observed.   Hallucinations: None  Delusions: No  Memory:Normal: Yes  Memory: Other (comment) (wnl)  Insight and Judgment: No  Insight and Judgment: Poor judgment, Poor insight    Tobacco Screening:  Practical Counseling, on admission, issac X, if applicable and completed (first 3 are required if patient doesn't refuse):            ( ) Recognizing danger situations (included triggers and roadblocks)                    ( ) Coping skills (new ways to manage stress,relaxation techniques, changing routine, distraction)                                                           ( ) Basic information about quitting (benefits of quitting, techniques in how to quit, available resources  ( ) Referral for counseling faxed to [facility]                                                                                                                   (X ) Patient refused counseling  ( X) Patient refused referral  ( ) Patient refused prescription upon discharge  ( ) Patient has not smoked in the last 30 days    Metabolic Screening:    Lab Results   Component Value Date    LABA1C 11.2 09/03/2022       Lab Results   Component Value Date    CHOL 170 09/03/2022    CHOL 220 (H) 08/26/2022    CHOL 178 12/16/2021    CHOL 163 10/28/2020    CHOL 203 (H) 10/17/2019    CHOL 206 (H) 11/13/2018    CHOL 208 (H) 07/16/2014    CHOL 182 03/12/2014    CHOL 156 07/31/2012     Lab Results   Component Value Date    TRIG 292 (H) 09/03/2022    TRIG 456 (H) 08/26/2022    TRIG 290 (H) 12/16/2021    TRIG 220 (H) 10/28/2020    TRIG 224 (H) 10/17/2019    TRIG 254 (H) 11/13/2018    TRIG 185 (H) 07/16/2014    TRIG 109 03/12/2014    TRIG 115 07/31/2012     Lab Results   Component Value Date    HDL 30 (L) 09/03/2022    HDL 34 (L) 08/26/2022    HDL 33 (L) 12/16/2021    HDL 39 (L) 10/28/2020    HDL 46 10/17/2019    HDL 43 11/13/2018    HDL 39 (L) 07/16/2014    HDL 40 03/12/2014    HDL 39 (L) 07/31/2012     No components found for: LDLCAL  Lab Results   Component Value Date    LABVLDL 58 09/03/2022    LABVLDL see below 08/26/2022    LABVLDL 58 12/16/2021    LABVLDL 44 10/28/2020    LABVLDL 45 10/17/2019    LABVLDL 51 11/13/2018    LABVLDL 37 07/16/2014    LABVLDL 23 07/31/2012       Riana Brewer RN

## 2022-09-05 NOTE — PROGRESS NOTES
Bridge Appointment completed: Reviewed Discharge Instructions with patient. Patient verbalizes understanding and agreement with the discharge plan using the teachback method.      Referral for Outpatient Tobacco Cessation Counseling, upon discharge (issac X if applicable and completed):    ( )  Hospital staff assisted patient to call Quit Line or faxed referral                                   during hospitalization                  ( )  Recognizing danger situations (included triggers and roadblocks), if not completed on admission                    ( )  Coping skills (new ways to manage stress, exercise, relaxation techniques, changing routine, distraction), if not completed on admission                                                           ( )  Basic information about quitting (benefits of quitting, techniques in how to quit, available resources, if not completed on admission  ( ) Referral for counseling faxed to [facility]   ( X) Patient refused referral  (X ) Patient refused counseling  ( ) Patient refused smoking cessation medication upon discharge    Vaccinations (issac X if applicable and completed):  ( ) Patient states already received influenza vaccine elsewhere  ( ) Patient received influenza vaccine during this hospitalization  (X ) Patient refused influenza vaccine at this time

## 2022-09-06 ENCOUNTER — FOLLOWUP TELEPHONE ENCOUNTER (OUTPATIENT)
Dept: PSYCHIATRY | Age: 42
End: 2022-09-06

## 2022-09-09 ENCOUNTER — PATIENT MESSAGE (OUTPATIENT)
Dept: INTERNAL MEDICINE CLINIC | Age: 42
End: 2022-09-09

## 2022-09-09 ENCOUNTER — OFFICE VISIT (OUTPATIENT)
Dept: INTERNAL MEDICINE CLINIC | Age: 42
End: 2022-09-09
Payer: MEDICARE

## 2022-09-09 VITALS
OXYGEN SATURATION: 97 % | SYSTOLIC BLOOD PRESSURE: 136 MMHG | HEART RATE: 92 BPM | DIASTOLIC BLOOD PRESSURE: 82 MMHG | WEIGHT: 254.2 LBS | HEIGHT: 61 IN | BODY MASS INDEX: 47.99 KG/M2

## 2022-09-09 DIAGNOSIS — F31.30 BIPOLAR AFFECTIVE DISORDER, CURRENT EPISODE DEPRESSED, CURRENT EPISODE SEVERITY UNSPECIFIED (HCC): ICD-10-CM

## 2022-09-09 DIAGNOSIS — I10 ESSENTIAL HYPERTENSION: ICD-10-CM

## 2022-09-09 DIAGNOSIS — J45.20 MILD INTERMITTENT ASTHMA WITHOUT COMPLICATION: ICD-10-CM

## 2022-09-09 DIAGNOSIS — Z79.4 TYPE 2 DIABETES MELLITUS WITHOUT COMPLICATION, WITH LONG-TERM CURRENT USE OF INSULIN (HCC): Primary | ICD-10-CM

## 2022-09-09 DIAGNOSIS — E11.9 TYPE 2 DIABETES MELLITUS WITHOUT COMPLICATION, WITH LONG-TERM CURRENT USE OF INSULIN (HCC): Primary | ICD-10-CM

## 2022-09-09 DIAGNOSIS — F70 MILD INTELLECTUAL DISABILITY: ICD-10-CM

## 2022-09-09 DIAGNOSIS — F41.9 ANXIETY: ICD-10-CM

## 2022-09-09 DIAGNOSIS — F33.2 SEVERE EPISODE OF RECURRENT MAJOR DEPRESSIVE DISORDER, WITHOUT PSYCHOTIC FEATURES (HCC): ICD-10-CM

## 2022-09-09 PROCEDURE — 1036F TOBACCO NON-USER: CPT | Performed by: NURSE PRACTITIONER

## 2022-09-09 PROCEDURE — 1111F DSCHRG MED/CURRENT MED MERGE: CPT | Performed by: NURSE PRACTITIONER

## 2022-09-09 PROCEDURE — 3046F HEMOGLOBIN A1C LEVEL >9.0%: CPT | Performed by: NURSE PRACTITIONER

## 2022-09-09 PROCEDURE — 2022F DILAT RTA XM EVC RTNOPTHY: CPT | Performed by: NURSE PRACTITIONER

## 2022-09-09 PROCEDURE — G8427 DOCREV CUR MEDS BY ELIG CLIN: HCPCS | Performed by: NURSE PRACTITIONER

## 2022-09-09 PROCEDURE — 99214 OFFICE O/P EST MOD 30 MIN: CPT | Performed by: NURSE PRACTITIONER

## 2022-09-09 PROCEDURE — G8417 CALC BMI ABV UP PARAM F/U: HCPCS | Performed by: NURSE PRACTITIONER

## 2022-09-09 RX ORDER — ESCITALOPRAM OXALATE 5 MG/1
TABLET ORAL
COMMUNITY
Start: 2022-09-01

## 2022-09-09 RX ORDER — HYDROXYZINE HYDROCHLORIDE 25 MG/1
25 TABLET, FILM COATED ORAL EVERY 8 HOURS PRN
Qty: 30 TABLET | Refills: 0 | Status: SHIPPED | OUTPATIENT
Start: 2022-09-09 | End: 2022-09-19

## 2022-09-09 RX ORDER — INSULIN LISPRO 100 [IU]/ML
5 INJECTION, SOLUTION INTRAVENOUS; SUBCUTANEOUS
Qty: 5 ADJUSTABLE DOSE PRE-FILLED PEN SYRINGE | Refills: 1 | Status: SHIPPED | OUTPATIENT
Start: 2022-09-09

## 2022-09-09 NOTE — PROGRESS NOTES
9/9/22     Chief Complaint   Patient presents with    Diabetes     Was 379 on 9/8 and 355 on 9/9     HPI        Allergies   Allergen Reactions    Benztropine Anaphylaxis and Shortness Of Breath    Dulaglutide Rash and Anaphylaxis     rash        Fluoxetine Other (See Comments)     Hearing Voices , weird feeling       Glipizide Rash     rash  rash      Metformin Nausea And Vomiting, Nausea Only, Rash and Shortness Of Breath     Rash & nausea      Sertraline Anaphylaxis and Rash    Oxycodone-Acetaminophen Itching    Sertraline Hcl Rash    Tape [Adhesive Tape] Rash and Other (See Comments)     redness     Current Outpatient Medications   Medication Sig Dispense Refill    escitalopram (LEXAPRO) 5 MG tablet TAKE 1 TABLET BY MOUTH EVERY MORNING      lurasidone (LATUDA) 80 MG TABS tablet Take 1 tablet by mouth Daily with supper 30 tablet 0    insulin glargine (LANTUS SOLOSTAR) 100 UNIT/ML injection pen Inject 40 Units into the skin 2 times daily 72 mL 0    gabapentin (NEURONTIN) 300 MG capsule TAKE 1 CAPSULE BY MOUTH AT BEDTIME      Lancets MISC 1 each by Does not apply route daily 100 each 5    empagliflozin (JARDIANCE) 25 MG tablet Take 1 tablet by mouth daily 30 tablet 3    albuterol sulfate HFA (PROVENTIL;VENTOLIN;PROAIR) 108 (90 Base) MCG/ACT inhaler Inhale 2 puffs into the lungs every 6 hours as needed for Wheezing or Shortness of Breath 18 g 3    ondansetron (ZOFRAN ODT) 4 MG disintegrating tablet Take 1 tablet by mouth every 8 hours as needed for Nausea 20 tablet 0    SITagliptin (JANUVIA) 100 MG tablet Take 1 tablet by mouth daily 30 tablet 0    lisinopril-hydroCHLOROthiazide (PRINZIDE;ZESTORETIC) 20-12.5 MG per tablet Take 1 tablet by mouth daily 90 tablet 3    simvastatin (ZOCOR) 5 MG tablet Take 1 tablet by mouth nightly 90 tablet 3    pantoprazole (PROTONIX) 40 MG tablet Take 1 tablet by mouth every morning (before breakfast) 30 tablet 5    Rimegepant Sulfate (NURTEC) 75 MG TBDP Take 75 mg by mouth as needed Insulin Pen Needle 31G X 5 MM MISC 1 each by Does not apply route daily 100 each 3    glucose monitoring (FREESTYLE FREEDOM) kit 1 kit by Does not apply route daily 1 kit 0    blood glucose monitor strips Test 2 times a day & as needed for symptoms of irregular blood glucose. Dispense sufficient amount for indicated testing frequency plus additional to accommodate PRN testing needs. 100 strip 3    blood glucose monitor kit and supplies Dispense sufficient amount for indicated testing frequency plus additional to accommodate PRN testing needs. Dispense all needed supplies to include: monitor, strips, lancing device, lancets, control solutions, alcohol swabs. 1 kit 0    [employer] Lancets Thin 26G MISC TEST 3 TIMES A DAY AND AS NEEDED FOR SYMPTOMS OF IRREGULAR BLOOD GLUCOSE (Patient not taking: Reported on 5/20/2022) 300 each 3    blood glucose monitor kit and supplies Test 3 times a day & as needed for symptoms of irregular blood glucose. Diabetes Mellitus E11.9 1 kit 0    blood glucose monitor strips Test three times daily and prn 100 strip 5     No current facility-administered medications for this visit. Review of Systems    Vitals:    09/09/22 1513   BP: (!) 140/82   Pulse: (!) 102   SpO2: 97%   Weight: 254 lb 3.2 oz (115.3 kg)   Height: 5' 1\" (1.549 m)      Physical Exam    Assessment/Plan:  1. Type 2 diabetes mellitus without complication, with long-term current use of insulin ([facility])  ***    2. Essential hypertension  ***    3. Mild intermittent asthma without complication  ***    Discussed medications with patient, who voiced understanding of their use and indications. All questions answered. No follow-ups on file.       Electronically signed by Darryle Parents, APRN - CNP on 9/9/2022 at 4:16 PM

## 2022-09-09 NOTE — PROGRESS NOTES
Syringe 1    hydrOXYzine HCl (ATARAX) 25 MG tablet Take 1 tablet by mouth every 8 hours as needed for Anxiety 30 tablet 0    lurasidone (LATUDA) 80 MG TABS tablet Take 1 tablet by mouth Daily with supper 30 tablet 0    insulin glargine (LANTUS SOLOSTAR) 100 UNIT/ML injection pen Inject 40 Units into the skin 2 times daily 72 mL 0    gabapentin (NEURONTIN) 300 MG capsule TAKE 1 CAPSULE BY MOUTH AT BEDTIME      Lancets MISC 1 each by Does not apply route daily 100 each 5    empagliflozin (JARDIANCE) 25 MG tablet Take 1 tablet by mouth daily 30 tablet 3    albuterol sulfate HFA (PROVENTIL;VENTOLIN;PROAIR) 108 (90 Base) MCG/ACT inhaler Inhale 2 puffs into the lungs every 6 hours as needed for Wheezing or Shortness of Breath 18 g 3    ondansetron (ZOFRAN ODT) 4 MG disintegrating tablet Take 1 tablet by mouth every 8 hours as needed for Nausea 20 tablet 0    SITagliptin (JANUVIA) 100 MG tablet Take 1 tablet by mouth daily 30 tablet 0    lisinopril-hydroCHLOROthiazide (PRINZIDE;ZESTORETIC) 20-12.5 MG per tablet Take 1 tablet by mouth daily 90 tablet 3    simvastatin (ZOCOR) 5 MG tablet Take 1 tablet by mouth nightly 90 tablet 3    pantoprazole (PROTONIX) 40 MG tablet Take 1 tablet by mouth every morning (before breakfast) 30 tablet 5    Rimegepant Sulfate (NURTEC) 75 MG TBDP Take 75 mg by mouth as needed       Insulin Pen Needle 31G X 5 MM MISC 1 each by Does not apply route daily 100 each 3    glucose monitoring (FREESTYLE FREEDOM) kit 1 kit by Does not apply route daily 1 kit 0    blood glucose monitor strips Test 2 times a day & as needed for symptoms of irregular blood glucose. Dispense sufficient amount for indicated testing frequency plus additional to accommodate PRN testing needs. 100 strip 3    blood glucose monitor kit and supplies Dispense sufficient amount for indicated testing frequency plus additional to accommodate PRN testing needs.  Dispense all needed supplies to include: monitor, strips, lancing device, lancets, control solutions, alcohol swabs. 1 kit 0    [employer] Lancets Thin 26G MISC TEST 3 TIMES A DAY AND AS NEEDED FOR SYMPTOMS OF IRREGULAR BLOOD GLUCOSE (Patient not taking: Reported on 5/20/2022) 300 each 3    blood glucose monitor kit and supplies Test 3 times a day & as needed for symptoms of irregular blood glucose. Diabetes Mellitus E11.9 1 kit 0    blood glucose monitor strips Test three times daily and prn 100 strip 5     No current facility-administered medications for this visit. Review of Systems  Negative other than HPI    Vitals:    09/09/22 1513   BP: (!) 140/82   Pulse: (!) 102   SpO2: 97%   Weight: 254 lb 3.2 oz (115.3 kg)   Height: 5' 1\" (1.549 m)      Physical Exam  Constitutional:       General: She is not in acute distress. Appearance: Normal appearance. She is obese. She is not ill-appearing. HENT:      Head: Normocephalic and atraumatic. Pulmonary:      Effort: Pulmonary effort is normal. No respiratory distress. Neurological:      Mental Status: She is alert and oriented to person, place, and time. Mental status is at baseline. Psychiatric:         Mood and Affect: Mood normal.         Behavior: Behavior normal.     Assessment/Plan:  Type 2 diabetes mellitus without complication, with long-term current use of insulin ([facility])  Chronic, uncontrolled  A1c 11.2 x2 in the past few weeks   Denies any change in her fasting glucose since restarting lantus   Continue lantus 40 units BID, januvia 100 mg daily and jardiance 25 mg daily   Adding in meal time insulin   Encouraged pt to bring glucose log to appt in 2 weeks with PCP  Discussed possible CGM   - insulin lispro, 1 Unit Dial, (HUMALOG KWIKPEN) 100 UNIT/ML SOPN; Inject 5 Units into the skin 3 times daily (before meals) Hold if glucose less than 110  Dispense: 5 Adjustable Dose Pre-filled Pen Syringe;  Refill: 1    Essential hypertension  Chronic, moderately controlled  Continue current medications     Mild intermittent asthma without complication  Chronic, stable, controlled. Anxiety/ Mild intellectual disability/ Bipolar affective disorder, current episode depressed, current episode severity unspecified ([facility] [address] Severe episode of recurrent major depressive disorder, without psychotic features ([facility] [address]  Chronic, moderately controlled  Improved from recent admission   Prn hydroxyzine for anxiety   Keep appt with [facility]   - hydrOXYzine HCl (ATARAX) 25 MG tablet; Take 1 tablet by mouth every 8 hours as needed for Anxiety  Dispense: 30 tablet; Refill: 0    Discussed medications with patient, who voiced understanding of their use and indications. All questions answered.     Keep follow up appt as scheduled with PCP     Electronically signed by TIARA Ayala CNP on 9/9/2022 at 4:59 PM

## 2022-09-14 ENCOUNTER — OFFICE VISIT (OUTPATIENT)
Dept: ORTHOPEDIC SURGERY | Age: 42
End: 2022-09-14
Payer: MEDICARE

## 2022-09-14 VITALS — BODY MASS INDEX: 47.77 KG/M2 | HEIGHT: 61 IN | WEIGHT: 253 LBS

## 2022-09-14 DIAGNOSIS — G56.01 CARPAL TUNNEL SYNDROME OF RIGHT WRIST: ICD-10-CM

## 2022-09-14 DIAGNOSIS — M25.531 RIGHT WRIST PAIN: Primary | ICD-10-CM

## 2022-09-14 PROCEDURE — G8427 DOCREV CUR MEDS BY ELIG CLIN: HCPCS | Performed by: ORTHOPAEDIC SURGERY

## 2022-09-14 PROCEDURE — 99203 OFFICE O/P NEW LOW 30 MIN: CPT | Performed by: ORTHOPAEDIC SURGERY

## 2022-09-14 PROCEDURE — G8417 CALC BMI ABV UP PARAM F/U: HCPCS | Performed by: ORTHOPAEDIC SURGERY

## 2022-09-14 PROCEDURE — L3908 WHO COCK-UP NONMOLDE PRE OTS: HCPCS | Performed by: ORTHOPAEDIC SURGERY

## 2022-09-14 PROCEDURE — 1111F DSCHRG MED/CURRENT MED MERGE: CPT | Performed by: ORTHOPAEDIC SURGERY

## 2022-09-14 PROCEDURE — 29280 STRAPPING OF HAND OR FINGER: CPT | Performed by: ORTHOPAEDIC SURGERY

## 2022-09-14 PROCEDURE — 1036F TOBACCO NON-USER: CPT | Performed by: ORTHOPAEDIC SURGERY
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
## 2022-09-22 ENCOUNTER — TELEMEDICINE (OUTPATIENT)
Dept: INTERNAL MEDICINE CLINIC | Age: 42
End: 2022-09-22
Payer: MEDICARE

## 2022-09-22 DIAGNOSIS — G43.009 MIGRAINE WITHOUT AURA AND WITHOUT STATUS MIGRAINOSUS, NOT INTRACTABLE: ICD-10-CM

## 2022-09-22 DIAGNOSIS — R22.0 HEAD LUMP: Primary | ICD-10-CM

## 2022-09-22 PROCEDURE — 99212 OFFICE O/P EST SF 10 MIN: CPT | Performed by: NURSE PRACTITIONER

## 2022-09-22 PROCEDURE — 1111F DSCHRG MED/CURRENT MED MERGE: CPT | Performed by: NURSE PRACTITIONER

## 2022-09-22 PROCEDURE — G8417 CALC BMI ABV UP PARAM F/U: HCPCS | Performed by: NURSE PRACTITIONER

## 2022-09-22 PROCEDURE — G8427 DOCREV CUR MEDS BY ELIG CLIN: HCPCS | Performed by: NURSE PRACTITIONER

## 2022-09-22 PROCEDURE — 1036F TOBACCO NON-USER: CPT | Performed by: NURSE PRACTITIONER

## 2022-09-22 RX ORDER — MECLIZINE HYDROCHLORIDE 25 MG/1
25 TABLET ORAL PRN
COMMUNITY
Start: 2022-09-12

## 2022-09-22 RX ORDER — CYCLOBENZAPRINE HCL 10 MG
10 TABLET ORAL PRN
COMMUNITY
Start: 2022-09-14

## 2022-09-22 RX ORDER — HYDROXYZINE 50 MG/1
50 TABLET, FILM COATED ORAL NIGHTLY
COMMUNITY
Start: 2022-09-01

## 2022-09-22 RX ORDER — MELOXICAM 15 MG/1
15 TABLET ORAL DAILY
COMMUNITY
Start: 2022-09-15

## 2022-09-22 RX ORDER — TIZANIDINE 4 MG/1
4 TABLET ORAL NIGHTLY
COMMUNITY
Start: 2022-09-15

## 2022-09-22 NOTE — PROGRESS NOTES
2022    TELEHEALTH EVALUATION -- Audio/Visual (During [id number] public health emergency)    HPI:    Raul Chery (:  1980) has requested an audio/video evaluation for the following concern(s):    CC: Knot on back of head, migraine    HPI: She was in the emergency department 1 week ago for migraine. Knot on the back of her head for 3-4 days. No known injury or trauma. Area is tender to touch. No drainage. No fevers, chills. She has chronic migraines which are normal for her. Last night migraine was worse than normal.  She had dizziness last night. This is better at present. She remains under the care of neurology. Review of Systems   Constitutional:  Negative for chills and fever. Skin:         Painful lump on the back of her head   Neurological:  Positive for headaches. Prior to Visit Medications    Medication Sig Taking?  Authorizing Provider   cyclobenzaprine (FLEXERIL) 10 MG tablet Take 10 mg by mouth as needed Yes Historical Provider, MD   hydrOXYzine HCl (ATARAX) 50 MG tablet Take 50 mg by mouth at bedtime Yes Historical Provider, MD   meclizine (ANTIVERT) 25 MG tablet Take 25 mg by mouth as needed Yes Historical Provider, MD   meloxicam (MOBIC) 15 MG tablet Take 15 mg by mouth Daily Yes Historical Provider, MD   tiZANidine (ZANAFLEX) 4 MG tablet Take 4 mg by mouth at bedtime Yes Historical Provider, MD   escitalopram (LEXAPRO) 5 MG tablet TAKE 1 TABLET BY MOUTH EVERY MORNING Yes Historical Provider, MD   insulin lispro, 1 Unit Dial, (HUMALOG KWIKPEN) 100 UNIT/ML SOPN Inject 5 Units into the skin 3 times daily (before meals) Hold if glucose less than 110 Yes Lyly Canales, APRN - CNP   lurasidone (LATUDA) 80 MG TABS tablet Take 1 tablet by mouth Daily with supper Yes Lilly Barr MD   insulin glargine (LANTUS SOLOSTAR) 100 UNIT/ML injection pen Inject 40 Units into the skin 2 times daily Yes Lilly Barr MD   gabapentin (NEURONTIN) 300 MG capsule TAKE 1 CAPSULE BY MOUTH AT BEDTIME Yes Historical Maxime, MD   Insulin Pen Needle 31G X 5 MM MISC 1 each by Does not apply route daily Yes Syed Jesus MD   glucose monitoring (FREESTYLE FREEDOM) kit 1 kit by Does not apply route daily Yes Syed Jesus MD   blood glucose monitor strips Test 2 times a day & as needed for symptoms of irregular blood glucose. Dispense sufficient amount for indicated testing frequency plus additional to accommodate PRN testing needs. Yes Syed Jesus MD   Lancets MISC 1 each by Does not apply route daily Yes Syed Jesus MD   empagliflozin (JARDIANCE) 25 MG tablet Take 1 tablet by mouth daily Yes Syed Jesus MD   albuterol sulfate HFA (PROVENTIL;VENTOLIN;PROAIR) 108 (90 Base) MCG/ACT inhaler Inhale 2 puffs into the lungs every 6 hours as needed for Wheezing or Shortness of Breath Yes Syed Jesus MD   blood glucose monitor kit and supplies Dispense sufficient amount for indicated testing frequency plus additional to accommodate PRN testing needs. Dispense all needed supplies to include: monitor, strips, lancing device, lancets, control solutions, alcohol swabs. Yes TIARA Kirby CNP   ondansetron (ZOFRAN ODT) 4 MG disintegrating tablet Take 1 tablet by mouth every 8 hours as needed for Nausea Yes TIARA Kirby CNP   SITagliptin (JANUVIA) 100 MG tablet Take 1 tablet by mouth daily Yes TIARA Kirby CNP   lisinopril-hydroCHLOROthiazide (PRINZIDE;ZESTORETIC) 20-12.5 MG per tablet Take 1 tablet by mouth daily Yes Sami Newberry MD   simvastatin (ZOCOR) 5 MG tablet Take 1 tablet by mouth nightly Yes Sami Newberry MD   [employer] Lancets Thin 26G MISC TEST 3 TIMES A DAY AND AS NEEDED FOR SYMPTOMS OF IRREGULAR BLOOD GLUCOSE Yes Sami Newberry MD   blood glucose monitor kit and supplies Test 3 times a day & as needed for symptoms of irregular blood glucose.   Diabetes Mellitus E11.9 Yes Sami Newberry MD   blood glucose monitor strips Test three times daily and prn Yes Wilder Enriquez MD   pantoprazole (PROTONIX) 40 MG tablet Take 1 tablet by mouth every morning (before breakfast) Yes Golden Martinez MD   Rimegepant Sulfate (NURTEC) 75 MG TBDP Take 75 mg by mouth as needed  Yes Historical Provider, MD   ibuprofen (ADVIL;MOTRIN) 600 MG tablet Take 1 tablet by mouth every 8 hours as needed for Pain  Chandni De Luna, APRN - CNP       Social History     Tobacco Use    Smoking status: Never    Smokeless tobacco: Never    Tobacco comments:     around 2nd hand smoke    Vaping Use    Vaping Use: Never used    Passive vaping exposure: Yes   Substance Use Topics    Alcohol use: No    Drug use: Never            PHYSICAL EXAMINATION:  [ INSTRUCTIONS:  \"[x]\" Indicates a positive item  \"[]\" Indicates a negative item  -- DELETE ALL ITEMS NOT EXAMINED]  Vital Signs: (As obtained by patient/caregiver or practitioner observation)    Blood pressure-  Heart rate-    Respiratory rate-    Temperature-  Pulse oximetry-     Constitutional: [x] Appears well-developed and well-nourished [x] No apparent distress      [] Abnormal-   Mental status  [x] Alert and awake  [x] Oriented to person/place/time [x]Able to follow commands      Eyes:  EOM    [x]  Normal  [] Abnormal-  Sclera  [x]  Normal  [] Abnormal -         Discharge [x]  None visible  [] Abnormal -    HENT:   [x] Normocephalic, atraumatic.   [] Abnormal   [x] Mouth/Throat: Mucous membranes are moist.     External Ears [x] Normal  [] Abnormal-     Neck: [x] No visualized mass     Pulmonary/Chest: [x] Respiratory effort normal.  [x] No visualized signs of difficulty breathing or respiratory distress        [] Abnormal-      Musculoskeletal:   [x] Normal gait with no signs of ataxia         [x] Normal range of motion of neck        [] Abnormal-       Neurological:        [x] No Facial Asymmetry (Cranial nerve 7 motor function) (limited exam to video visit)          [x] No gaze palsy        [] Abnormal-         Skin:        [x] No significant exanthematous

## 2022-09-25 PROBLEM — Z00.00 PREVENTATIVE HEALTH CARE: Status: RESOLVED | Noted: 2022-08-26 | Resolved: 2022-09-25

## 2022-10-05 PROBLEM — Z00.00 ENCOUNTER FOR ROUTINE ADULT MEDICAL EXAMINATION: Status: RESOLVED | Noted: 2022-09-04 | Resolved: 2022-10-05
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
## 2022-11-09 ENCOUNTER — NURSE TRIAGE (OUTPATIENT)
Dept: OTHER | Facility: CLINIC | Age: 42
End: 2022-11-09

## 2022-11-09 NOTE — TELEPHONE ENCOUNTER
Location of patient: Vish Yuen call from [facility] at [facility] with [employer]. Patient is calling to establish care with a PCP. Current Symptoms: States blood sugar today is 392, increased thirst, increased urination, shortness of breath, wheeze    States is taking an antibiotic for bronchitis      States has been taking medication as directed      Denies - vomiting     Onset: \"the past couple of months\"      Pain Severity: 6/10; chest pain with coughing    Temperature: \"I feel warm but I don't have a thermometer\"    What has been tried: ibuprofen        Recommended disposition: See in Office Today  Patient does not have a PCP and was advised to go to an [address] [place], [place] [address] Ave for current problem. Care advice provided, patient verbalizes understanding; denies any other questions or concerns; instructed to call back for any new or worsening symptoms. Patient/Caller agrees with recommended disposition; writer provided warm transfer to Karla Crowder at [facility] for appointment scheduling  For new patient appointment scheduling. Attention Provider: Thank you for allowing me to participate in the care of your patient. The patient was connected to triage in response to information provided to the ECC/PSC. Please do not respond through this encounter as the response is not directed to a shared pool.         Reason for Disposition   Symptoms of high blood sugar (e.g., frequent urination, weak, weight loss) and not able to test blood glucose    Protocols used: Diabetes - High Blood Sugar-ADULT-OH

## 2022-11-10 ENCOUNTER — NURSE TRIAGE (OUTPATIENT)
Dept: OTHER | Facility: CLINIC | Age: 42
End: 2022-11-10

## 2022-11-10 ENCOUNTER — OFFICE VISIT (OUTPATIENT)
Dept: PRIMARY CARE CLINIC | Age: 42
End: 2022-11-10
Payer: MEDICARE

## 2022-11-10 ENCOUNTER — TELEPHONE (OUTPATIENT)
Dept: PRIMARY CARE CLINIC | Age: 42
End: 2022-11-10

## 2022-11-10 VITALS
HEART RATE: 108 BPM | HEIGHT: 61 IN | DIASTOLIC BLOOD PRESSURE: 84 MMHG | BODY MASS INDEX: 47.05 KG/M2 | WEIGHT: 249.2 LBS | OXYGEN SATURATION: 98 % | SYSTOLIC BLOOD PRESSURE: 128 MMHG | TEMPERATURE: 99.4 F

## 2022-11-10 DIAGNOSIS — F31.9 BIPOLAR 1 DISORDER, DEPRESSED (HCC): ICD-10-CM

## 2022-11-10 DIAGNOSIS — F70 MILD INTELLECTUAL DISABILITY: ICD-10-CM

## 2022-11-10 DIAGNOSIS — F41.1 GAD (GENERALIZED ANXIETY DISORDER): ICD-10-CM

## 2022-11-10 DIAGNOSIS — F33.2 SEVERE EPISODE OF RECURRENT MAJOR DEPRESSIVE DISORDER, WITHOUT PSYCHOTIC FEATURES (HCC): ICD-10-CM

## 2022-11-10 DIAGNOSIS — E11.65 UNCONTROLLED TYPE 2 DIABETES MELLITUS WITH HYPERGLYCEMIA (HCC): Primary | ICD-10-CM

## 2022-11-10 DIAGNOSIS — G43.009 MIGRAINE WITHOUT AURA AND WITHOUT STATUS MIGRAINOSUS, NOT INTRACTABLE: ICD-10-CM

## 2022-11-10 DIAGNOSIS — I10 ESSENTIAL HYPERTENSION, BENIGN: ICD-10-CM

## 2022-11-10 DIAGNOSIS — E11.9 DIABETES MELLITUS, TYPE II, INSULIN DEPENDENT (HCC): ICD-10-CM

## 2022-11-10 DIAGNOSIS — Z79.4 DIABETES MELLITUS, TYPE II, INSULIN DEPENDENT (HCC): ICD-10-CM

## 2022-11-10 DIAGNOSIS — J01.90 ACUTE SINUSITIS, RECURRENCE NOT SPECIFIED, UNSPECIFIED LOCATION: ICD-10-CM

## 2022-11-10 PROBLEM — S83.241A ACUTE MEDIAL MENISCUS TEAR OF RIGHT KNEE: Status: ACTIVE | Noted: 2022-03-21

## 2022-11-10 LAB
CHP ED QC CHECK: NORMAL
GLUCOSE BLD-MCNC: 266 MG/DL

## 2022-11-10 PROCEDURE — 82962 GLUCOSE BLOOD TEST: CPT | Performed by: FAMILY MEDICINE

## 2022-11-10 PROCEDURE — G8427 DOCREV CUR MEDS BY ELIG CLIN: HCPCS | Performed by: FAMILY MEDICINE

## 2022-11-10 PROCEDURE — G8482 FLU IMMUNIZE ORDER/ADMIN: HCPCS | Performed by: FAMILY MEDICINE

## 2022-11-10 PROCEDURE — 1036F TOBACCO NON-USER: CPT | Performed by: FAMILY MEDICINE

## 2022-11-10 PROCEDURE — 2022F DILAT RTA XM EVC RTNOPTHY: CPT | Performed by: FAMILY MEDICINE

## 2022-11-10 PROCEDURE — 3074F SYST BP LT 130 MM HG: CPT | Performed by: FAMILY MEDICINE

## 2022-11-10 PROCEDURE — G8417 CALC BMI ABV UP PARAM F/U: HCPCS | Performed by: FAMILY MEDICINE

## 2022-11-10 PROCEDURE — 3046F HEMOGLOBIN A1C LEVEL >9.0%: CPT | Performed by: FAMILY MEDICINE

## 2022-11-10 PROCEDURE — 99214 OFFICE O/P EST MOD 30 MIN: CPT | Performed by: FAMILY MEDICINE

## 2022-11-10 PROCEDURE — 3078F DIAST BP <80 MM HG: CPT | Performed by: FAMILY MEDICINE

## 2022-11-10 RX ORDER — AMOXICILLIN AND CLAVULANATE POTASSIUM 875; 125 MG/1; MG/1
TABLET, FILM COATED ORAL
COMMUNITY
End: 2022-11-10 | Stop reason: SDUPTHER

## 2022-11-10 RX ORDER — INSULIN GLARGINE 100 [IU]/ML
45 INJECTION, SOLUTION SUBCUTANEOUS 2 TIMES DAILY
Qty: 72 ML | Refills: 2
Start: 2022-11-10 | End: 2022-11-21 | Stop reason: SDUPTHER

## 2022-11-10 RX ORDER — LISINOPRIL AND HYDROCHLOROTHIAZIDE 20; 12.5 MG/1; MG/1
1 TABLET ORAL DAILY
Qty: 90 TABLET | Refills: 1 | Status: SHIPPED | OUTPATIENT
Start: 2022-11-10

## 2022-11-10 RX ORDER — AMOXICILLIN AND CLAVULANATE POTASSIUM 875; 125 MG/1; MG/1
TABLET, FILM COATED ORAL
Qty: 14 TABLET | Refills: 0 | Status: SHIPPED | OUTPATIENT
Start: 2022-11-10 | End: 2022-11-21

## 2022-11-10 RX ORDER — INSULIN LISPRO 100 [IU]/ML
8 INJECTION, SOLUTION INTRAVENOUS; SUBCUTANEOUS
Qty: 5 ADJUSTABLE DOSE PRE-FILLED PEN SYRINGE | Refills: 1
Start: 2022-11-10 | End: 2022-11-21 | Stop reason: SDUPTHER

## 2022-11-10 ASSESSMENT — ENCOUNTER SYMPTOMS
EYE ITCHING: 0
VOMITING: 1
SORE THROAT: 0
TROUBLE SWALLOWING: 0
NAUSEA: 0
SINUS PAIN: 1
COUGH: 1
SHORTNESS OF BREATH: 0
EYE DISCHARGE: 0
ABDOMINAL PAIN: 0
SINUS PRESSURE: 1
DIARRHEA: 0

## 2022-11-10 NOTE — PROGRESS NOTES
Becky Bucio (:  1980) is a 43 y.o. female,Established patient, here for evaluation of the following chief complaint(s):  Diabetes, Otalgia, Cough, Congestion (X 1 wk), and Wheezing      ASSESSMENT/PLAN:  1. Uncontrolled type 2 diabetes mellitus with hyperglycemia [facility])  Assessment & Plan:    A1c 11.2 in 2022, current medications include Lantus, Humalog, Jardiance and Januvia. Blood sugars running high at home and in the clinic today. Changes made as above. Referral to endocrinology, patient agreeable. Interested in insulin pump and/or Dexcom  Orders:  -     POCT Glucose  -     insulin lispro, 1 Unit Dial, (HUMALOG KWIKPEN) 100 UNIT/ML SOPN; Inject 8 Units into the skin 3 times daily (before meals) Hold if glucose less than 110, Disp-5 Adjustable Dose Pre-filled Pen Syringe, R-1NO PRINT  -     SITagliptin (JANUVIA) 100 MG tablet; Take 1 tablet by mouth daily, Disp-90 tablet, R-1Normal  -     Tanvir Figueredo MD, Endocrinology, [facility]  2. Acute sinusitis, recurrence not specified, unspecified location  -     amoxicillin-clavulanate (AUGMENTIN) 875-125 MG per tablet; amoxicillin 875 mg-potassium clavulanate 125 mg tablet  Take 1 tablet every 12 hours by oral route for 7 days. , Disp-14 tablet, R-0Normal  3. Diabetes mellitus, type II, insulin dependent (HCC)  -     insulin glargine (LANTUS SOLOSTAR) 100 UNIT/ML injection pen; Inject 45 Units into the skin 2 times daily, Disp-72 mL, R-2NO PRINT  4. Essential hypertension, benign  -     lisinopril-hydroCHLOROthiazide (PRINZIDE;ZESTORETIC) 20-12.5 MG per tablet; Take 1 tablet by mouth daily, Disp-90 tablet, R-1Needs an appointment for further refills. Normal  5. Mild intellectual disability  6. Bipolar 1 disorder, depressed ([facility] [address]  Assessment & Plan:    follows with behavioral health at [facility], usually 1-2 times a. Manages all medications including Latuda and Lexapro  7.  CATHERINE (generalized anxiety disorder)  Assessment & Plan:    follows with behavioral health  8. Severe episode of recurrent major depressive disorder, without psychotic features ([address]  Assessment & Plan:    follows with behavioral health, Lexapro  9. Migraine without aura and without status migrainosus, not intractable  Assessment & Plan:    follows with neurology at [facility], currently on Nurtec and gabapentin    As per chart dispatch health had sent amoxicillin/cabinet to patient's pharmacy however patient unaware of this. Did send in a new prescription just in case. Return in about 2 weeks (around 11/24/2022), or if symptoms worsen or fail to improve, for DM2. SUBJECTIVE/OBJECTIVE:  HPI    DM Type 2 - uncontrolled   - BG have been running high  - last A1c 9/2022 11.2  - Today , this morning around 266  - checks 3 times a day, yesterday 319, 418  +vomiting usually once a day, usually at night time and sometimes in the morning  - denies any abdominal pain   - never followed with Endo  -Also on ACE inhibitor and statin    Jardiance  Januvia  Lantus 40 units BID changed to 45 units twice daily  Humalog 5 units TID changed to 8 units 3 times daily with meals, hold for blood sugars less than 110      HTN  - on lisinopril/hctz  -Blood pressure well controlled    Congestion and cough  - was on antibiotics   - was on doxyxycline but was stopped as she called [facility] who came to her house yesterday - was told to stop doxy but did not tell her lico they will send in a new one       BP  CATHERINE  Depression  PTSD  - follows [facility] 1-2 times month  - also has counseling   - all medications are managed by them    Migraines  - [facility] - Neurology  - on Gabapentin      Review of Systems   Constitutional:  Positive for fever. Negative for appetite change, chills and fatigue. HENT:  Positive for congestion, sinus pressure and sinus pain. Negative for ear pain, sneezing, sore throat and trouble swallowing.     Eyes: Negative for discharge and itching. Respiratory:  Positive for cough. Negative for shortness of breath. Cardiovascular:  Negative for chest pain and leg swelling. Gastrointestinal:  Positive for vomiting. Negative for abdominal pain, diarrhea and nausea. Genitourinary:  Negative for dysuria and urgency. Musculoskeletal:  Negative for joint swelling and neck pain. Neurological:  Negative for light-headedness and headaches. Psychiatric/Behavioral:  Negative for dysphoric mood. The patient is not nervous/anxious. Physical Exam  Constitutional:       General: She is not in acute distress. Appearance: Normal appearance. She is obese. HENT:      Head: Normocephalic and atraumatic. Right Ear: Tympanic membrane and external ear normal.      Left Ear: Tympanic membrane and external ear normal.      Nose: Congestion present. No rhinorrhea. Eyes:      General:         Right eye: No discharge. Left eye: No discharge. Extraocular Movements: Extraocular movements intact. Conjunctiva/sclera: Conjunctivae normal.   Cardiovascular:      Rate and Rhythm: Normal rate and regular rhythm. Heart sounds: Normal heart sounds. No murmur heard. Pulmonary:      Effort: Pulmonary effort is normal.      Breath sounds: Normal breath sounds. No wheezing. Abdominal:      General: Bowel sounds are normal. There is no distension. Palpations: Abdomen is soft. Tenderness: There is no abdominal tenderness. Musculoskeletal:      Cervical back: Normal range of motion and neck supple. Skin:     General: Skin is warm and dry. Neurological:      General: No focal deficit present. Mental Status: She is alert and oriented to person, place, and time. Psychiatric:         Mood and Affect: Mood normal.         Behavior: Behavior normal.         An electronic signature was used to authenticate this note.     --Que Cedeno MD

## 2022-11-10 NOTE — TELEPHONE ENCOUNTER
Location of patient: [facility] Alexandra Yuen call from Darshana at [employer] with [employer]. Subjective: Caller states \"blood sugar over 400\"     Current Symptoms: just got home from doctor where blood sugar 200. Vomited at home and now blood sugar is 416. Nauseated, headache, pt feels shaky    Onset: 3 hours ago; sudden    Associated Symptoms: reduced activity, constipation    Pain Severity: 0/10; N/A; none    Temperature: 99.2 by forehead thermometer    What has been tried: rest,     LMP:  none since 2018  Pregnant: No    Recommended disposition: Go to ED Now    Care advice provided, patient verbalizes understanding; denies any other questions or concerns; instructed to call back for any new or worsening symptoms. Patient/caller agrees to proceed to nearest Emergency Department    Attention Provider: Thank you for allowing me to participate in the care of your patient. The patient was connected to triage in response to information provided to the ECC/PSC. Please do not respond through this encounter as the response is not directed to a shared pool.     Reason for Disposition   Blood glucose > 240 mg/dL (13.3 mmol/L) AND vomiting AND unable to check for ketones (in blood or urine)    Protocols used: Diabetes - High Blood Sugar-ADULT-[place]

## 2022-11-10 NOTE — ASSESSMENT & PLAN NOTE
A1c 11.2 in September 2022, current medications include Lantus, Humalog, Jardiance and Januvia. Blood sugars running high at home and in the clinic today. Changes made as above. Referral to endocrinology, patient agreeable.   Interested in insulin pump and/or Dexcom

## 2022-11-11 ENCOUNTER — TELEPHONE (OUTPATIENT)
Dept: INTERNAL MEDICINE CLINIC | Age: 42
End: 2022-11-11

## 2022-11-11 NOTE — TELEPHONE ENCOUNTER
----- Message from Diamond Austin sent at 11/11/2022  4:07 PM EST -----  Subject: Message to Provider    QUESTIONS  Information for Provider? Pt is having a weight loss surgery in February   or March of 2023 and needs a \"funmi\" sent to Dr. Sofia Catalan at   [facility] [place]. Fax is [phone]. Phone is [phone].  ---------------------------------------------------------------------------  --------------  Anastacia Thomason [facility]  [phone]; OK to leave message on voicemail  ---------------------------------------------------------------------------  --------------  SCRIPT ANSWERS  Relationship to Patient?  Self

## 2022-11-14 ENCOUNTER — TELEPHONE (OUTPATIENT)
Dept: PRIMARY CARE CLINIC | Age: 42
End: 2022-11-14

## 2022-11-14 NOTE — PROGRESS NOTES
Received a call at 3:30 AM on patient. She was noting she went to bathroom feels poorly and nauseated. She checked her blood sugar and found it was in the 500s. Stated she felt dizzy. Noted that she had been urinating a lot but less so now and that her mouth is very dry. She has a history of type two diabetes. Told me that she received a steroid shot for an asthma flareup recently as well. Because her blood sugar was so high and she sounded dehydrated I sent her to the emergency room.

## 2022-11-14 NOTE — TELEPHONE ENCOUNTER
Noted, however patient just established care with me this month and A1c is uncontrolled. She was referred to endocrinology, I am not sure if she has made an appointment with them or not. If not she was supposed to follow-up with me in 2 weeks to make sure that her blood sugars are stable. If she already has an appointment with endocrinology, she can see me in February as scheduled however this will need to be a preop clearance depending on when her surgery is scheduled for, usually within 30 days.     Let me know if any questions or concerns  Dr. Terry Ga

## 2022-11-14 NOTE — TELEPHONE ENCOUNTER
Noted, patient had similar high blood glucose levels last week and was advised to go to the ER. Unsure if she went to the ER at that time or not. Please call patient on 11/15 morning to see if she is doing any better or if she went to the ER or not.

## 2022-11-15 NOTE — TELEPHONE ENCOUNTER
Pt did go to ER at [facility] gave her fluids and did blood work. BG this AM was 539. Her BG right now is 330.   Pt is seeing Endo on 12/13/22  Dr. Diana Gutierrez

## 2022-11-16 NOTE — TELEPHONE ENCOUNTER
Noted, looks like her blood sugars are still high. Please advise patient to check her blood sugars at least 3 times a day, write them down on a piece of paper aim is to get sugars below at least 250 if not less than 200. Her appointment with [facility] is pretty far I would like to see her in the next week if possible, with a log of her blood sugars. Please advise patient to bring all her medications with her to this appointment.   Dr. Garcia Rosa

## 2022-11-21 ENCOUNTER — OFFICE VISIT (OUTPATIENT)
Dept: PRIMARY CARE CLINIC | Age: 42
End: 2022-11-21
Payer: MEDICARE

## 2022-11-21 VITALS
DIASTOLIC BLOOD PRESSURE: 80 MMHG | SYSTOLIC BLOOD PRESSURE: 110 MMHG | OXYGEN SATURATION: 98 % | BODY MASS INDEX: 46.26 KG/M2 | WEIGHT: 245 LBS | HEIGHT: 61 IN | HEART RATE: 104 BPM

## 2022-11-21 DIAGNOSIS — E11.65 UNCONTROLLED TYPE 2 DIABETES MELLITUS WITH HYPERGLYCEMIA (HCC): Primary | ICD-10-CM

## 2022-11-21 DIAGNOSIS — H65.93 FLUID LEVEL BEHIND TYMPANIC MEMBRANE OF BOTH EARS: ICD-10-CM

## 2022-11-21 DIAGNOSIS — N89.8 VAGINAL ITCHING: ICD-10-CM

## 2022-11-21 DIAGNOSIS — R68.2 DRY MOUTH: ICD-10-CM

## 2022-11-21 PROCEDURE — 3074F SYST BP LT 130 MM HG: CPT | Performed by: FAMILY MEDICINE

## 2022-11-21 PROCEDURE — G8482 FLU IMMUNIZE ORDER/ADMIN: HCPCS | Performed by: FAMILY MEDICINE

## 2022-11-21 PROCEDURE — 99214 OFFICE O/P EST MOD 30 MIN: CPT | Performed by: FAMILY MEDICINE

## 2022-11-21 PROCEDURE — 3046F HEMOGLOBIN A1C LEVEL >9.0%: CPT | Performed by: FAMILY MEDICINE

## 2022-11-21 PROCEDURE — 2022F DILAT RTA XM EVC RTNOPTHY: CPT | Performed by: FAMILY MEDICINE

## 2022-11-21 PROCEDURE — G8427 DOCREV CUR MEDS BY ELIG CLIN: HCPCS | Performed by: FAMILY MEDICINE

## 2022-11-21 PROCEDURE — 3078F DIAST BP <80 MM HG: CPT | Performed by: FAMILY MEDICINE

## 2022-11-21 PROCEDURE — G8417 CALC BMI ABV UP PARAM F/U: HCPCS | Performed by: FAMILY MEDICINE

## 2022-11-21 PROCEDURE — 1036F TOBACCO NON-USER: CPT | Performed by: FAMILY MEDICINE

## 2022-11-21 RX ORDER — FLUTICASONE PROPIONATE 50 MCG
SPRAY, SUSPENSION (ML) NASAL
Qty: 48 G | OUTPATIENT
Start: 2022-11-21

## 2022-11-21 RX ORDER — INSULIN GLARGINE 100 [IU]/ML
INJECTION, SOLUTION SUBCUTANEOUS
Qty: 72 ML | Refills: 2 | Status: SHIPPED | OUTPATIENT
Start: 2022-11-21

## 2022-11-21 RX ORDER — FLUTICASONE PROPIONATE 50 MCG
SPRAY, SUSPENSION (ML) NASAL
Qty: 16 G | Refills: 1 | Status: SHIPPED | OUTPATIENT
Start: 2022-11-21

## 2022-11-21 RX ORDER — FEXOFENADINE HCL 180 MG/1
TABLET ORAL
Qty: 30 TABLET | Refills: 0 | Status: SHIPPED | OUTPATIENT
Start: 2022-11-21

## 2022-11-21 RX ORDER — IBUPROFEN 200 MG
200 TABLET ORAL EVERY 6 HOURS PRN
COMMUNITY

## 2022-11-21 RX ORDER — INSULIN LISPRO 100 [IU]/ML
13 INJECTION, SOLUTION INTRAVENOUS; SUBCUTANEOUS
Qty: 5 ADJUSTABLE DOSE PRE-FILLED PEN SYRINGE | Refills: 2 | Status: SHIPPED | OUTPATIENT
Start: 2022-11-21

## 2022-11-21 RX ORDER — FLUCONAZOLE 150 MG/1
TABLET ORAL
Qty: 2 TABLET | Refills: 0 | Status: SHIPPED | OUTPATIENT
Start: 2022-11-21

## 2022-11-21 RX ORDER — MIRTAZAPINE 15 MG/1
7.5 TABLET, FILM COATED ORAL NIGHTLY
COMMUNITY

## 2022-11-21 ASSESSMENT — ENCOUNTER SYMPTOMS
VOMITING: 0
SHORTNESS OF BREATH: 0
EYE ITCHING: 0
TROUBLE SWALLOWING: 0
ABDOMINAL PAIN: 0
DIARRHEA: 0
SORE THROAT: 0
COUGH: 0
EYE DISCHARGE: 0
NAUSEA: 0

## 2022-11-21 NOTE — ASSESSMENT & PLAN NOTE
increase Lantus to 55 units in the morning and keep 45 units in the evening. Increase short acting insulin to 13 units with every meal.  Discussed in detail diet and compliance with medication which patient reports she is already doing. She has an appoint with endocrinology in December 13 however would like to see her in about 2 weeks. Advised to monitor sugars and call if they are still above 250 or if she has any of the symptoms she previously had.   She is agreeable to plan and demonstrates understanding

## 2022-11-21 NOTE — PROGRESS NOTES
Frank Herrera (:  1980) is a 43 y.o. female,Established patient, here for evaluation of the following chief complaint(s):  Follow-Up from Hospital (Elevate blood sugars ), Diabetes, and Blood Sugar Problem ([place] 22 was 297 and  495 after breakfast / 297 before breakfast and 400 after breakfast )      ASSESSMENT/PLAN:  1. Uncontrolled type 2 diabetes mellitus with hyperglycemia (HCC)  Assessment & Plan:    increase Lantus to 55 units in the morning and keep 45 units in the evening. Increase short acting insulin to 13 units with every meal.  Discussed in detail diet and compliance with medication which patient reports she is already doing. She has an appoint with endocrinology in  however would like to see her in about 2 weeks. Advised to monitor sugars and call if they are still above 250 or if she has any of the symptoms she previously had. She is agreeable to plan and demonstrates understanding  Orders:  -     insulin lispro, 1 Unit Dial, (HUMALOG KWIKPEN) 100 UNIT/ML SOPN; Inject 13 Units into the skin 3 times daily (before meals) Hold if glucose less than 110, Disp-5 Adjustable Dose Pre-filled Pen Syringe, R-2Normal  -     insulin glargine (LANTUS SOLOSTAR) 100 UNIT/ML injection pen; 55 units in the morning and 45 units in the evening, Disp-72 mL, R-2Normal  2. Vaginal itching  Comments:  Trial of Diflucan, with high sugars most likely yeast infection  Orders:  -     fluconazole (DIFLUCAN) 150 MG tablet; Take one now, if not better can repeat in 5-7 days, Disp-2 tablet, R-0Normal  3. Dry mouth  Comments:  Most likely due to uncontrolled sugar  4. Diabetes mellitus, type II, insulin dependent ([address]  5. Fluid level behind tympanic membrane of both ears  -     fluticasone (FLONASE) 50 MCG/ACT nasal spray; Use for 5 days then as needed, avoid using for more than 1 week, Disp-16 g, R-1Normal  -     fexofenadine (ALLEGRA) 180 MG tablet;  Take 1 tab at night for 1 week then as needed, Disp-30 tablet, R-0Normal    Return in about 2 weeks (around 12/5/2022), or if symptoms worsen or fail to improve, for DM 2 follow. SUBJECTIVE/OBJECTIVE:  HPI    DM type 2  Dry Mouth  - , 270, 399 before breakfast   - before dinner 396, 400, 495  - feels tired and \"dizzy\"  - Endo appt Dec  - last appt lantus and Humlaog - following that  - eating as recommended, drinking lots of water  - hard boiled eggs, tries to eat a salad - low fat dressing, only eats 1 piece of bread, sometimes eats chicken, hamburger  - went to the ER in [address] - reviewed Blood work    Vaginal Itching  - for 1-2 weeks  - might have caused bleeding, itching a lot       Review of Systems   Constitutional:  Positive for fatigue (With high sugar). Negative for appetite change, chills and fever. Dry mouth   HENT:  Negative for congestion, ear pain, sneezing, sore throat and trouble swallowing. Eyes:  Negative for discharge and itching. Respiratory:  Negative for cough and shortness of breath. Cardiovascular:  Negative for chest pain and leg swelling. Gastrointestinal:  Negative for abdominal pain, diarrhea, nausea and vomiting. Genitourinary:  Negative for dysuria and urgency. Vaginal itching   Musculoskeletal:  Negative for joint swelling and neck pain. Neurological:  Positive for dizziness (With high sugars). Negative for light-headedness and headaches. Psychiatric/Behavioral:  Negative for dysphoric mood. The patient is not nervous/anxious. Physical Exam  Constitutional:       General: She is not in acute distress. Appearance: She is obese. HENT:      Head: Normocephalic and atraumatic. Right Ear: External ear normal.      Left Ear: External ear normal.      Ears:      Comments: Middle ear effusion bilaterally     Nose: Nose normal. No congestion or rhinorrhea. Mouth/Throat:      Comments: Postnasal drip  Eyes:      General:         Right eye: No discharge.          Left eye: No discharge. Extraocular Movements: Extraocular movements intact. Conjunctiva/sclera: Conjunctivae normal.   Cardiovascular:      Rate and Rhythm: Normal rate and regular rhythm. Heart sounds: Normal heart sounds. No murmur heard. Pulmonary:      Effort: Pulmonary effort is normal.      Breath sounds: Normal breath sounds. No wheezing. Chest:      Chest wall: No tenderness. Musculoskeletal:         General: No swelling or tenderness. Normal range of motion. Cervical back: Normal range of motion and neck supple. Skin:     General: Skin is warm and dry. Neurological:      General: No focal deficit present. Mental Status: She is alert and oriented to person, place, and time. Psychiatric:         Mood and Affect: Mood normal.         Behavior: Behavior normal.         An electronic signature was used to authenticate this note.     --Yogesh Staples MD

## 2022-11-30 ENCOUNTER — OFFICE VISIT (OUTPATIENT)
Dept: PSYCHOLOGY | Age: 42
End: 2022-11-30

## 2022-11-30 DIAGNOSIS — F41.9 ANXIETY: ICD-10-CM

## 2022-11-30 DIAGNOSIS — F33.0 MILD EPISODE OF RECURRENT MAJOR DEPRESSIVE DISORDER (HCC): Primary | ICD-10-CM

## 2022-11-30 ASSESSMENT — PATIENT HEALTH QUESTIONNAIRE - PHQ9
3. TROUBLE FALLING OR STAYING ASLEEP: 2
10. IF YOU CHECKED OFF ANY PROBLEMS, HOW DIFFICULT HAVE THESE PROBLEMS MADE IT FOR YOU TO DO YOUR WORK, TAKE CARE OF THINGS AT HOME, OR GET ALONG WITH OTHER PEOPLE: 1
7. TROUBLE CONCENTRATING ON THINGS, SUCH AS READING THE NEWSPAPER OR WATCHING TELEVISION: 1
9. THOUGHTS THAT YOU WOULD BE BETTER OFF DEAD, OR OF HURTING YOURSELF: 0
6. FEELING BAD ABOUT YOURSELF - OR THAT YOU ARE A FAILURE OR HAVE LET YOURSELF OR YOUR FAMILY DOWN: 0
5. POOR APPETITE OR OVEREATING: 0
SUM OF ALL RESPONSES TO PHQ QUESTIONS 1-9: 7
SUM OF ALL RESPONSES TO PHQ QUESTIONS 1-9: 7
4. FEELING TIRED OR HAVING LITTLE ENERGY: 1
SUM OF ALL RESPONSES TO PHQ QUESTIONS 1-9: 7
SUM OF ALL RESPONSES TO PHQ9 QUESTIONS 1 & 2: 3
SUM OF ALL RESPONSES TO PHQ QUESTIONS 1-9: 7
8. MOVING OR SPEAKING SO SLOWLY THAT OTHER PEOPLE COULD HAVE NOTICED. OR THE OPPOSITE, BEING SO FIGETY OR RESTLESS THAT YOU HAVE BEEN MOVING AROUND A LOT MORE THAN USUAL: 0
2. FEELING DOWN, DEPRESSED OR HOPELESS: 3
1. LITTLE INTEREST OR PLEASURE IN DOING THINGS: 0

## 2022-11-30 ASSESSMENT — ANXIETY QUESTIONNAIRES
1. FEELING NERVOUS, ANXIOUS, OR ON EDGE: 3
2. NOT BEING ABLE TO STOP OR CONTROL WORRYING: 3
3. WORRYING TOO MUCH ABOUT DIFFERENT THINGS: 3
4. TROUBLE RELAXING: 1
GAD7 TOTAL SCORE: 12
IF YOU CHECKED OFF ANY PROBLEMS ON THIS QUESTIONNAIRE, HOW DIFFICULT HAVE THESE PROBLEMS MADE IT FOR YOU TO DO YOUR WORK, TAKE CARE OF THINGS AT HOME, OR GET ALONG WITH OTHER PEOPLE: SOMEWHAT DIFFICULT
7. FEELING AFRAID AS IF SOMETHING AWFUL MIGHT HAPPEN: 0
6. BECOMING EASILY ANNOYED OR IRRITABLE: 1
5. BEING SO RESTLESS THAT IT IS HARD TO SIT STILL: 1
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
## 2022-11-30 NOTE — PATIENT INSTRUCTIONS
Return to see Dr. Maggy Santiago in 2 weeks. Begin calling referrals for ongoing therapy. Contact the numbers below if your mood becomes significantly worse, or if you have more serious thoughts of suicide or self harm. [place] Warmline  ([phone]) 923-WARM (8566)  [employer]    The [employer] is a peer resource available 24/7. The Warmline provides a safe, confidential place to talk and be heard. [facility] [facility]  (115) 313-CARE (4454)  National Suicide Prevention Lifeline    (064) 271-TALK (5382)  www.suicidepreventionlifeline. org    Suicide and Crisis Lifeline  Dial [address] [facility]    (933) [place] (463-0111)  Www.youthline. [facility] ([facility]): [address]  [address] [facility], [address] [place] [address]    [phone] and Press [address]  Text 983814  www. veteranscrisisline. net

## 2022-12-01 ENCOUNTER — OFFICE VISIT (OUTPATIENT)
Dept: ORTHOPEDIC SURGERY | Age: 42
End: 2022-12-01

## 2022-12-01 VITALS — BODY MASS INDEX: 46.24 KG/M2 | WEIGHT: 244.93 LBS | HEIGHT: 61 IN

## 2022-12-01 DIAGNOSIS — M51.27 DISPLACEMENT OF LUMBOSACRAL INTERVERTEBRAL DISC: ICD-10-CM

## 2022-12-01 DIAGNOSIS — M51.26 LUMBAR DISCOGENIC PAIN SYNDROME: ICD-10-CM

## 2022-12-01 DIAGNOSIS — E66.01 MORBID OBESITY WITH BMI OF 45.0-49.9, ADULT (HCC): ICD-10-CM

## 2022-12-01 DIAGNOSIS — M54.9 BACK PAIN, UNSPECIFIED BACK LOCATION, UNSPECIFIED BACK PAIN LATERALITY, UNSPECIFIED CHRONICITY: ICD-10-CM

## 2022-12-01 RX ORDER — TIZANIDINE 4 MG/1
4 TABLET ORAL 3 TIMES DAILY PRN
Qty: 30 TABLET | Refills: 1 | Status: SHIPPED | OUTPATIENT
Start: 2022-12-01

## 2022-12-01 RX ORDER — TRAMADOL HYDROCHLORIDE 50 MG/1
50 TABLET ORAL EVERY 6 HOURS PRN
Qty: 15 TABLET | Refills: 0 | Status: SHIPPED | OUTPATIENT
Start: 2022-12-01 | End: 2022-12-08

## 2022-12-01 RX ORDER — KETOROLAC TROMETHAMINE 10 MG/1
10 TABLET, FILM COATED ORAL 3 TIMES DAILY
Qty: 15 TABLET | Refills: 0 | Status: SHIPPED | OUTPATIENT
Start: 2022-12-01
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
## 2022-12-07 DIAGNOSIS — M51.26 LUMBAR DISCOGENIC PAIN SYNDROME: ICD-10-CM

## 2022-12-07 NOTE — TELEPHONE ENCOUNTER
Prescription Refill     Medication Name:  tramadol   Pharmacy: [facility]  Patient Contact Number:  [phone]

## 2022-12-07 NOTE — TELEPHONE ENCOUNTER
Prescription Refill     Medication Name:  TRAMADOL  Pharmacy: [facility]  Patient Contact Number:  [phone]      PT RETURNED PHONE CALL ABOUT A REFILL FOR TRAMADOL.   PLEASE CALL HER BACK AT THE ABOVE PHONE#

## 2022-12-07 NOTE — TELEPHONE ENCOUNTER
LAST OV - 12/01/2022  LAST REFILL - 12/01/2022  PILL CT - LVM for pt to call us back for info  PN LVL - LVM for pt to call us back for info  F/U - 12/13/2022    LVM for pt to call us back for update. Please advise.

## 2022-12-08 RX ORDER — TRAMADOL HYDROCHLORIDE 50 MG/1
50 TABLET ORAL EVERY 6 HOURS PRN
Qty: 21 TABLET | Refills: 0 | Status: SHIPPED | OUTPATIENT
Start: 2022-12-08 | End: 2022-12-15

## 2022-12-09 ENCOUNTER — TELEPHONE (OUTPATIENT)
Dept: ORTHOPEDIC SURGERY | Age: 42
End: 2022-12-09

## 2022-12-09 NOTE — TELEPHONE ENCOUNTER
General Question     Subject: MRI UPDATE  Patient and /or Facility Request: Nilo Shepherd  Contact Number:  [phone]    PT HAD MRI YESTERDAY AND REQ A CALLBACK AT NUMBER LISTED TO BE NOTIFIED WHEN RESULTS ARE IN TO GO OVER THE. CONTACT PT TO ADVISE.

## 2022-12-13 ENCOUNTER — OFFICE VISIT (OUTPATIENT)
Dept: ORTHOPEDIC SURGERY | Age: 42
End: 2022-12-13

## 2022-12-13 VITALS — BODY MASS INDEX: 46.24 KG/M2 | HEIGHT: 61 IN | WEIGHT: 244.93 LBS

## 2022-12-13 DIAGNOSIS — M47.816 LUMBAR SPONDYLOSIS: ICD-10-CM

## 2022-12-13 DIAGNOSIS — M47.816 LUMBAR FACET ARTHROPATHY: ICD-10-CM
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
## 2022-12-13 NOTE — LETTER
[address]  [address] [facility] [address]  Phone: [phone]  Fax: [phone]           Jorge Alberto High MD      December 13, 2022     Patient: Lauri Keller   MR Number: [id number]   YOB: 1980   Date of Visit: 12/13/2022       Dear Dr. Sharonda Echeverria ref. provider found: Thank you for referring Sammi Cain to me for evaluation/treatment. Below are the relevant portions of my assessment and plan of care. Impression: . Encounter Diagnoses   Name Primary?  Lumbar facet arthropathy     Lumbar spondylosis               Plan:       LSO immobilization for therapeutic benefit  Lumbar stabilization/Fabio flexion home exercise program  Active modification lumbar facet precautions  L MBB L3-L5 bilateral  Continue. Use of OTC NSAIDs GI precaution along with Zanaflex,     She she plans to proceed with bariatric surgery in the near future. Plan is to optimize spine function prior to surgical intervention. Depending on response to L MBB consider formal course of PT facet mobilization techniques. Orders:        Orders Placed This Encounter   Procedures    Breg Quick Draw Lumbar Brace     Patient was prescribed a Breg Quick Draw Lumbar Brace. The lumbar spine will require stabilization / immobilization from this semi-rigid / rigid orthosis to improve their function. The orthosis will assist in protecting the affected area, provide functional support and facilitate healing. This orthosis is required for the following reasons:    Reduce pain by restricting mobility of the trunk    The patient was educated and fit by a healthcare professional with expert knowledge and specialization in brace application while under the direct supervision of the physician. Verbal and written instructions for the use of and application of this item were provided.    They were instructed to contact the office immediately should the brace result in increased pain, decreased sensation, increased swelling or worsening of the condition. Sulma Sánchez MD.      Disclaimer: \"This note was dictated with voice recognition software. Though review and correction are routine, we apologize for any errors. \"         If you have questions, please do not hesitate to call me. I look forward to following Jonatan Crawford along with you.     Sincerely,        Mariel De Luna MD    CC providers:  [facility]  [facility]

## 2022-12-13 NOTE — LETTER
[address]  [address] [facility] [address]  Phone: [phone]  Fax: [phone]    Twin Rosenbaum MD    December 13, 2022     Lennox [place], MD  [facility] [address] [facility] [address]    Patient: Eli Marino   MR Number: [id number]   YOB: 1980   Date of Visit: 12/13/2022       Dear Lennox [place]: Thank you for referring Vandana Meng to me for evaluation/treatment. Below are the relevant portions of my assessment and plan of care. Impression: . No diagnosis found. Plan:        ***         Orders:      No orders of the defined types were placed in this encounter. Emily Oakes MD.      Disclaimer: \"This note was dictated with voice recognition software. Though review and correction are routine, we apologize for any errors. \"         If you have questions, please do not hesitate to call me. I look forward to following Nii along with you.     Sincerely,      Twin Rosenbaum MD

## 2022-12-14 ENCOUNTER — TELEPHONE (OUTPATIENT)
Dept: PRIMARY CARE CLINIC | Age: 42
End: 2022-12-14

## 2022-12-15 ENCOUNTER — TELEPHONE (OUTPATIENT)
Dept: PRIMARY CARE CLINIC | Age: 42
End: 2022-12-15

## 2022-12-15 DIAGNOSIS — J45.20 MILD INTERMITTENT ASTHMA WITHOUT COMPLICATION: ICD-10-CM

## 2022-12-15 DIAGNOSIS — Z23 NEED FOR 23-POLYVALENT PNEUMOCOCCAL POLYSACCHARIDE VACCINE: ICD-10-CM

## 2022-12-15 DIAGNOSIS — E11.9 TYPE 2 DIABETES MELLITUS WITHOUT COMPLICATION, WITH LONG-TERM CURRENT USE OF INSULIN (HCC): ICD-10-CM

## 2022-12-15 DIAGNOSIS — Z79.4 TYPE 2 DIABETES MELLITUS WITHOUT COMPLICATION, WITH LONG-TERM CURRENT USE OF INSULIN (HCC): ICD-10-CM

## 2022-12-15 RX ORDER — ALBUTEROL SULFATE 90 UG/1
2 AEROSOL, METERED RESPIRATORY (INHALATION) EVERY 6 HOURS PRN
Qty: 8.5 G | OUTPATIENT
Start: 2022-12-15

## 2022-12-24 DIAGNOSIS — Z79.4 TYPE 2 DIABETES MELLITUS WITHOUT COMPLICATION, WITH LONG-TERM CURRENT USE OF INSULIN (HCC): ICD-10-CM

## 2022-12-24 DIAGNOSIS — E11.9 TYPE 2 DIABETES MELLITUS WITHOUT COMPLICATION, WITH LONG-TERM CURRENT USE OF INSULIN (HCC): ICD-10-CM

## 2022-12-24 RX ORDER — EMPAGLIFLOZIN 25 MG/1
TABLET, FILM COATED ORAL
Qty: 30 TABLET | Refills: 3 | OUTPATIENT
Start: 2022-12-24

## 2022-12-28 ENCOUNTER — HOSPITAL ENCOUNTER (OUTPATIENT)
Dept: INTERVENTIONAL RADIOLOGY/VASCULAR | Age: 42
Discharge: HOME | End: 2022-12-28
Attending: RADIOLOGY | Admitting: RADIOLOGY
Payer: MEDICARE

## 2022-12-28 DIAGNOSIS — M47.816 LUMBAR FACET ARTHROPATHY: ICD-10-CM

## 2022-12-28 DIAGNOSIS — M47.816 LUMBAR SPONDYLOSIS: ICD-10-CM

## 2022-12-28 LAB
GLUCOSE BLD-MCNC: 296 MG/DL (ref 70–99)
PERFORMED ON: ABNORMAL

## 2022-12-28 PROCEDURE — 64494 INJ PARAVERT F JNT L/S 2 LEV: CPT

## 2022-12-28 PROCEDURE — 64495 INJ PARAVERT F JNT L/S 3 LEV: CPT

## 2022-12-28 PROCEDURE — 2709999900 HC NON-CHARGEABLE SUPPLY

## 2022-12-28 PROCEDURE — 2500000003 HC RX 250 WO HCPCS

## 2022-12-28 PROCEDURE — 64493 INJ PARAVERT F JNT L/S 1 LEV: CPT

## 2022-12-28 PROCEDURE — 6360000002 HC RX W HCPCS

## 2022-12-28 NOTE — H&P
TRANSFORAMINAL EPIDURAL STEROID INJECTION WITH FLUOROSCOPY performed by Inocencio Parr MD at [address] [facility] N/A 12/11/2020    EGD BIOPSY performed by Pamela Koehler MD at [address] [facility]      all 4 removed       Allergies:  Benztropine, Dulaglutide, Fluoxetine, Glipizide, Metformin, Sertraline, Oxycodone-acetaminophen, Sertraline hcl, and Tape [adhesive tape]    Medications:   Home Meds  No current facility-administered medications on file prior to encounter.      Current Outpatient Medications on File Prior to Encounter   Medication Sig Dispense Refill    ketorolac (TORADOL) 10 MG tablet Take 1 tablet by mouth three times daily Discontinue all other NSAIDs during the course of treatment and resumed thereafter 15 tablet 0    tiZANidine (ZANAFLEX) 4 MG tablet Take 1 tablet by mouth 3 times daily as needed (Muscle tightness/spasm) 30 tablet 1    ibuprofen (ADVIL;MOTRIN) 200 MG tablet Take 200 mg by mouth every 6 hours as needed for Pain      mirtazapine (REMERON) 15 MG tablet Take 7.5 mg by mouth nightly      fluconazole (DIFLUCAN) 150 MG tablet Take one now, if not better can repeat in 5-7 days 2 tablet 0    insulin lispro, 1 Unit Dial, (HUMALOG KWIKPEN) 100 UNIT/ML SOPN Inject 13 Units into the skin 3 times daily (before meals) Hold if glucose less than 110 5 Adjustable Dose Pre-filled Pen Syringe 2    insulin glargine (LANTUS SOLOSTAR) 100 UNIT/ML injection pen 55 units in the morning and 45 units in the evening 72 mL 2    fluticasone (FLONASE) 50 MCG/ACT nasal spray Use for 5 days then as needed, avoid using for more than 1 week 16 g 1    fexofenadine (ALLEGRA) 180 MG tablet Take 1 tab at night for 1 week then as needed 30 tablet 0    SITagliptin (JANUVIA) 100 MG tablet Take 1 tablet by mouth daily 90 tablet 1    lisinopril-hydroCHLOROthiazide (PRINZIDE;ZESTORETIC) 20-12.5 MG per tablet Take 1 tablet by mouth daily 90 tablet 1    escitalopram (LEXAPRO) 5 MG tablet TAKE 1 TABLET BY MOUTH EVERY MORNING      lurasidone (LATUDA) 80 MG TABS tablet Take 1 tablet by mouth Daily with supper (Patient taking differently: Take 40 mg by mouth Daily with supper) 30 tablet 0    gabapentin (NEURONTIN) 300 MG capsule TAKE 1 CAPSULE BY MOUTH AT BEDTIME      Insulin Pen Needle 31G X 5 MM MISC 1 each by Does not apply route daily 100 each 3    empagliflozin (JARDIANCE) 25 MG tablet Take 1 tablet by mouth daily 30 tablet 3    albuterol sulfate HFA (PROVENTIL;VENTOLIN;PROAIR) 108 (90 Base) MCG/ACT inhaler Inhale 2 puffs into the lungs every 6 hours as needed for Wheezing or Shortness of Breath 18 g 3    blood glucose monitor kit and supplies Dispense sufficient amount for indicated testing frequency plus additional to accommodate PRN testing needs. Dispense all needed supplies to include: monitor, strips, lancing device, lancets, control solutions, alcohol swabs. 1 kit 0    ondansetron (ZOFRAN ODT) 4 MG disintegrating tablet Take 1 tablet by mouth every 8 hours as needed for Nausea 20 tablet 0    simvastatin (ZOCOR) 5 MG tablet Take 1 tablet by mouth nightly 90 tablet 3    [employer]S Lancets Thin 26G MISC TEST 3 TIMES A DAY AND AS NEEDED FOR SYMPTOMS OF IRREGULAR BLOOD GLUCOSE 300 each 3    blood glucose monitor kit and supplies Test 3 times a day & as needed for symptoms of irregular blood glucose. Diabetes Mellitus E11.9 1 kit 0    blood glucose monitor strips Test three times daily and prn 100 strip 5    pantoprazole (PROTONIX) 40 MG tablet Take 1 tablet by mouth every morning (before breakfast) 30 tablet 5       Current Meds  No current facility-administered medications for this encounter.         ASA 2 - Patient with mild systemic disease with no functional limitations    II (soft palate, uvula, fauces visible)    Activity:  2 - Able to move 4 extremities voluntarily on command  Respiration:  2 - Able to breathe deeply and cough freely  Circulation:  2 - BP+/- 20mmHg of normal  Consciousness:  2 - Fully awake  Oxygen Saturation (color):  2 - Able to maintain oxygen saturation >92% on room air    Sedation : Moderate sedation planned

## 2022-12-28 NOTE — PROCEDURES
IR Brief Postoperative Note    Won Chen  YOB: 1980  [id number]    Pre-operative Diagnosis: facet arthropathy    Post-operative Diagnosis: Same    Procedure: lumbar MBB    Anesthesia: moderate    Surgeons/Assistants: marie    Estimated Blood Loss: Minimal    Complications: none    Specimens: were not obtained    See full procedure dictation to follow      Antonio Emerson MD MD  12/28/2022

## 2022-12-28 NOTE — DISCHARGE INSTRUCTIONS
Lumbar Epidural Steroid Injection  Patient Discharge Instructions      When [address] [facility] should not drive the day of the procedure. You may experience leg weakness during the first 24 hours following the procedure. To prevent yourself from falling, it is important to have someone help you walk. However, you do not need to stay in bed when you get home. In fact, it is best to walk around if you feel up to it, but you will need assistance during the first 24 hours following the epidural steroid injection. Even if you feel better right away, avoid activities that may strain your back. Remove bandaid(s) within 24 hours. When to Call Your Doctor    Call right away if you notice any of the following symptoms:    Severe pain or headache;  Fever or chills; Redness or swelling around the injection site. Loss of bladder or bowel control. You may contact [address] [place] [address] [facility]. for any questions or problems that may occur at [phone] during the hours of 9am-5pm Monday-Friday, or the hospital  after hours at [phone] to have the interventional radiologist on call paged. [facility] [employer]  Cardiovascular Special Procedures  General Discharge Instructions    PROCEDURE: _________Medial Branch Block______    __x__ Zuri Rim may be drowsy or lightheaded after receiving sedation. DO NOT operate a vehicle (automobile, bicycle, motorcycle, machinery, or power tools), make any important decisions or sign any important/legal documents, or drink alcoholic beverages for the next 24 hours  __x__ We strongly suggest that a responsible adult be with you for the next 24 hours for your protection and safety  ____ If the intravenous catheter site is painful, apply warm wet compresses on the site until the soreness is relieved and elevate the arm above the heart.   Call your physician if no improvement in 2 to 3 days    DIETARY INSTRUCTIONS:    ____ Drink extra fluids over the next 24 hours (If not contraindicated by illness or by                   physician order)  ____ Start with clear liquids and progress to normal diet as you feel like eating. If you experience nausea or repeated episodes of vomiting, which persist beyond 12-24 hours, notify your doctor        _x___ Resume your previous diet    ACTIVITY INSTRUCTIONS:    ____ See other instructions  ____ No special instructions  ____ Rest for 24 hours    ___x_ Up as tolerated  ___x_ Increase activity as tolerated    Wound/Dressing Instructions:  ____ See other instructions  __x__ May shower, tomorrow  __x__ Remove bandage within 24 hours    MEDICATION INSTRUCTIONS:    __x__ See Medication Reconciliation Sheet      SPECIAL INSTRUCTIONS:  Please make sure that you follow-up with your doctors office for your results. FOLLOW-UP APPOINTMENT    Follow up with MD in 2 weeks. Belongings returned to patient and/or family: Yes. The Discharge Instructions have been explained to me. I understand and can verbalize these instructions.

## 2022-12-30 DIAGNOSIS — M47.816 LUMBAR FACET ARTHROPATHY: Primary | ICD-10-CM

## 2022-12-30 RX ORDER — HYDROCODONE BITARTRATE AND ACETAMINOPHEN 5; 325 MG/1; MG/1
1 TABLET ORAL EVERY 8 HOURS PRN
Qty: 15 TABLET | Refills: 0 | Status: SHIPPED | OUTPATIENT
Start: 2022-12-30 | End: 2023-01-04

## 2023-01-03 ENCOUNTER — OFFICE VISIT (OUTPATIENT)
Dept: ORTHOPEDIC SURGERY | Age: 43
End: 2023-01-03
Payer: MEDICARE

## 2023-01-03 ENCOUNTER — TELEPHONE (OUTPATIENT)
Dept: ORTHOPEDIC SURGERY | Age: 43
End: 2023-01-03

## 2023-01-03 VITALS — WEIGHT: 244 LBS | HEIGHT: 60 IN | BODY MASS INDEX: 47.91 KG/M2

## 2023-01-03 DIAGNOSIS — M25.531 RIGHT WRIST PAIN: Primary | ICD-10-CM

## 2023-01-03 DIAGNOSIS — G56.01 RIGHT CARPAL TUNNEL SYNDROME: ICD-10-CM

## 2023-01-03 PROCEDURE — G8417 CALC BMI ABV UP PARAM F/U: HCPCS | Performed by: NURSE PRACTITIONER

## 2023-01-03 PROCEDURE — G8482 FLU IMMUNIZE ORDER/ADMIN: HCPCS | Performed by: NURSE PRACTITIONER

## 2023-01-03 PROCEDURE — G8427 DOCREV CUR MEDS BY ELIG CLIN: HCPCS | Performed by: NURSE PRACTITIONER

## 2023-01-03 PROCEDURE — 99214 OFFICE O/P EST MOD 30 MIN: CPT | Performed by: NURSE PRACTITIONER

## 2023-01-03 PROCEDURE — 1036F TOBACCO NON-USER: CPT | Performed by: NURSE PRACTITIONER
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
## 2023-01-03 NOTE — TELEPHONE ENCOUNTER
Lizbet Hernandez [id number]    Date: 01/12/23 thru 04/12/23  Type of SX:  Outpatient  Location: [facility]  CPT: 74243   DX Code: G56.01  SX area: Rt hand/wrist  Insurance: [employer]

## 2023-01-03 NOTE — LETTER
[facility]  Surgery Scheduling Form:    DEMOGRAPHICS:                                                                                                                  Patient Name:  Ronnie Sullivan  Patient :  1980   Patient SS#:      Patient Phone:  [phone] (home)  Alt. Patient Phone:    Patient Address:  [place] [address]    PCP:  Farrah Hay MD     Insurance ID Number:  Payer/Plan Subscr  Sex Relation Sub. Ins. ID Effective Group Num   1. [address] 1980 Female Self [id number]                                   [address] [address]   2. [place] MARYBEL LORD 1980 Female Self [id number]                                    [address]         DIAGNOSIS & PROCEDURE:                                                                                            .    Diagnosis:   right  carpal tunnel syndrome G56.01  Operation:    right carpal tunnel release [id number]  Location: [address] [place] [address]  Provider:  Jo-Ann Deras MD      [facility] INFORMATION:                                                                                             Requested Date:       OR Time:                        Patient Arrival Time:    OR Time Required:  15  Minutes  Anesthesia:  General  Equipment:    Mini C-Arm:  No   Standard C-Arm:  No  Status:  Outpatient  PAT Required:  No  Comments:            Kt Deras MD     23         Pre Operative Physician Prophylaxis Orders - SCIP Protocols      Patient: Ronnie Comment  :    1980    Procedure:     HEIGHT:  Ht Readings from Last 1 Encounters:   23 5' (1.524 m)                      WEIGHT:  Wt Readings from Last 1 Encounters:   23 244 lb (110.7 kg)         History & Physical:  By Surgeon    Pre-Operative Antibiotic Order:     []  ----  No Antibiotic Ordered       [x]  ----  Give the following Antibiotic within 1 hour prior to start time: Cefazolin 2g IV <119.9kg (264lbs) OR 3g if >120kg ([id number])       or      Clindamycin 900 mg IV (over 1 hour) if patient is allergic to           PENICILLINS or CAPHALOSPORIN       VTE prophylaxis [ ] Thigh hi  TEDS  [ ]  Knee Hi TEDS [ ] Foot Pumps [ ] Compression Devices      Physician Signature:     Date: 1/3/23  Time: 8:56 AM

## 2023-01-11 ENCOUNTER — TELEPHONE (OUTPATIENT)
Dept: ORTHOPEDIC SURGERY | Age: 43
End: 2023-01-11

## 2023-01-11 ENCOUNTER — OFFICE VISIT (OUTPATIENT)
Dept: ORTHOPEDIC SURGERY | Age: 43
End: 2023-01-11
Payer: MEDICARE

## 2023-01-11 VITALS — HEIGHT: 61 IN | WEIGHT: 244.05 LBS | BODY MASS INDEX: 46.08 KG/M2

## 2023-01-11 DIAGNOSIS — M47.816 LUMBAR SPONDYLOSIS: ICD-10-CM

## 2023-01-11 DIAGNOSIS — M47.816 LUMBAR FACET ARTHROPATHY: Primary | ICD-10-CM

## 2023-01-11 PROCEDURE — G8427 DOCREV CUR MEDS BY ELIG CLIN: HCPCS | Performed by: INTERNAL MEDICINE

## 2023-01-11 PROCEDURE — 99214 OFFICE O/P EST MOD 30 MIN: CPT | Performed by: INTERNAL MEDICINE

## 2023-01-11 PROCEDURE — 1036F TOBACCO NON-USER: CPT | Performed by: INTERNAL MEDICINE

## 2023-01-11 PROCEDURE — G8417 CALC BMI ABV UP PARAM F/U: HCPCS | Performed by: INTERNAL MEDICINE

## 2023-01-11 PROCEDURE — G8482 FLU IMMUNIZE ORDER/ADMIN: HCPCS | Performed by: INTERNAL MEDICINE

## 2023-01-11 NOTE — LETTER
[address]  [address] [facility] [address]  Phone: [phone]  Fax: [phone]           Amrita Vera MD      January 11, 2023     Patient: Won Chen   MR Number: [id number]   YOB: 1980   Date of Visit: 1/11/2023       Dear Dr. Carlee Tamayo ref. provider found: Thank you for referring Garfield Vick to me for evaluation/treatment. Below are the relevant portions of my assessment and plan of care. Impression: . Encounter Diagnoses   Name Primary?  Lumbar facet arthropathy Yes    Lumbar spondylosis               Plan:     Proceed with second L MBB-L3-L5 bilateral  Proceed with carpal tunnel surgery as scheduled tomorrow  Medical pain management as per previous Motrin and Zanaflex as needed  Post procedure pain control Sears for second L MBB as needed  Treated with lumbar RFA as definitive treatment measure pending response to second diagnostic L MBB       Orders:      No orders of the defined types were placed in this encounter. Aileen Maradiaga MD.      Disclaimer: \"This note was dictated with voice recognition software. Though review and correction are routine, we apologize for any errors. \"       If you have questions, please do not hesitate to call me. I look forward to following Sher Ramirez along with you.     Sincerely,        Amrita Vera MD    CC providers:  Mauricio Rees ATC  [facility]
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
## 2023-01-17 ENCOUNTER — OFFICE VISIT (OUTPATIENT)
Dept: FAMILY MEDICINE CLINIC | Age: 43
End: 2023-01-17
Payer: MEDICARE

## 2023-01-17 VITALS
SYSTOLIC BLOOD PRESSURE: 102 MMHG | BODY MASS INDEX: 46.52 KG/M2 | OXYGEN SATURATION: 98 % | WEIGHT: 246.4 LBS | TEMPERATURE: 97.2 F | HEART RATE: 103 BPM | HEIGHT: 61 IN | DIASTOLIC BLOOD PRESSURE: 72 MMHG

## 2023-01-17 DIAGNOSIS — I10 PRIMARY HYPERTENSION: ICD-10-CM

## 2023-01-17 DIAGNOSIS — M54.9 UPPER BACK PAIN: ICD-10-CM

## 2023-01-17 DIAGNOSIS — Z12.31 ENCOUNTER FOR SCREENING MAMMOGRAM FOR MALIGNANT NEOPLASM OF BREAST: ICD-10-CM

## 2023-01-17 DIAGNOSIS — E55.9 VITAMIN D DEFICIENCY: ICD-10-CM

## 2023-01-17 DIAGNOSIS — F41.9 ANXIETY AND DEPRESSION: ICD-10-CM

## 2023-01-17 DIAGNOSIS — G47.33 OBSTRUCTIVE SLEEP APNEA SYNDROME: ICD-10-CM

## 2023-01-17 DIAGNOSIS — H65.93 FLUID LEVEL BEHIND TYMPANIC MEMBRANE OF BOTH EARS: ICD-10-CM

## 2023-01-17 DIAGNOSIS — Z79.4 TYPE 2 DIABETES MELLITUS WITH HYPERGLYCEMIA, WITH LONG-TERM CURRENT USE OF INSULIN (HCC): Primary | ICD-10-CM

## 2023-01-17 DIAGNOSIS — F32.A ANXIETY AND DEPRESSION: ICD-10-CM

## 2023-01-17 DIAGNOSIS — E78.2 MIXED HYPERLIPIDEMIA: ICD-10-CM

## 2023-01-17 DIAGNOSIS — E11.65 TYPE 2 DIABETES MELLITUS WITH HYPERGLYCEMIA, WITH LONG-TERM CURRENT USE OF INSULIN (HCC): Primary | ICD-10-CM

## 2023-01-17 LAB
BILIRUBIN, POC: NEGATIVE
BLOOD URINE, POC: NEGATIVE
CLARITY, POC: ABNORMAL
COLOR, POC: ABNORMAL
GLUCOSE URINE, POC: ABNORMAL
HBA1C MFR BLD: 11.5 %
KETONES, POC: NEGATIVE
LEUKOCYTE EST, POC: NEGATIVE
NITRITE, POC: NEGATIVE
PH, POC: 5
PROTEIN, POC: NEGATIVE
SPECIFIC GRAVITY, POC: 1.01
UROBILINOGEN, POC: ABNORMAL

## 2023-01-17 PROCEDURE — 2022F DILAT RTA XM EVC RTNOPTHY: CPT | Performed by: FAMILY MEDICINE

## 2023-01-17 PROCEDURE — 3074F SYST BP LT 130 MM HG: CPT | Performed by: FAMILY MEDICINE

## 2023-01-17 PROCEDURE — G8482 FLU IMMUNIZE ORDER/ADMIN: HCPCS | Performed by: FAMILY MEDICINE

## 2023-01-17 PROCEDURE — 3078F DIAST BP <80 MM HG: CPT | Performed by: FAMILY MEDICINE

## 2023-01-17 PROCEDURE — 3046F HEMOGLOBIN A1C LEVEL >9.0%: CPT | Performed by: FAMILY MEDICINE

## 2023-01-17 PROCEDURE — 99214 OFFICE O/P EST MOD 30 MIN: CPT | Performed by: FAMILY MEDICINE

## 2023-01-17 PROCEDURE — G8417 CALC BMI ABV UP PARAM F/U: HCPCS | Performed by: FAMILY MEDICINE

## 2023-01-17 PROCEDURE — 1036F TOBACCO NON-USER: CPT | Performed by: FAMILY MEDICINE

## 2023-01-17 PROCEDURE — 83036 HEMOGLOBIN GLYCOSYLATED A1C: CPT | Performed by: FAMILY MEDICINE

## 2023-01-17 PROCEDURE — 81002 URINALYSIS NONAUTO W/O SCOPE: CPT | Performed by: FAMILY MEDICINE

## 2023-01-17 PROCEDURE — G8427 DOCREV CUR MEDS BY ELIG CLIN: HCPCS | Performed by: FAMILY MEDICINE

## 2023-01-17 RX ORDER — LURASIDONE HYDROCHLORIDE 60 MG/1
60 TABLET, FILM COATED ORAL NIGHTLY
COMMUNITY
Start: 2023-01-05

## 2023-01-17 RX ORDER — CYCLOBENZAPRINE HCL 10 MG
10 TABLET ORAL 3 TIMES DAILY PRN
Qty: 30 TABLET | Refills: 0 | Status: SHIPPED | OUTPATIENT
Start: 2023-01-17 | End: 2023-01-27

## 2023-01-17 RX ORDER — MIRTAZAPINE 7.5 MG/1
1 TABLET, FILM COATED ORAL NIGHTLY
COMMUNITY
Start: 2023-01-04

## 2023-01-17 RX ORDER — FLUTICASONE PROPIONATE 50 MCG
SPRAY, SUSPENSION (ML) NASAL
Qty: 16 G | Refills: 1 | Status: SHIPPED | OUTPATIENT
Start: 2023-01-17 | End: 2023-01-17

## 2023-01-17 RX ORDER — IBUPROFEN 800 MG/1
1 TABLET ORAL
COMMUNITY
Start: 2023-01-15

## 2023-01-17 RX ORDER — BUSPIRONE HYDROCHLORIDE 10 MG/1
1 TABLET ORAL 2 TIMES DAILY
COMMUNITY
Start: 2023-01-04

## 2023-01-17 RX ORDER — PROMETHAZINE HYDROCHLORIDE 25 MG/1
1 TABLET ORAL
COMMUNITY
Start: 2023-01-02

## 2023-01-17 SDOH — ECONOMIC STABILITY: FOOD INSECURITY: WITHIN THE PAST 12 MONTHS, YOU WORRIED THAT YOUR FOOD WOULD RUN OUT BEFORE YOU GOT MONEY TO BUY MORE.: NEVER TRUE

## 2023-01-17 SDOH — ECONOMIC STABILITY: FOOD INSECURITY: WITHIN THE PAST 12 MONTHS, THE FOOD YOU BOUGHT JUST DIDN'T LAST AND YOU DIDN'T HAVE MONEY TO GET MORE.: NEVER TRUE

## 2023-01-17 ASSESSMENT — PATIENT HEALTH QUESTIONNAIRE - PHQ9
7. TROUBLE CONCENTRATING ON THINGS, SUCH AS READING THE NEWSPAPER OR WATCHING TELEVISION: 3
SUM OF ALL RESPONSES TO PHQ QUESTIONS 1-9: 15
SUM OF ALL RESPONSES TO PHQ QUESTIONS 1-9: 14
10. IF YOU CHECKED OFF ANY PROBLEMS, HOW DIFFICULT HAVE THESE PROBLEMS MADE IT FOR YOU TO DO YOUR WORK, TAKE CARE OF THINGS AT HOME, OR GET ALONG WITH OTHER PEOPLE: 1
1. LITTLE INTEREST OR PLEASURE IN DOING THINGS: 1
2. FEELING DOWN, DEPRESSED OR HOPELESS: 3
5. POOR APPETITE OR OVEREATING: 0
SUM OF ALL RESPONSES TO PHQ9 QUESTIONS 1 & 2: 4
3. TROUBLE FALLING OR STAYING ASLEEP: 3
9. THOUGHTS THAT YOU WOULD BE BETTER OFF DEAD, OR OF HURTING YOURSELF: 1
4. FEELING TIRED OR HAVING LITTLE ENERGY: 1
SUM OF ALL RESPONSES TO PHQ QUESTIONS 1-9: 15
6. FEELING BAD ABOUT YOURSELF - OR THAT YOU ARE A FAILURE OR HAVE LET YOURSELF OR YOUR FAMILY DOWN: 3
SUM OF ALL RESPONSES TO PHQ QUESTIONS 1-9: 15
8. MOVING OR SPEAKING SO SLOWLY THAT OTHER PEOPLE COULD HAVE NOTICED. OR THE OPPOSITE, BEING SO FIGETY OR RESTLESS THAT YOU HAVE BEEN MOVING AROUND A LOT MORE THAN USUAL: 0

## 2023-01-17 ASSESSMENT — COLUMBIA-SUICIDE SEVERITY RATING SCALE - C-SSRS
1. WITHIN THE PAST MONTH, HAVE YOU WISHED YOU WERE DEAD OR WISHED YOU COULD GO TO SLEEP AND NOT WAKE UP?: YES
6. HAVE YOU EVER DONE ANYTHING, STARTED TO DO ANYTHING, OR PREPARED TO DO ANYTHING TO END YOUR LIFE?: NO
2. HAVE YOU ACTUALLY HAD ANY THOUGHTS OF KILLING YOURSELF?: NO

## 2023-01-17 ASSESSMENT — SOCIAL DETERMINANTS OF HEALTH (SDOH): HOW HARD IS IT FOR YOU TO PAY FOR THE VERY BASICS LIKE FOOD, HOUSING, MEDICAL CARE, AND HEATING?: NOT HARD AT ALL

## 2023-01-17 NOTE — TELEPHONE ENCOUNTER
Please cancel patient appt with me 2/13 for a physical as she has establish care at [facility].      Thanks     Dr. Jeanette Stephenson

## 2023-01-17 NOTE — TELEPHONE ENCOUNTER
Medication:   Requested Prescriptions     Pending Prescriptions Disp Refills    fluticasone (FLONASE) 50 MCG/ACT nasal spray [Pharmacy Med Name: FLUTICASONE 50MCG NASAL SP (120) RX] 16 g 1     Sig: USE AS DIRECTED FOR 5 DAYS THEN AS NEEDED. AVOID USING FOR MORE THAN 1 WEEK     Last Filled:  11.21.22    Last appt: 11/21/2022   Next appt: 2/13/2023    Last OARRS:   RX Monitoring 9/13/2019   Attestation -   Periodic Controlled Substance Monitoring Possible medication side effects, risk of tolerance/dependence & alternative treatments discussed. ;No signs of potential drug abuse or diversion identified.    Chronic Pain > 80 MEDD -

## 2023-01-17 NOTE — TELEPHONE ENCOUNTER
Medication:   Requested Prescriptions     Pending Prescriptions Disp Refills    fluticasone (FLONASE) 50 MCG/ACT nasal spray [Pharmacy Med Name: FLUTICASONE 50MCG NASAL SP (120) RX] 48 g      Sig: INSERT 1 SPRAY INTO EACH NOSTRIL DAILY AS NEEDED. AVOID USING FOR MORE THAN 1 WEEK        Last Filled:      Patient Phone Number: [phone] (home)     Last appt: 11/21/2022   Next appt: 2/13/2023    Last OARRS:   RX Monitoring 9/13/2019   Attestation -   Periodic Controlled Substance Monitoring Possible medication side effects, risk of tolerance/dependence & alternative treatments discussed. ;No signs of potential drug abuse or diversion identified. Chronic Pain > 80 MEDD -       Preferred Pharmacy:   [address] [place] [address]  [address]  Phone: [phone] Fax: [address], [place] [phone] - F [phone]  [facility] [address]  Phone: [phone] Fax: [phone]  Medication:   Requested Prescriptions     Pending Prescriptions Disp Refills    fluticasone (FLONASE) 50 MCG/ACT nasal spray [Pharmacy Med Name: FLUTICASONE 50MCG NASAL SP (120) RX] 48 g      Sig: INSERT 1 SPRAY INTO EACH NOSTRIL DAILY AS NEEDED. AVOID USING FOR MORE THAN 1 WEEK        Last Filled:      Patient Phone Number: [phone] (home)     Last appt: 11/21/2022   Next appt: 2/13/2023    Last OARRS:   RX Monitoring 9/13/2019   Attestation -   Periodic Controlled Substance Monitoring Possible medication side effects, risk of tolerance/dependence & alternative treatments discussed. ;No signs of potential drug abuse or diversion identified.    Chronic Pain > 80 MEDD -       Preferred Pharmacy:   [id number] [place] [address] W  [address]  Phone: [phone] Fax: [facility] [place] - [address]  [address]  [place] [address]  Phone: [phone] Fax: [phone]

## 2023-01-18 PROBLEM — S83.241A ACUTE MEDIAL MENISCUS TEAR OF RIGHT KNEE: Status: RESOLVED | Noted: 2022-03-21 | Resolved: 2023-01-18

## 2023-01-18 RX ORDER — FLUTICASONE PROPIONATE 50 MCG
SPRAY, SUSPENSION (ML) NASAL
Qty: 48 G | OUTPATIENT
Start: 2023-01-18

## 2023-01-18 NOTE — PROGRESS NOTES
Chief Complaint: Established New Doctor (previous PCP was Nate Li MD; switched due to moving further away ), Depression (F/U; reports she isn't doing well managing depression at this time ), Diabetes (Type 2 Diabetic F/U; last A1C= 11.2% on 9/03/2022), and Flank Pain (left flank pain radiating from side abdomen to lower back x4 days )       HPI: She is here to establish care. She has a history of severe depression and anxiety and has been following up with [facility] psychiatry. She states currently she is on Remeron 7.5 mg at bedtime BuSpar 10 mg twice a day and Latuda 60 mg daily  Still continues to have symptoms. She has a history of type 2 diabetes which is poorly controlled her A1c  11.5. Currently taking Lantus 55 units in the morning 45 units in the evening Humalog 3 times 8 times a day and Januvia 100 mg daily Jardiance 25 mg daily. She does eat desserts. She has not been able to exercise. She has history of hypertension for which she is on Prinzide 20/12.5 mg daily. She is taking simvastatin 5 mg daily to help her lipid levels. She has chronic lower back pain for which she has been taking gabapentin 300 mg 3 times a day. He has a history of obstructive sleep apnea and has not been using the CPAP machine. She had a sleep studies done at [facility]. Has a history of migraine and uses Nurtec daily    Patient's problem list, medications, allergies, past medical, surgical, social and family histories were reviewed and updated as appropriate.      Current Outpatient Medications   Medication Sig Dispense Refill    mirtazapine (REMERON) 7.5 MG tablet Take 1 tablet by mouth at bedtime      ibuprofen (ADVIL;MOTRIN) 800 MG tablet Take 1 tablet by mouth every 6-8 hours as needed      busPIRone (BUSPAR) 10 MG tablet Take 1 tablet by mouth in the morning and at bedtime      promethazine (PHENERGAN) 25 MG tablet Take 1 tablet by mouth every 4-6 hours as needed      LATUDA 60 MG TABS tablet Take 60 mg by mouth at bedtime      cyclobenzaprine (FLEXERIL) 10 MG tablet Take 1 tablet by mouth 3 times daily as needed for Muscle spasms 30 tablet 0    Rimegepant Sulfate (NURTEC PO) Take by mouth      insulin lispro, 1 Unit Dial, (HUMALOG KWIKPEN) 100 UNIT/ML SOPN Inject 13 Units into the skin 3 times daily (before meals) Hold if glucose less than 110 5 Adjustable Dose Pre-filled Pen Syringe 2    insulin glargine (LANTUS SOLOSTAR) 100 UNIT/ML injection pen 55 units in the morning and 45 units in the evening 72 mL 2    SITagliptin (JANUVIA) 100 MG tablet Take 1 tablet by mouth daily 90 tablet 1    lisinopril-hydroCHLOROthiazide (PRINZIDE;ZESTORETIC) 20-12.5 MG per tablet Take 1 tablet by mouth daily 90 tablet 1    gabapentin (NEURONTIN) 300 MG capsule TAKE 1 CAPSULE BY MOUTH AT BEDTIME      Insulin Pen Needle 31G X 5 MM MISC 1 each by Does not apply route daily 100 each 3    empagliflozin (JARDIANCE) 25 MG tablet Take 1 tablet by mouth daily 30 tablet 3    albuterol sulfate HFA (PROVENTIL;VENTOLIN;PROAIR) 108 (90 Base) MCG/ACT inhaler Inhale 2 puffs into the lungs every 6 hours as needed for Wheezing or Shortness of Breath 18 g 3    ondansetron (ZOFRAN ODT) 4 MG disintegrating tablet Take 1 tablet by mouth every 8 hours as needed for Nausea 20 tablet 0    simvastatin (ZOCOR) 5 MG tablet Take 1 tablet by mouth nightly 90 tablet 3    [employer] Lancets Thin 26G MISC TEST 3 TIMES A DAY AND AS NEEDED FOR SYMPTOMS OF IRREGULAR BLOOD GLUCOSE 300 each 3    blood glucose monitor kit and supplies Test 3 times a day & as needed for symptoms of irregular blood glucose. Diabetes Mellitus E11.9 1 kit 0    blood glucose monitor strips Test three times daily and prn 100 strip 5    pantoprazole (PROTONIX) 40 MG tablet Take 1 tablet by mouth every morning (before breakfast) 30 tablet 5     No current facility-administered medications for this visit.        Social History     Tobacco Use    Smoking status: Never    Smokeless tobacco: Never Tobacco comments:     around 2nd hand smoke    Substance Use Topics    Alcohol use: No            Review of Systems:  Constitutional: Negative for appetite change, fatigue, fever and unexpected weight change. HENT: Negative    Respiratory: Negative for cough, chest tightness, shortness of breath and wheezing. Cardiovascular: Negative for chest pain, palpitations and leg swelling. Gastrointestinal: Has nausea. Genitourinary: Negative for difficulty urinating, flank pain, frequency, hematuria and urgency. Musculoskeletal: Complains of upper back pain  Skin: Negative for color change, pallor, rash and wound. Neurological: Negative for dizziness, tremors, seizures, syncope, facial asymmetry, speech difficulty, weakness, light-headedness, numbness and headaches. Psychiatric/Behavioral: As mentioned above        Objective:     Vitals:    01/17/23 0931   BP: 102/72   Pulse: (!) 103   Temp: 97.2 °F (36.2 °C)   TempSrc: Temporal   SpO2: 98%   Weight: 246 lb 6.4 oz (111.8 kg)   Height: 5' 1\" (1.549 m)     Body mass index is 46.56 kg/m². Wt Readings from Last 3 Encounters:   01/17/23 246 lb 6.4 oz (111.8 kg)   01/11/23 244 lb 0.8 oz (110.7 kg)   01/04/23 244 lb (110.7 kg)     BP Readings from Last 3 Encounters:   01/17/23 102/72   11/21/22 110/80   11/10/22 128/84       Physical exam:  Constitutional: she is oriented to person, place, and time. she appears well-developed and well-nourished. No distress. Neck: Normal range of motion. No JVD present. No tracheal deviation present. No thyromegaly present. Cardiovascular: Normal rate, regular rhythm, normal heart sounds and intact distal pulses. No murmur heard. Pulmonary/Chest: Effort normal and breath sounds normal. No stridor. No respiratory distress. she has no wheezes. she has no rales. sheexhibits no tenderness. Abdominal: Soft. Bowel sounds are normal. she exhibits no distension and no mass. There is no tenderness.  There is no rebound and no guarding. Musculoskeletal: Normal lumbar and thoracic spine exam.  Normal gait and lower extremity strength  Lymphadenopathy:     she has no cervical adenopathy. Neurological:she is alert and oriented to person, place, and time. she  has normal reflexes. No cranial nerve deficit. Coordination normal.   Skin: Skin is warm and dry. No rash noted. she is not diaphoretic. No erythema. No pallor. Psychiatric: she has a normal mood and affect. her   behavior is normal.         Health Maintenance   Topic Date Due    Diabetic retinal exam  01/21/2022    Hepatitis B vaccine (3 of 3 - Risk 3-dose series) 04/10/2022    DTaP/Tdap/Td vaccine (2 - Td or Tdap) 10/02/2022    Diabetic foot exam  02/15/2023    A1C test (Diabetic or Prediabetic)  04/17/2023    Diabetic Alb to Cr ratio (uACR) test  08/26/2023    GFR test (Diabetes, CKD 3-4, OR last GFR 15-59)  09/02/2023    Lipids  10/30/2023    Depression Monitoring  01/17/2024    Cervical cancer screen  11/30/2024    Flu vaccine  Completed    Pneumococcal 0-64 years Vaccine  Completed    COVID-19 Vaccine  Completed    Hepatitis C screen  Completed    HIV screen  Completed    Hepatitis A vaccine  Aged Out    Hib vaccine  Aged Out    Meningococcal (ACWY) vaccine  Aged Out    Varicella vaccine  Discontinued          Assessment/Plan:     1. Type 2 diabetes mellitus with hyperglycemia, with long-term current use of insulin ([facility] [address]  Poorly controlled type 2 diabetes  Discussed the importance of diet and complications poorly controlled diabetes. Continue the Lantus 55 units in the morning 45 units at nighttime and Humalog 13 units 3 times a day. Januvia 100 mg daily and Jardiance 25 mg daily  Will recheck in 3 months    2. Mixed hyperlipidemia  - Lipid Panel; Future    3. Vitamin D deficiency  Has a history of vitamin D deficiency advised to get  - Vitamin D 25 Hydroxy; Future    4. Upper back pain  - cyclobenzaprine (FLEXERIL) 10 MG tablet;  Take 1 tablet by mouth 3 times daily as needed for Muscle spasms  Dispense: 30 tablet; Refill: 0    5. Obstructive sleep apnea syndrome  - Jude Fraser MD, Pulmonary, [facility]    6. Anxiety and depression  Currently seeing psychiatrist  Continue the Remeron 7.5 mg daily and Latuda 60 mg daily and BuSpar 10 mg daily  - [address] PHP/IOP Group Therapy Program    7. Encounter for screening mammogram for malignant neoplasm of breast  - [facility] DIGITAL SCREEN W OR WO CAD BILATERAL; Future    8. Primary hypertension  Stable continue the Prinzide 20/12.5 mg daily    Follow-up in 3 months       No follow-ups on file.     Kathi King MD  1/18/2023 6:49 AM

## 2023-01-23 NOTE — DISCHARGE INSTRUCTIONS
Lumbar Epidural Steroid Injection  Patient Discharge Instructions      When [address] [facility] should not drive the day of the procedure. You may experience leg weakness during the first 24 hours following the procedure. To prevent yourself from falling, it is important to have someone help you walk. However, you do not need to stay in bed when you get home. In fact, it is best to walk around if you feel up to it, but you will need assistance during the first 24 hours following the epidural steroid injection. Even if you feel better right away, avoid activities that may strain your back. Keep in mind that some patients may feel increased pain for the first 24 hours. You should start feeling some pain relief 2-3 days following the injection. This is because the steroid will start working within three days of the injection with maximal effect by one week. At that time, we will evaluate your pain level to determine the need for another steroid injection. Remove bandaid(s) within 24 hours. When to Call Your Doctor    Call right away if you notice any of the following symptoms:    Severe pain or headache;  Fever or chills; Redness or swelling around the injection site. Loss of bladder or bowel control. You may contact [address] [place] [address] [facility]. for any questions or problems that may occur at [phone] during the hours of 9am-4pm Monday-Friday, or the hospital  after hours at [phone] to have the interventional radiologist on call paged. [employer] [facility]  Cardiovascular Special Procedures  General Discharge Instructions    ____ You may be drowsy or lightheaded after receiving sedation.  DO NOT operate a vehicle (automobile, bicycle, motorcycle, machinery, or power tools), make any important decisions or sign any important/legal documents, or drink alcoholic beverages for the next 24 hours  __x__ We strongly suggest that a responsible adult be with you for the next 24 hours for your protection and safety  ____ If the intravenous catheter site is painful, apply warm wet compresses on the site until the soreness is relieved and elevate the arm above the heart. Call your physician if no improvement in 2 to 3 days    DIETARY INSTRUCTIONS:    ____ Start with clear liquids and progress to normal diet as you feel like eating. If you experience nausea or repeated episodes of vomiting, which persist beyond 12-24 hours, notify your doctor        __x__ Resume your previous diet    ACTIVITY INSTRUCTIONS:    ____ See other instructions  ____ No special instructions  ____ Rest for 24 hours    _x___ Up as tolerated  _x___ Increase activity as tolerated    Wound/Dressing Instructions:  ____ See other instructions  _x___ May shower, tomorrow  _x__ Remove bandage within 24 hours    MEDICATION INSTRUCTIONS:    __x__ See Medication Reconciliation Sheet                                                                                                                                                                                                                                                                                                   The Discharge Instructions have been explained to me. I understand and can verbalize these instructions.

## 2023-01-24 ENCOUNTER — HOSPITAL ENCOUNTER (OUTPATIENT)
Dept: INTERVENTIONAL RADIOLOGY/VASCULAR | Age: 43
Discharge: HOME | End: 2023-01-24

## 2023-02-03 ENCOUNTER — TELEPHONE (OUTPATIENT)
Dept: FAMILY MEDICINE CLINIC | Age: 43
End: 2023-02-03

## 2023-02-03 ENCOUNTER — HOSPITAL ENCOUNTER (OUTPATIENT)
Dept: MAMMOGRAPHY | Age: 43
Discharge: HOME | End: 2023-02-03

## 2023-02-03 DIAGNOSIS — E55.9 VITAMIN D DEFICIENCY: ICD-10-CM

## 2023-02-03 DIAGNOSIS — Z79.4 TYPE 2 DIABETES MELLITUS WITH HYPERGLYCEMIA, WITH LONG-TERM CURRENT USE OF INSULIN (HCC): ICD-10-CM

## 2023-02-03 DIAGNOSIS — Z12.31 VISIT FOR SCREENING MAMMOGRAM: ICD-10-CM

## 2023-02-03 DIAGNOSIS — N64.4 BREAST PAIN, RIGHT: Primary | ICD-10-CM

## 2023-02-03 DIAGNOSIS — Z12.31 ENCOUNTER FOR SCREENING MAMMOGRAM FOR MALIGNANT NEOPLASM OF BREAST: ICD-10-CM

## 2023-02-03 DIAGNOSIS — E78.2 MIXED HYPERLIPIDEMIA: ICD-10-CM

## 2023-02-03 DIAGNOSIS — E11.65 TYPE 2 DIABETES MELLITUS WITH HYPERGLYCEMIA, WITH LONG-TERM CURRENT USE OF INSULIN (HCC): ICD-10-CM

## 2023-02-03 LAB
ANION GAP SERPL CALCULATED.3IONS-SCNC: 16 MMOL/L (ref 3–16)
BASOPHILS ABSOLUTE: 0.1 K/UL (ref 0–0.2)
BASOPHILS RELATIVE PERCENT: 0.7 %
BUN BLDV-MCNC: 12 MG/DL (ref 7–20)
CALCIUM SERPL-MCNC: 9.7 MG/DL (ref 8.3–10.6)
CHLORIDE BLD-SCNC: 93 MMOL/L (ref 99–110)
CHOLESTEROL, TOTAL: 160 MG/DL (ref 0–199)
CO2: 27 MMOL/L (ref 21–32)
CREAT SERPL-MCNC: 0.9 MG/DL (ref 0.6–1.1)
EOSINOPHILS ABSOLUTE: 0.2 K/UL (ref 0–0.6)
EOSINOPHILS RELATIVE PERCENT: 1.4 %
GFR SERPL CREATININE-BSD FRML MDRD: >60 ML/MIN/{1.73_M2}
GLUCOSE BLD-MCNC: 390 MG/DL (ref 70–99)
HCT VFR BLD CALC: 39.4 % (ref 36–48)
HDLC SERPL-MCNC: 33 MG/DL (ref 40–60)
HEMOGLOBIN: 12.6 G/DL (ref 12–16)
LDL CHOLESTEROL CALCULATED: ABNORMAL MG/DL
LDL CHOLESTEROL DIRECT: 74 MG/DL
LYMPHOCYTES ABSOLUTE: 2.9 K/UL (ref 1–5.1)
LYMPHOCYTES RELATIVE PERCENT: 24.6 %
MCH RBC QN AUTO: 26.7 PG (ref 26–34)
MCHC RBC AUTO-ENTMCNC: 32 G/DL (ref 31–36)
MCV RBC AUTO: 83.4 FL (ref 80–100)
MONOCYTES ABSOLUTE: 0.5 K/UL (ref 0–1.3)
MONOCYTES RELATIVE PERCENT: 4.2 %
NEUTROPHILS ABSOLUTE: 8.2 K/UL (ref 1.7–7.7)
NEUTROPHILS RELATIVE PERCENT: 69.1 %
PDW BLD-RTO: 15.2 % (ref 12.4–15.4)
PLATELET # BLD: 282 K/UL (ref 135–450)
PMV BLD AUTO: 8.3 FL (ref 5–10.5)
POTASSIUM SERPL-SCNC: 4.2 MMOL/L (ref 3.5–5.1)
RBC # BLD: 4.72 M/UL (ref 4–5.2)
SODIUM BLD-SCNC: 136 MMOL/L (ref 136–145)
TRIGL SERPL-MCNC: 415 MG/DL (ref 0–150)
TSH SERPL DL<=0.05 MIU/L-ACNC: 1.74 UIU/ML (ref 0.27–4.2)
VITAMIN D 25-HYDROXY: 21.9 NG/ML
VLDLC SERPL CALC-MCNC: ABNORMAL MG/DL
WBC # BLD: 11.9 K/UL (ref 4–11)

## 2023-02-03 NOTE — TELEPHONE ENCOUNTER
Patient I sin need of diagnostic bilateral mammogram order and Right breast ultrasound due to pain in her breasts. She was unable to get annual mammogram done today after she mentioned this issue to her technician. Patient would like to get this done at [facility]. Please advise.

## 2023-02-08 ENCOUNTER — OFFICE VISIT (OUTPATIENT)
Dept: FAMILY MEDICINE CLINIC | Age: 43
End: 2023-02-08
Payer: MEDICARE

## 2023-02-08 VITALS
BODY MASS INDEX: 47.14 KG/M2 | TEMPERATURE: 97.6 F | DIASTOLIC BLOOD PRESSURE: 69 MMHG | HEIGHT: 61 IN | HEART RATE: 101 BPM | SYSTOLIC BLOOD PRESSURE: 101 MMHG | RESPIRATION RATE: 16 BRPM | WEIGHT: 249.7 LBS | OXYGEN SATURATION: 96 %

## 2023-02-08 DIAGNOSIS — E11.65 TYPE 2 DIABETES MELLITUS WITH HYPERGLYCEMIA, WITH LONG-TERM CURRENT USE OF INSULIN (HCC): Primary | ICD-10-CM

## 2023-02-08 DIAGNOSIS — Z79.4 TYPE 2 DIABETES MELLITUS WITH HYPERGLYCEMIA, WITH LONG-TERM CURRENT USE OF INSULIN (HCC): Primary | ICD-10-CM

## 2023-02-08 DIAGNOSIS — E11.65 UNCONTROLLED TYPE 2 DIABETES MELLITUS WITH HYPERGLYCEMIA (HCC): ICD-10-CM

## 2023-02-08 DIAGNOSIS — R10.11 ABDOMINAL PAIN, ACUTE, RIGHT UPPER QUADRANT: ICD-10-CM

## 2023-02-08 DIAGNOSIS — D72.829 LEUKOCYTOSIS, UNSPECIFIED TYPE: ICD-10-CM

## 2023-02-08 LAB
BILIRUBIN, POC: NEGATIVE
BLOOD URINE, POC: NEGATIVE
CLARITY, POC: CLEAR
COLOR, POC: ABNORMAL
GLUCOSE URINE, POC: ABNORMAL
HBA1C MFR BLD: 10 %
KETONES, POC: NEGATIVE
LEUKOCYTE EST, POC: NEGATIVE
NITRITE, POC: NEGATIVE
PH, POC: 5.2
PROTEIN, POC: NEGATIVE
SPECIFIC GRAVITY, POC: <=1.005
UROBILINOGEN, POC: ABNORMAL

## 2023-02-08 PROCEDURE — 3046F HEMOGLOBIN A1C LEVEL >9.0%: CPT | Performed by: FAMILY MEDICINE

## 2023-02-08 PROCEDURE — 3078F DIAST BP <80 MM HG: CPT | Performed by: FAMILY MEDICINE

## 2023-02-08 PROCEDURE — G8417 CALC BMI ABV UP PARAM F/U: HCPCS | Performed by: FAMILY MEDICINE

## 2023-02-08 PROCEDURE — G8427 DOCREV CUR MEDS BY ELIG CLIN: HCPCS | Performed by: FAMILY MEDICINE

## 2023-02-08 PROCEDURE — 1036F TOBACCO NON-USER: CPT | Performed by: FAMILY MEDICINE

## 2023-02-08 PROCEDURE — 99214 OFFICE O/P EST MOD 30 MIN: CPT | Performed by: FAMILY MEDICINE

## 2023-02-08 PROCEDURE — 2022F DILAT RTA XM EVC RTNOPTHY: CPT | Performed by: FAMILY MEDICINE

## 2023-02-08 PROCEDURE — 3074F SYST BP LT 130 MM HG: CPT | Performed by: FAMILY MEDICINE

## 2023-02-08 PROCEDURE — G8482 FLU IMMUNIZE ORDER/ADMIN: HCPCS | Performed by: FAMILY MEDICINE

## 2023-02-08 PROCEDURE — 81002 URINALYSIS NONAUTO W/O SCOPE: CPT | Performed by: FAMILY MEDICINE

## 2023-02-08 RX ORDER — HYDROXYZINE PAMOATE 50 MG/1
1 CAPSULE ORAL 3 TIMES DAILY PRN
COMMUNITY
Start: 2023-01-30

## 2023-02-08 RX ORDER — AMOXICILLIN 250 MG
2 CAPSULE ORAL DAILY
COMMUNITY

## 2023-02-08 RX ORDER — INSULIN LISPRO 100 [IU]/ML
INJECTION, SOLUTION INTRAVENOUS; SUBCUTANEOUS
Qty: 15 ML | OUTPATIENT
Start: 2023-02-08

## 2023-02-08 RX ORDER — TRAZODONE HYDROCHLORIDE 50 MG/1
50 TABLET ORAL NIGHTLY
COMMUNITY
Start: 2023-01-30

## 2023-02-08 RX ORDER — ATORVASTATIN CALCIUM 40 MG/1
40 TABLET, FILM COATED ORAL DAILY
Qty: 90 TABLET | Refills: 3 | Status: SHIPPED | OUTPATIENT
Start: 2023-02-08

## 2023-02-08 RX ORDER — MELATONIN
1000 DAILY
Qty: 90 TABLET | Refills: 1 | Status: SHIPPED | OUTPATIENT
Start: 2023-02-08

## 2023-02-08 RX ORDER — DICLOFENAC SODIUM 75 MG/1
1 TABLET, DELAYED RELEASE ORAL DAILY
COMMUNITY
Start: 2023-01-30

## 2023-02-08 SDOH — ECONOMIC STABILITY: FOOD INSECURITY: WITHIN THE PAST 12 MONTHS, YOU WORRIED THAT YOUR FOOD WOULD RUN OUT BEFORE YOU GOT MONEY TO BUY MORE.: NEVER TRUE

## 2023-02-08 SDOH — ECONOMIC STABILITY: HOUSING INSECURITY
IN THE LAST 12 MONTHS, WAS THERE A TIME WHEN YOU DID NOT HAVE A STEADY PLACE TO SLEEP OR SLEPT IN A SHELTER (INCLUDING NOW)?: NO

## 2023-02-08 SDOH — ECONOMIC STABILITY: INCOME INSECURITY: HOW HARD IS IT FOR YOU TO PAY FOR THE VERY BASICS LIKE FOOD, HOUSING, MEDICAL CARE, AND HEATING?: NOT HARD AT ALL

## 2023-02-08 SDOH — ECONOMIC STABILITY: FOOD INSECURITY: WITHIN THE PAST 12 MONTHS, THE FOOD YOU BOUGHT JUST DIDN'T LAST AND YOU DIDN'T HAVE MONEY TO GET MORE.: NEVER TRUE

## 2023-02-08 NOTE — PROGRESS NOTES
T Chief Complaint: Diabetes (Type 2 Diabetic F/U; last A1C= 11.2% on 1/17/23 ), Discuss Labs (discuss 1/17/2023 lab results w/provider ), Abdominal Pain (right upper quadrant abdominal pain (sharp, throbbing), intermittently x5 days ), and Follow-Up from [address] [facility] (01/19/2023 - 01/20/2023  [facility] for overdose, intentional self-harm, initial (discharged to [address] [place] [address] x1 week) Patient reports that she took her normal dose of 20 mg/12.5 mg of lisinopril/hydrochlorothiazide to attempt suicide )       HPI:  Nathen Keyes is a 43 y.o. female here with c/o with chief complaints of right upper quadrant pain. Denies any nausea vomiting. The pain started since 5 days and its been intermittent. Denies any problems with her bowel movements. She has a history of type 2 diabetes. She has restarted all her medications and she has been taking Lantus 55 units in the morning 45 units in the evening Humalog 3 times a day - 5 units and Januvia 100 mg daily Jardiance 25 mg daily. Her BGM currently running in the 200-300. Her lipid profile is abnormal currently she is taking simvastatin 5 mg daily    Her white cell count was elevated. Has been having elevated white cell count for a while. Had seen oncologist in the past and she is not sure what happened. Denies any UTI symptoms. She also was noted to have low vitamin D      ROS:  Constitutional: Negative   HENT: Negative   Respiratory: Negative for cough, chest tightness, shortness of breath and wheezing. Cardiovascular: Negative   Gastrointestinal: As mentioned above  Genitourinary: Negative   Musculoskeletal: Negative for gait problem, joint swelling and myalgias. Skin: Negative for color change, pallor, rash and wound.    Neurological: Negative  Psychiatric/Behavioral: Has anxiety and depression    Patient's problem list, medications, allergies, past medical, surgical, social and family histories were reviewed and updated as appropriate.      Current Outpatient Medications   Medication Sig Dispense Refill    traZODone (DESYREL) 50 MG tablet Take 50 mg by mouth at bedtime      senna-docusate (PERICOLACE) 8.6-50 MG per tablet Take 2 tablets by mouth daily      hydrOXYzine pamoate (VISTARIL) 50 MG capsule Take 1 capsule by mouth 3 times daily as needed      diclofenac (VOLTAREN) 75 MG EC tablet Take 1 tablet by mouth daily      atorvastatin (LIPITOR) 40 MG tablet Take 1 tablet by mouth daily 90 tablet 3    vitamin D3 (CHOLECALCIFEROL) 25 MCG (1000 UT) TABS tablet Take 1 tablet by mouth daily 90 tablet 1    ibuprofen (ADVIL;MOTRIN) 800 MG tablet Take 1 tablet by mouth every 6-8 hours as needed      busPIRone (BUSPAR) 10 MG tablet Take 1 tablet by mouth in the morning and at bedtime      promethazine (PHENERGAN) 25 MG tablet Take 1 tablet by mouth every 4-6 hours as needed      LATUDA 60 MG TABS tablet Take 60 mg by mouth at bedtime      Rimegepant Sulfate (NURTEC PO) Take by mouth      insulin lispro, 1 Unit Dial, (HUMALOG KWIKPEN) 100 UNIT/ML SOPN Inject 13 Units into the skin 3 times daily (before meals) Hold if glucose less than 110 5 Adjustable Dose Pre-filled Pen Syringe 2    insulin glargine (LANTUS SOLOSTAR) 100 UNIT/ML injection pen 55 units in the morning and 45 units in the evening 72 mL 2    SITagliptin (JANUVIA) 100 MG tablet Take 1 tablet by mouth daily 90 tablet 1    lisinopril-hydroCHLOROthiazide (PRINZIDE;ZESTORETIC) 20-12.5 MG per tablet Take 1 tablet by mouth daily 90 tablet 1    Insulin Pen Needle 31G X 5 MM MISC 1 each by Does not apply route daily 100 each 3    empagliflozin (JARDIANCE) 25 MG tablet Take 1 tablet by mouth daily 30 tablet 3    albuterol sulfate HFA (PROVENTIL;VENTOLIN;PROAIR) 108 (90 Base) MCG/ACT inhaler Inhale 2 puffs into the lungs every 6 hours as needed for Wheezing or Shortness of Breath 18 g 3    ondansetron (ZOFRAN ODT) 4 MG disintegrating tablet Take 1 tablet by mouth every 8 hours as needed for Nausea 20 tablet 0    CVS Lancets Thin 26G MISC TEST 3 TIMES A DAY AND AS NEEDED FOR SYMPTOMS OF IRREGULAR BLOOD GLUCOSE 300 each 3    blood glucose monitor strips Test three times daily and prn 100 strip 5    pantoprazole (PROTONIX) 40 MG tablet Take 1 tablet by mouth every morning (before breakfast) 30 tablet 5     No current facility-administered medications for this visit. Social History     Tobacco Use    Smoking status: Never    Smokeless tobacco: Never    Tobacco comments:     around 2nd hand smoke    Substance Use Topics    Alcohol use: No        Objective:     Vitals:    02/08/23 1144   BP: 101/69   Pulse: (!) 101   Resp: 16   Temp: 97.6 °F (36.4 °C)   TempSrc: Temporal   SpO2: 96%   Weight: 249 lb 11.2 oz (113.3 kg)   Height: 5' 1\" (1.549 m)     Body mass index is 47.18 kg/m². Wt Readings from Last 3 Encounters:   02/08/23 249 lb 11.2 oz (113.3 kg)   01/17/23 246 lb 6.4 oz (111.8 kg)   01/11/23 244 lb 0.8 oz (110.7 kg)     BP Readings from Last 3 Encounters:   02/08/23 101/69   01/17/23 102/72   11/21/22 110/80       Physical exam:  Constitutional: she is oriented to person, place, and time. she appears well-developed and well-nourished. No distress. Neck: Normal range of motion. No JVD present. No tracheal deviation present. No thyromegaly present. Cardiovascular: Normal rate, regular rhythm, normal heart sounds and intact distal pulses. No murmur heard. Pulmonary/Chest: Effort normal and breath sounds normal. No stridor. No respiratory distress. she has no wheezes. she has no rales. sheexhibits no tenderness. Abdominal: Soft. Bowel sounds are normal.  She has tenderness in the right upper quadrant but no guarding or rigidity  Skin: Skin is warm and dry. No rash noted. she is not diaphoretic. No erythema. No pallor. Psychiatric: she has a normal mood and affect. her   behavior is normal.      Assessment/Plan:   1.  Type 2 diabetes mellitus with hyperglycemia, with long-term current use of insulin (HCC)  BGM improving  Advised to continue the  Given handwritten instructions the sliding scale for lispro  - HM DIABETES FOOT EXAM  - POCT glycosylated hemoglobin (Hb A1C)    2. Leukocytosis, unspecified type  - AFL - Dalila Heck MD, Hematology Oncology, [facility]    3. Abdominal pain, acute, right upper quadrant  Could be due to gallstones  Advised to get  - POCT Urinalysis no Micro  - US GALLBLADDER RUQ; Future  - Comprehensive Metabolic Panel; Future    4.   Hyperlipidemia  Change simvastatin to Lipitor 40 mg daily     Follow-up in 2 months      Adriano Juarez MD  2/8/2023 1:08 PM

## 2023-02-09 ENCOUNTER — PATIENT MESSAGE (OUTPATIENT)
Dept: FAMILY MEDICINE CLINIC | Age: 43
End: 2023-02-09

## 2023-02-09 DIAGNOSIS — E11.65 UNCONTROLLED TYPE 2 DIABETES MELLITUS WITH HYPERGLYCEMIA (HCC): Primary | ICD-10-CM

## 2023-02-09 NOTE — TELEPHONE ENCOUNTER
The following referral(s) have been placed, as requested:    External Referral To Endocrinology ([facility])         I attached a copy of the order requisition to [employer]. No further action is needed at this time; thank you!

## 2023-02-09 NOTE — TELEPHONE ENCOUNTER
From: Jennie Kaur  To: Dr. Tran Manual: 2/9/2023 2:57 PM EST  Subject: Non urgert    Can I get s referral for a [facility] doctor for diabetes thru [facility]

## 2023-02-10 ENCOUNTER — HOSPITAL ENCOUNTER (OUTPATIENT)
Age: 43
Discharge: HOME | End: 2023-02-10
Payer: MEDICARE

## 2023-02-10 DIAGNOSIS — R10.11 ABDOMINAL PAIN, ACUTE, RIGHT UPPER QUADRANT: ICD-10-CM

## 2023-02-10 LAB
A/G RATIO: 1.1 (ref 1.1–2.2)
ALBUMIN SERPL-MCNC: 4.1 G/DL (ref 3.4–5)
ALP BLD-CCNC: 141 U/L (ref 40–129)
ALT SERPL-CCNC: 37 U/L (ref 10–40)
ANION GAP SERPL CALCULATED.3IONS-SCNC: 11 MMOL/L (ref 3–16)
AST SERPL-CCNC: 27 U/L (ref 15–37)
BILIRUB SERPL-MCNC: 0.3 MG/DL (ref 0–1)
BUN BLDV-MCNC: 11 MG/DL (ref 7–20)
CALCIUM SERPL-MCNC: 10.2 MG/DL (ref 8.3–10.6)
CHLORIDE BLD-SCNC: 90 MMOL/L (ref 99–110)
CO2: 28 MMOL/L (ref 21–32)
CREAT SERPL-MCNC: 0.9 MG/DL (ref 0.6–1.1)
GFR SERPL CREATININE-BSD FRML MDRD: >60 ML/MIN/{1.73_M2}
GLUCOSE BLD-MCNC: 347 MG/DL (ref 70–99)
POTASSIUM SERPL-SCNC: 4.5 MMOL/L (ref 3.5–5.1)
SODIUM BLD-SCNC: 129 MMOL/L (ref 136–145)
TOTAL PROTEIN: 7.9 G/DL (ref 6.4–8.2)

## 2023-02-10 PROCEDURE — 36415 COLL VENOUS BLD VENIPUNCTURE: CPT

## 2023-02-10 PROCEDURE — 80053 COMPREHEN METABOLIC PANEL: CPT

## 2023-02-14 ENCOUNTER — TELEPHONE (OUTPATIENT)
Dept: FAMILY MEDICINE CLINIC | Age: 43
End: 2023-02-14

## 2023-02-14 DIAGNOSIS — H65.93 FLUID LEVEL BEHIND TYMPANIC MEMBRANE OF BOTH EARS: ICD-10-CM

## 2023-02-14 RX ORDER — FLUTICASONE PROPIONATE 50 MCG
SPRAY, SUSPENSION (ML) NASAL
Qty: 48 G | OUTPATIENT
Start: 2023-02-14

## 2023-02-14 NOTE — TELEPHONE ENCOUNTER
I spoke with patient regarding her SSI paperwork. Dr. Kaylene Collet advised psych to fill out forms. I faxed blank forms to [address] [place] [address] Psychiatry to be completed. No further action is needed at this time; thank you!

## 2023-02-14 NOTE — PROGRESS NOTES
Called patient about procedure. Told to be here at 0830 for procedure at 1000. Must be NPO after midnight but can take morning medication with sips of water. Patient stated they do not take blood thinners. Told to have a responsible adult with them to take them home and stay with them afterwards, if they do not get admitted to hospital. Also, to bring a current list of medications. No other questions or concerns.

## 2023-02-15 ENCOUNTER — HOSPITAL ENCOUNTER (OUTPATIENT)
Dept: ULTRASOUND IMAGING | Age: 43
Discharge: HOME | End: 2023-02-15
Payer: MEDICARE

## 2023-02-15 ENCOUNTER — HOSPITAL ENCOUNTER (OUTPATIENT)
Dept: INTERVENTIONAL RADIOLOGY/VASCULAR | Age: 43
Discharge: HOME | End: 2023-02-15
Payer: MEDICARE

## 2023-02-15 DIAGNOSIS — B37.31 VAGINAL CANDIDIASIS: ICD-10-CM

## 2023-02-15 DIAGNOSIS — M47.817 LUMBOSACRAL SPONDYLOSIS WITHOUT MYELOPATHY: ICD-10-CM

## 2023-02-15 DIAGNOSIS — M47.816 LUMBAR FACET ARTHROPATHY: ICD-10-CM

## 2023-02-15 DIAGNOSIS — R10.11 ABDOMINAL PAIN, ACUTE, RIGHT UPPER QUADRANT: ICD-10-CM

## 2023-02-15 PROCEDURE — 2709999900 HC NON-CHARGEABLE SUPPLY

## 2023-02-15 PROCEDURE — 76705 ECHO EXAM OF ABDOMEN: CPT

## 2023-02-15 PROCEDURE — 64494 INJ PARAVERT F JNT L/S 2 LEV: CPT

## 2023-02-15 PROCEDURE — 64493 INJ PARAVERT F JNT L/S 1 LEV: CPT

## 2023-02-15 PROCEDURE — 2500000003 HC RX 250 WO HCPCS

## 2023-02-15 PROCEDURE — 64495 INJ PARAVERT F JNT L/S 3 LEV: CPT

## 2023-02-15 RX ORDER — FLUCONAZOLE 150 MG/1
150 TABLET ORAL ONCE
Qty: 1 TABLET | Refills: 0 | Status: SHIPPED | OUTPATIENT
Start: 2023-02-15 | End: 2023-02-15

## 2023-02-15 RX ORDER — HYDROCODONE BITARTRATE AND ACETAMINOPHEN 5; 325 MG/1; MG/1
1 TABLET ORAL EVERY 8 HOURS PRN
Qty: 15 TABLET | Refills: 0 | Status: SHIPPED | OUTPATIENT
Start: 2023-02-15 | End: 2023-02-20

## 2023-02-15 NOTE — TELEPHONE ENCOUNTER
----- Message from [facility] sent at 2/14/2023  3:07 PM EST -----  Subject: Message to Provider    QUESTIONS  Information for Provider? Patient called in to request her provider to   send her a prescription to [address], [place],   [address], phone number is [phone]. Patient stated that she is itching   down below and thinks she may have a yeast infection. Please contact   patient to further assist.   ---------------------------------------------------------------------------  --------------  Chandni HORN  [phone]; OK to leave message on voicemail  ---------------------------------------------------------------------------  --------------  SCRIPT ANSWERS  Relationship to Patient?  Self

## 2023-02-15 NOTE — DISCHARGE INSTRUCTIONS
Medial Branch Block  Patient Discharge Instructions      When [facility] should not drive the day of the procedure. You may experience leg weakness during the first 24 hours following the procedure. To prevent yourself from falling, it is important to have someone help you walk. However, you do not need to stay in bed when you get home. In fact, it is best to walk around if you feel up to it, but you will need assistance during the first 24 hours following the epidural steroid injection. Even if you feel better right away, avoid activities that may strain your back. Keep in mind that most patients feel increased pain for the first 24 hours. You should start feeling some pain relief 2-3 days following the injection. This is because the steroid will start working within three days of the injection with maximal effect by one week. At that time, we will evaluate your pain level to determine the need for another steroid injection. Remove bandaid(s) within 24 hours. When to Call Your Doctor    Call right away if you notice any of the following symptoms:    Severe pain or headache;  Fever or chills; Redness or swelling around the injection site. Loss of bladder or bowel control. You may contact [address] [place] [address] [facility] for any questions or problems that may occur at [phone] during the hours of 9am-5pm Monday-Friday, or the hospital  after hours at [phone], to have the interventional radiologist on call paged. [facility]  Cardiovascular Special Procedures  General Discharge Instructions    PROCEDURE: ______Medial Branch Block_______________    __x__ Do not operate vehicles for the rest of the day. Extremities may have weakness and/or numbness.   __x__ We strongly suggest that a responsible adult be with you for the next 24 hours for your protection and safety  ____ If the intravenous catheter site is painful, apply warm wet compresses on the site until the soreness is relieved and elevate the arm above the heart. Call your physician if no improvement in 2 to 3 days    DIETARY INSTRUCTIONS:    ____ Drink extra fluids over the next 24 hours (If not contraindicated by illness or by physician order)  ____ Start with clear liquids and progress to normal diet as you feel like eating. If you experience nausea or repeated episodes of vomiting, which persist beyond 12-24 hours, notify your doctor        __x__ Resume your previous diet    ACTIVITY INSTRUCTIONS:    ____ See other instructions  ____ No special instructions  ____ Rest for 24 hours    __x__ Up as tolerated  __x__ Increase activity as tolerated    Wound/Dressing Instructions:  ____ See other instructions  ____ May shower, tomorrow  __x__ Remove bandage within 24 hours        FOLLOW-UP APPOINTMENT    Follow up with MD as directed. Belongings returned to patient and/or family: Yes. The Discharge Instructions have been explained to me. I understand and can verbalize these instructions.

## 2023-02-16 ENCOUNTER — TELEPHONE (OUTPATIENT)
Dept: FAMILY MEDICINE CLINIC | Age: 43
End: 2023-02-16

## 2023-02-16 ENCOUNTER — PATIENT MESSAGE (OUTPATIENT)
Dept: FAMILY MEDICINE CLINIC | Age: 43
End: 2023-02-16

## 2023-02-16 DIAGNOSIS — N64.4 PAIN OF BOTH BREASTS: Primary | ICD-10-CM

## 2023-02-16 DIAGNOSIS — M54.9 UPPER BACK PAIN: ICD-10-CM

## 2023-02-16 NOTE — TELEPHONE ENCOUNTER
LMOM with [facility] Radiology Dept. regarding these results (2nd attempt to reach). US ABDOMEN LIMITED results signed by Nevaeh Corbin MD on 2/15/2023 11:55 AM  the results looks as if it was Medial Branch Nerves Block performed instead. Asking for clarification on why this appears to be results in the wrong order & did the patient get the US Abdomen? The [facility] Ultrasound  [phone]  [address] [place] [address] Road.   [address] [place] [address]

## 2023-02-16 NOTE — TELEPHONE ENCOUNTER
Yes it was ordered by me and please see the results and does not look like the abdominal ultrasound which I was asking for

## 2023-02-16 NOTE — TELEPHONE ENCOUNTER
A referral has been placed. Pt advised to contact their office to schedule an appointment. No further action is needed at this time; thank you!

## 2023-02-16 NOTE — TELEPHONE ENCOUNTER
From: Shavonne Potts  To: Dr. Timo Martinezt: 2/16/2023 9:39 AM EST  Subject: Non urgert    I have a lot of back problems upper and lower back can you referral me to Dr Heller Setting a breast reduction I have had one back in 2007 but think I need to get another one done

## 2023-02-16 NOTE — TELEPHONE ENCOUNTER
----- Message from Les Short sent at 2/16/2023  9:01 AM EST -----  Subject: Results Request    QUESTIONS  Results: ultrasound; Ordered by: Isabelle Boast   Date Performed: 2023-02-15  ---------------------------------------------------------------------------  --------------  Isaias Galeano DOBX    [phone]; OK to leave message on voicemail  ---------------------------------------------------------------------------  --------------

## 2023-02-24 ENCOUNTER — HOSPITAL ENCOUNTER (OUTPATIENT)
Dept: ULTRASOUND IMAGING | Age: 43
Discharge: HOME | End: 2023-02-24
Payer: MEDICARE

## 2023-02-24 ENCOUNTER — TELEPHONE (OUTPATIENT)
Dept: FAMILY MEDICINE CLINIC | Age: 43
End: 2023-02-24

## 2023-02-24 DIAGNOSIS — N83.209 CYST OF OVARY, UNSPECIFIED LATERALITY: ICD-10-CM

## 2023-02-24 PROCEDURE — 76830 TRANSVAGINAL US NON-OB: CPT

## 2023-02-24 NOTE — TELEPHONE ENCOUNTER
Patient called requesting results for her [address] [place] [address]. She's requesting her pcp review the results and that clinical staff give her a call back.

## 2023-02-27 ENCOUNTER — TELEPHONE (OUTPATIENT)
Dept: ORTHOPEDIC SURGERY | Age: 43
End: 2023-02-27

## 2023-02-27 NOTE — TELEPHONE ENCOUNTER
Surgery and/or Procedure Scheduling     Contact Name: Devante Patel Request: [facility] right carpal tunnel release     Patient Contact Number: [phone]     Patient calling to re-schedule:[facility]pal [facility] release

## 2023-02-27 NOTE — TELEPHONE ENCOUNTER
Spoke with patient. Insurance requires 5-7 days for [employer]. I suggested we schedule for next Thursday 3-9-23 @ 7:15 AM in [facility]. Patient is agreeable and understands a follow up call will be made to her tomorrow to go over all information.

## 2023-02-28 ENCOUNTER — TELEPHONE (OUTPATIENT)
Dept: ORTHOPEDIC SURGERY | Age: 43
End: 2023-02-28

## 2023-02-28 NOTE — TELEPHONE ENCOUNTER
Tenzin Worrell [id number]    Date: 03/09/23 thru 06/07/23  Type of SX:  Outpatient  Location: [facility]  CPT: 29952   DX Code: G56.01  SX area: Rt hand/wrist  Insurance: [employer]

## 2023-02-28 NOTE — TELEPHONE ENCOUNTER
General Question     Subject: PRE OP APPT  Patient and /or Facility Request: Amadou Escobar  Contact Number: [phone]    PATIENT CALLED IN TO SEE IF SHE HAD ANOTHER APPT FOR HER R HAND BEFORE HER SX ON 3/9/2023. .. PLEASE CALL PATIENT BACK AT THE ABOVE NUMBER. ..

## 2023-03-14 DIAGNOSIS — H65.93 FLUID LEVEL BEHIND TYMPANIC MEMBRANE OF BOTH EARS: ICD-10-CM

## 2023-03-14 RX ORDER — FLUTICASONE PROPIONATE 50 MCG
SPRAY, SUSPENSION (ML) NASAL
Qty: 16 G | Refills: 1 | Status: SHIPPED | OUTPATIENT
Start: 2023-03-14

## 2023-03-14 NOTE — TELEPHONE ENCOUNTER
Medication:   Requested Prescriptions     Pending Prescriptions Disp Refills    fluticasone (FLONASE) 50 MCG/ACT nasal spray [Pharmacy Med Name: FLUTICASONE 50MCG NASAL SP (120) RX] 16 g 1     Sig: INSERT 1 SPRAY INTO EACH NOSTRIL DAILY AS NEEDED. AVOID USING FOR MORE THAN 1 WEEK        Last Filled:      Patient Phone Number: [phone] (home)     Last appt: 11/21/2022   Next appt: Visit date not found    Last OARRS:   RX Monitoring 9/13/2019   Attestation -   Periodic Controlled Substance Monitoring Possible medication side effects, risk of tolerance/dependence & alternative treatments discussed. ;No signs of potential drug abuse or diversion identified. Chronic Pain > 80 MEDD -       Preferred Pharmacy:   [facility] [address]  [address] [facility] [address]  Phone: [phone] Fax: [phone] [facility] [address] [place] [address]  Phone: [phone] Fax: [phone]    [address]64 [address] [facility] [phone]  [facility] [address] [facility] [address]  Phone: [phone] Fax: [phone]  Medication:   Requested Prescriptions     Pending Prescriptions Disp Refills    fluticasone (FLONASE) 50 MCG/ACT nasal spray [Pharmacy Med Name: FLUTICASONE 50MCG NASAL SP (120) RX] 16 g 1     Sig: INSERT 1 SPRAY INTO EACH NOSTRIL DAILY AS NEEDED. AVOID USING FOR MORE THAN 1 WEEK        Last Filled:      Patient Phone Number: [phone] (home)     Last appt: 11/21/2022   Next appt: Visit date not found    Last OARRS:   RX Monitoring 9/13/2019   Attestation -   Periodic Controlled Substance Monitoring Possible medication side effects, risk of tolerance/dependence & alternative treatments discussed. ;No signs of potential drug abuse or diversion identified.    Chronic Pain > 80 MEDD -       Preferred Pharmacy:   [facility]/pharmacy #[id number]- [id number] [facility] [address]  [address]  Phone: [phone] Fax: [phone] GARG [facility] [address]  [address] [place] [address]  Phone: [phone] Fax: [phone] [place] [address] [phone]  Brown [place] [facility] [address]  Phone: [phone] Fax: [phone]  Medication:   Requested Prescriptions     Pending Prescriptions Disp Refills    fluticasone (FLONASE) 50 MCG/ACT nasal spray [Pharmacy Med Name: FLUTICASONE 50MCG NASAL SP (120) RX] 16 g 1     Sig: INSERT 1 SPRAY INTO EACH NOSTRIL DAILY AS NEEDED. AVOID USING FOR MORE THAN 1 WEEK        Last Filled:      Patient Phone Number: [phone] (home)     Last appt: 11/21/2022   Next appt: Visit date not found    Last OARRS:   RX Monitoring 9/13/2019   Attestation -   Periodic Controlled Substance Monitoring Possible medication side effects, risk of tolerance/dependence & alternative treatments discussed. ;No signs of potential drug abuse or diversion identified.    Chronic Pain > 80 MEDD -       Preferred Pharmacy:   [facility]/pharmacy #[id number]- [address] [facility] [address]  [address]  Phone: [phone] Fax: [phone] GARG Melissa [facility] 600 [facility] [phone]  [address] [place] [address]  Phone: [phone] Fax: [phone] [place] [address], 3400 [facility] 686 [address] [phone]  [place] Gavin[address] [place] [address]  Phone: [phone] Fax: [phone]  Medication:   Requested Prescriptions     Pending Prescriptions Disp Refills    fluticasone (FLONASE) 50 MCG/ACT nasal spray [Pharmacy Med Name: FLUTICASONE 50MCG NASAL SP (120) RX] 16 g 1 Sig: INSERT 1 SPRAY INTO EACH NOSTRIL DAILY AS NEEDED. AVOID USING FOR MORE THAN 1 WEEK        Last Filled:      Patient Phone Number: [phone] (home)     Last appt: 11/21/2022   Next appt: Visit date not found    Last OARRS:   RX Monitoring 9/13/2019   Attestation -   Periodic Controlled Substance Monitoring Possible medication side effects, risk of tolerance/dependence & alternative treatments discussed. ;No signs of potential drug abuse or diversion identified.    Chronic Pain > 80 MEDD -       Preferred Pharmacy:   [id number]  [address] [place] [address] [address] [address]  Phone: [phone] Fax: [phone] [facility] 1025 [address] [place], [place] [address] [address]  Phone: [phone] Fax: [address] [place] [address] Lopez [phone]  Brown [place] 1232 [address] [facility] [address]  Phone: [phone] Fax: [phone]

## 2023-03-18 RX ORDER — SIMVASTATIN 5 MG
TABLET ORAL
Qty: 90 TABLET | OUTPATIENT
Start: 2023-03-18

## 2023-03-20 ENCOUNTER — TELEPHONE (OUTPATIENT)
Dept: ORTHOPEDIC SURGERY | Age: 43
End: 2023-03-20

## 2023-03-20 NOTE — TELEPHONE ENCOUNTER
I left a detailed message for patient. Appt tomorrow has been scheduled for PO visit, but surgery did not happen. Need patient to call back and confirm that we can cancel, or adjust visit information if she is keeping it and following up on the condition again. Advised patient this will count as a no show if we do not hear from her in advance of the appointment.

## 2023-03-22 NOTE — TELEPHONE ENCOUNTER
Called pt left message to have pt call office so she can be advised of this information. SG Cellcept Pregnancy And Lactation Text: This medication is Pregnancy Category D and isn't considered safe during pregnancy. It is unknown if this medication is excreted in breast milk.

## 2023-04-26 ENCOUNTER — PATIENT MESSAGE (OUTPATIENT)
Dept: FAMILY MEDICINE CLINIC | Age: 43
End: 2023-04-26

## 2023-04-26 ENCOUNTER — OFFICE VISIT (OUTPATIENT)
Dept: FAMILY MEDICINE CLINIC | Age: 43
End: 2023-04-26
Payer: MEDICARE

## 2023-04-26 VITALS
SYSTOLIC BLOOD PRESSURE: 124 MMHG | HEART RATE: 94 BPM | OXYGEN SATURATION: 97 % | WEIGHT: 242 LBS | BODY MASS INDEX: 45.73 KG/M2 | DIASTOLIC BLOOD PRESSURE: 86 MMHG

## 2023-04-26 DIAGNOSIS — Z76.89 ENCOUNTER TO ESTABLISH CARE: Primary | ICD-10-CM

## 2023-04-26 DIAGNOSIS — E83.42 HYPOMAGNESEMIA: ICD-10-CM

## 2023-04-26 DIAGNOSIS — B37.31 VAGINAL YEAST INFECTION: ICD-10-CM

## 2023-04-26 DIAGNOSIS — E11.65 UNCONTROLLED TYPE 2 DIABETES MELLITUS WITH HYPERGLYCEMIA (HCC): ICD-10-CM

## 2023-04-26 DIAGNOSIS — E78.2 MIXED HYPERLIPIDEMIA: ICD-10-CM

## 2023-04-26 DIAGNOSIS — I10 PRIMARY HYPERTENSION: ICD-10-CM

## 2023-04-26 DIAGNOSIS — N89.8 VAGINAL ITCHING: ICD-10-CM

## 2023-04-26 DIAGNOSIS — F31.76 BIPOLAR DISORDER, IN FULL REMISSION, MOST RECENT EPISODE DEPRESSED (HCC): ICD-10-CM

## 2023-04-26 DIAGNOSIS — E11.9 TYPE 2 DIABETES MELLITUS WITHOUT COMPLICATION, WITH LONG-TERM CURRENT USE OF INSULIN (HCC): ICD-10-CM

## 2023-04-26 DIAGNOSIS — Z79.4 TYPE 2 DIABETES MELLITUS WITHOUT COMPLICATION, WITH LONG-TERM CURRENT USE OF INSULIN (HCC): ICD-10-CM

## 2023-04-26 LAB
ALBUMIN SERPL-MCNC: 4.5 G/DL (ref 3.4–5)
ALBUMIN/GLOB SERPL: 1.3 {RATIO} (ref 1.1–2.2)
ALP SERPL-CCNC: 174 U/L (ref 40–129)
ALT SERPL-CCNC: 39 U/L (ref 10–40)
ANION GAP SERPL CALCULATED.3IONS-SCNC: 13 MMOL/L (ref 3–16)
AST SERPL-CCNC: 43 U/L (ref 15–37)
BILIRUB SERPL-MCNC: 0.3 MG/DL (ref 0–1)
BILIRUBIN, POC: NORMAL
BLOOD URINE, POC: NORMAL
BUN SERPL-MCNC: 10 MG/DL (ref 7–20)
CALCIUM SERPL-MCNC: 9.8 MG/DL (ref 8.3–10.6)
CHLORIDE SERPL-SCNC: 90 MMOL/L (ref 99–110)
CLARITY, POC: NORMAL
CO2 SERPL-SCNC: 30 MMOL/L (ref 21–32)
COLOR, POC: YELLOW
CREAT SERPL-MCNC: 0.8 MG/DL (ref 0.6–1.1)
GFR SERPLBLD CREATININE-BSD FMLA CKD-EPI: >60 ML/MIN/{1.73_M2}
GLUCOSE SERPL-MCNC: 315 MG/DL (ref 70–99)
GLUCOSE URINE, POC: 1000
KETONES, POC: NORMAL
LEUKOCYTE EST, POC: NORMAL
MAGNESIUM SERPL-MCNC: 1.7 MG/DL (ref 1.8–2.4)
NITRITE, POC: NORMAL
PH, POC: 7
POTASSIUM SERPL-SCNC: 4.5 MMOL/L (ref 3.5–5.1)
PROT SERPL-MCNC: 8 G/DL (ref 6.4–8.2)
PROTEIN, POC: NORMAL
SODIUM SERPL-SCNC: 133 MMOL/L (ref 136–145)
SPECIFIC GRAVITY, POC: 1.01
UROBILINOGEN, POC: 0.2

## 2023-04-26 PROCEDURE — G8427 DOCREV CUR MEDS BY ELIG CLIN: HCPCS

## 2023-04-26 PROCEDURE — 3079F DIAST BP 80-89 MM HG: CPT

## 2023-04-26 PROCEDURE — 36415 COLL VENOUS BLD VENIPUNCTURE: CPT

## 2023-04-26 PROCEDURE — 81002 URINALYSIS NONAUTO W/O SCOPE: CPT

## 2023-04-26 PROCEDURE — 2022F DILAT RTA XM EVC RTNOPTHY: CPT

## 2023-04-26 PROCEDURE — G8417 CALC BMI ABV UP PARAM F/U: HCPCS

## 2023-04-26 PROCEDURE — 99215 OFFICE O/P EST HI 40 MIN: CPT

## 2023-04-26 PROCEDURE — 1036F TOBACCO NON-USER: CPT

## 2023-04-26 PROCEDURE — 3046F HEMOGLOBIN A1C LEVEL >9.0%: CPT

## 2023-04-26 PROCEDURE — 3074F SYST BP LT 130 MM HG: CPT

## 2023-04-26 RX ORDER — FLUCONAZOLE 150 MG/1
150 TABLET ORAL
Qty: 2 TABLET | Refills: 0 | Status: SHIPPED | OUTPATIENT
Start: 2023-04-26 | End: 2023-05-02

## 2023-04-26 RX ORDER — INSULIN GLARGINE 100 [IU]/ML
INJECTION, SOLUTION SUBCUTANEOUS
Qty: 72 ML | Refills: 5 | Status: SHIPPED | OUTPATIENT
Start: 2023-04-26

## 2023-04-26 RX ORDER — INSULIN LISPRO 100 [IU]/ML
13 INJECTION, SOLUTION INTRAVENOUS; SUBCUTANEOUS
Qty: 5 ADJUSTABLE DOSE PRE-FILLED PEN SYRINGE | Refills: 2 | Status: SHIPPED | OUTPATIENT
Start: 2023-04-26

## 2023-04-26 ASSESSMENT — ENCOUNTER SYMPTOMS
RESPIRATORY NEGATIVE: 1
GASTROINTESTINAL NEGATIVE: 1
ROS SKIN COMMENTS: VAGINAL ITCHING

## 2023-04-26 NOTE — PROGRESS NOTES
New Patient      Carloz Orozco  YOB: 1980    Date of Service:  4/26/2023    Chief Complaint:   Carloz Orozco is a 37 y.o. female who presents for   Chief Complaint   Patient presents with    New Patient    Vaginal Itching        HPI: Patient seen in office today to establish care. Was previously seeing Bree Biggs. Last seen on 2/8/2023. Recently moved to this area on 4/10/23. Is currently living in homeless shelter in [place], [place]. Diabetes: Is supposed to be on Humalog sliding scale insulin and Lantus. Reports not taking at this time due to being out of medication since around 4/10/23. Wants to trial Mounjaro. Has DM meter to monitor. Hyperlipidemia: Taking Lipitor 40 mg daily    Pulmonary hypertension: Not following pulmonary. Has PRISCILLA and was on CPAP. Lost her prior machine and insurance won't pay for a new one for another year. Hypertension: /86. Taking lisinopril-hydrochlorothiazide 20-12.5 mg daily. Bipolar: Takes Vistaril 50 mg 3 times daily as needed, BuSpar 10 mg twice daily, Latuda 60 mg nightly, trazodone 50 mg nightly. Following [facility] for psych tx in the past. Now seeing [address] [place] [address]. Has kristan today with . Last saw cardio Blanca Powers on 11/11/12. Was advised to f/u around 5/11/24 for 18 month f/u. Was going to see for a weight loss procedure. Was required to see cardio. Hx vit d def. Vaginal itching and discharge x1 week. Not using any creams or vaginal ointments. Point-of-care urinalysis in office today showed glucose but no other abnormalities.     Advance Directive: N, <no information>    Immunization History   Administered Date(s) Administered    COVID-19, PFIZER Bivalent BOOSTER, DO NOT Dilute, (age 12y+), IM, 30 mcg/0.3 mL 09/07/2022    COVID-19, PFIZER PURPLE top, DILUTE for use, (age 15 y+), 30mcg/0.3mL 05/18/2021, 06/08/2021, 01/17/2022    Hep B, ENGERIX-B, (age 20y+), IM, 1mL 09/06/2019, 10/17/2019

## 2023-04-26 NOTE — TELEPHONE ENCOUNTER
From: Becky Memory  To: oDn Rater  Sent: 4/26/2023 11:34 AM EDT  Subject: Non urgert    I got the Lantus solostar insulin glaring injection 100 units but I needw the needles to go with it [facility] [place]

## 2023-04-27 DIAGNOSIS — R74.8 ALKALINE PHOSPHATASE ELEVATION: Primary | ICD-10-CM

## 2023-04-27 DIAGNOSIS — R74.01 ELEVATED AST (SGOT): ICD-10-CM

## 2023-04-27 DIAGNOSIS — R74.8 ALKALINE PHOSPHATASE ELEVATION: ICD-10-CM

## 2023-04-27 LAB
CANDIDA DNA VAG QL NAA+PROBE: ABNORMAL
G VAGINALIS DNA SPEC QL NAA+PROBE: ABNORMAL
PTH-INTACT SERPL-MCNC: 35.4 PG/ML (ref 14–72)
T VAGINALIS DNA VAG QL NAA+PROBE: ABNORMAL

## 2023-04-28 ENCOUNTER — TELEPHONE (OUTPATIENT)
Dept: FAMILY MEDICINE CLINIC | Age: 43
End: 2023-04-28
Payer: MEDICARE

## 2023-04-28 DIAGNOSIS — R74.8 ELEVATED ALKALINE PHOSPHATASE LEVEL: ICD-10-CM

## 2023-04-28 DIAGNOSIS — R10.9 LEFT SIDED ABDOMINAL PAIN: Primary | ICD-10-CM

## 2023-04-28 DIAGNOSIS — R74.8 ELEVATED ALKALINE PHOSPHATASE IN NEWBORN: Primary | ICD-10-CM

## 2023-04-28 PROCEDURE — 99442 PR PHYS/QHP TELEPHONE EVALUATION 11-20 MIN: CPT

## 2023-04-28 NOTE — TELEPHONE ENCOUNTER
Pt contacted me on call regarding left sided abd pain that started yesterday. States is sharp in nature and comes and goes. Denies fever, nauseas, vomiting, constipation, diarrhea. Had CMP and UA checked x3 days ago all normal. Did not mention any abd symptoms at that time in office. Recommened being seen at local ED if symptoms stay persistent or has and fever or vomiting. Still has appendix and gull bladder. Explained most common left sided abd pain is diverticulosis and diverticulitis or constipation. Cannot exclude acute abd being telephone call visit. If does not be seen in ED and symptoms persist OV Monday. 11-20 minutes spent reviewing chart and discussion pt condition and recommendations. This document was prepared by a combination of typing and transcription through a voice recognition software.     Holly Sanchez, TIARA - CNP

## 2023-04-29 DIAGNOSIS — I10 ESSENTIAL HYPERTENSION, BENIGN: ICD-10-CM

## 2023-05-01 RX ORDER — LISINOPRIL AND HYDROCHLOROTHIAZIDE 20; 12.5 MG/1; MG/1
1 TABLET ORAL DAILY
Qty: 90 TABLET | Refills: 1 | OUTPATIENT
Start: 2023-05-01

## 2023-05-02 ENCOUNTER — PATIENT MESSAGE (OUTPATIENT)
Dept: FAMILY MEDICINE CLINIC | Age: 43
End: 2023-05-02

## 2023-05-02 ENCOUNTER — HOSPITAL ENCOUNTER (OUTPATIENT)
Dept: ULTRASOUND IMAGING | Age: 43
Discharge: HOME | End: 2023-05-02
Payer: MEDICARE

## 2023-05-02 DIAGNOSIS — I10 ESSENTIAL HYPERTENSION, BENIGN: ICD-10-CM

## 2023-05-02 DIAGNOSIS — R74.8 ELEVATED ALKALINE PHOSPHATASE LEVEL: ICD-10-CM

## 2023-05-02 PROCEDURE — 76705 ECHO EXAM OF ABDOMEN: CPT

## 2023-05-02 RX ORDER — LISINOPRIL AND HYDROCHLOROTHIAZIDE 20; 12.5 MG/1; MG/1
1 TABLET ORAL DAILY
Qty: 90 TABLET | Refills: 3 | Status: SHIPPED | OUTPATIENT
Start: 2023-05-02

## 2023-05-02 RX ORDER — LISINOPRIL AND HYDROCHLOROTHIAZIDE 20; 12.5 MG/1; MG/1
1 TABLET ORAL DAILY
Qty: 90 TABLET | Refills: 1 | Status: CANCELLED | OUTPATIENT
Start: 2023-05-02

## 2023-05-02 NOTE — TELEPHONE ENCOUNTER
From: Gina Liu  To: Coral Maldonado  Sent: 5/2/2023 12:50 PM EDT  Subject: Non urgert     I need a refill of my lisnopral hctz

## 2023-05-06 DIAGNOSIS — E11.65 UNCONTROLLED TYPE 2 DIABETES MELLITUS WITH HYPERGLYCEMIA (HCC): ICD-10-CM

## 2023-05-08 RX ORDER — SITAGLIPTIN 100 MG/1
TABLET, FILM COATED ORAL
Qty: 90 TABLET | Refills: 1 | OUTPATIENT
Start: 2023-05-08

## 2023-05-18 ENCOUNTER — TELEPHONE (OUTPATIENT)
Dept: ORTHOPEDIC SURGERY | Age: 43
End: 2023-05-18

## 2023-05-18 ENCOUNTER — OFFICE VISIT (OUTPATIENT)
Dept: ORTHOPEDIC SURGERY | Age: 43
End: 2023-05-18
Payer: MEDICARE

## 2023-05-18 VITALS — WEIGHT: 242 LBS | BODY MASS INDEX: 45.69 KG/M2 | HEIGHT: 61 IN

## 2023-05-18 DIAGNOSIS — M51.36 DEGENERATIVE DISC DISEASE, LUMBAR: Primary | ICD-10-CM

## 2023-05-18 PROCEDURE — 99213 OFFICE O/P EST LOW 20 MIN: CPT | Performed by: ORTHOPAEDIC SURGERY

## 2023-05-18 PROCEDURE — G8427 DOCREV CUR MEDS BY ELIG CLIN: HCPCS | Performed by: ORTHOPAEDIC SURGERY

## 2023-05-18 PROCEDURE — 1036F TOBACCO NON-USER: CPT | Performed by: ORTHOPAEDIC SURGERY

## 2023-05-18 PROCEDURE — G8417 CALC BMI ABV UP PARAM F/U: HCPCS | Performed by: ORTHOPAEDIC SURGERY
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
## 2023-05-19 ENCOUNTER — OFFICE VISIT (OUTPATIENT)
Dept: ORTHOPEDIC SURGERY | Age: 43
End: 2023-05-19

## 2023-05-19 VITALS — WEIGHT: 242 LBS | HEIGHT: 61 IN | BODY MASS INDEX: 45.69 KG/M2

## 2023-05-19 DIAGNOSIS — M87.9 OSTEONECROSIS OF RIGHT KNEE REGION (HCC): Primary | ICD-10-CM

## 2023-05-19 DIAGNOSIS — M94.261 CHONDROMALACIA OF RIGHT KNEE: ICD-10-CM

## 2023-05-19 DIAGNOSIS — M25.561 RIGHT KNEE PAIN, UNSPECIFIED CHRONICITY: ICD-10-CM

## 2023-05-19 RX ORDER — MELOXICAM 15 MG/1
15 TABLET ORAL DAILY PRN
Qty: 30 TABLET | Refills: 0 | Status: SHIPPED | OUTPATIENT
Start: 2023-05-19
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
## 2023-05-26 ENCOUNTER — OFFICE VISIT (OUTPATIENT)
Dept: FAMILY MEDICINE CLINIC | Age: 43
End: 2023-05-26
Payer: MEDICARE

## 2023-05-26 VITALS
OXYGEN SATURATION: 96 % | SYSTOLIC BLOOD PRESSURE: 112 MMHG | HEART RATE: 90 BPM | DIASTOLIC BLOOD PRESSURE: 72 MMHG | WEIGHT: 249 LBS | BODY MASS INDEX: 47.05 KG/M2

## 2023-05-26 DIAGNOSIS — J01.90 ACUTE SINUSITIS, RECURRENCE NOT SPECIFIED, UNSPECIFIED LOCATION: ICD-10-CM

## 2023-05-26 DIAGNOSIS — H92.03 OTALGIA OF BOTH EARS: ICD-10-CM

## 2023-05-26 DIAGNOSIS — E11.65 UNCONTROLLED TYPE 2 DIABETES MELLITUS WITH HYPERGLYCEMIA (HCC): Primary | ICD-10-CM

## 2023-05-26 DIAGNOSIS — E78.2 MIXED HYPERLIPIDEMIA: ICD-10-CM

## 2023-05-26 LAB
CHOLEST SERPL-MCNC: 213 MG/DL (ref 0–199)
HDLC SERPL-MCNC: 42 MG/DL (ref 40–60)
LDLC SERPL CALC-MCNC: 111 MG/DL
TRIGL SERPL-MCNC: 300 MG/DL (ref 0–150)
VLDLC SERPL CALC-MCNC: 60 MG/DL

## 2023-05-26 PROCEDURE — 3078F DIAST BP <80 MM HG: CPT

## 2023-05-26 PROCEDURE — 36415 COLL VENOUS BLD VENIPUNCTURE: CPT

## 2023-05-26 PROCEDURE — G8417 CALC BMI ABV UP PARAM F/U: HCPCS

## 2023-05-26 PROCEDURE — 3074F SYST BP LT 130 MM HG: CPT

## 2023-05-26 PROCEDURE — 2022F DILAT RTA XM EVC RTNOPTHY: CPT

## 2023-05-26 PROCEDURE — 3046F HEMOGLOBIN A1C LEVEL >9.0%: CPT

## 2023-05-26 PROCEDURE — 99214 OFFICE O/P EST MOD 30 MIN: CPT

## 2023-05-26 PROCEDURE — 1036F TOBACCO NON-USER: CPT

## 2023-05-26 PROCEDURE — G8427 DOCREV CUR MEDS BY ELIG CLIN: HCPCS

## 2023-05-26 RX ORDER — CEFDINIR 300 MG/1
300 CAPSULE ORAL 2 TIMES DAILY
Qty: 20 CAPSULE | Refills: 0 | Status: SHIPPED | OUTPATIENT
Start: 2023-05-26 | End: 2023-06-05

## 2023-05-26 ASSESSMENT — ENCOUNTER SYMPTOMS
WHEEZING: 0
GASTROINTESTINAL NEGATIVE: 1
EYE ITCHING: 1
SHORTNESS OF BREATH: 0
SORE THROAT: 1
SINUS PRESSURE: 1
COUGH: 0

## 2023-05-26 NOTE — PROGRESS NOTES
Mouth/Throat:      Pharynx: Posterior oropharyngeal erythema present. No oropharyngeal exudate. Cardiovascular:      Rate and Rhythm: Normal rate and regular rhythm. Pulses: Normal pulses. Heart sounds: Normal heart sounds. Pulmonary:      Effort: Pulmonary effort is normal.      Breath sounds: Normal breath sounds. No wheezing. Abdominal:      General: Bowel sounds are normal.   Musculoskeletal:         General: Normal range of motion. Skin:     General: Skin is warm. Neurological:      General: No focal deficit present. Mental Status: She is alert and oriented to person, place, and time. Psychiatric:         Mood and Affect: Mood normal.         Behavior: Behavior normal.         ASSESSMENT/PLAN:    1. Uncontrolled type 2 diabetes mellitus with hyperglycemia ([address]  Repeat labs today. For the time being continue on current treatment plan. We will review hemoglobin A1c result and determine neck step and treatment options for patient. I am open to trying the patient on Mounjaro. We will make adjustments accordingly based on A1c result. - Hemoglobin A1C  - LIPID PANEL    2. Mixed hyperlipidemia  Repeat labs today. For the time being continue on current treatment plan as ordered. - LIPID PANEL    3. Otalgia of both ears  Left middle ear effusion noted in office today. Suspect correlated with sinusitis symptoms. We will treat with cefdinir. Recommended the patient use Flonase and Zyrtec daily x1 week to aid in sinus symptom relief which will also help aid in middle ear effusion relief. Follow-up with office as needed. - cefdinir (OMNICEF) 300 MG capsule; Take 1 capsule by mouth 2 times daily for 10 days  Dispense: 20 capsule; Refill: 0    4. Acute sinusitis, recurrence not specified, unspecified location  Given duration of symptoms with no improvement we will treat the patient with cefdinir for sinusitis.   Patient advised to use Flonase and Zyrtec daily x1 week to aid in sinus

## 2023-05-27 LAB
EST. AVERAGE GLUCOSE BLD GHB EST-MCNC: 271.9 MG/DL
HBA1C MFR BLD: 11.1 %

## 2023-05-30 DIAGNOSIS — E78.2 MIXED HYPERLIPIDEMIA: Primary | ICD-10-CM

## 2023-05-30 DIAGNOSIS — E11.65 UNCONTROLLED TYPE 2 DIABETES MELLITUS WITH HYPERGLYCEMIA (HCC): ICD-10-CM

## 2023-05-30 RX ORDER — FENOFIBRATE 48 MG/1
48 TABLET, COATED ORAL DAILY
Qty: 30 TABLET | Refills: 3 | Status: SHIPPED | OUTPATIENT
Start: 2023-05-30

## 2023-05-31 ENCOUNTER — PATIENT MESSAGE (OUTPATIENT)
Dept: FAMILY MEDICINE CLINIC | Age: 43
End: 2023-05-31

## 2023-05-31 NOTE — TELEPHONE ENCOUNTER
From: Melquiades Rodríguez  To: Sage Sheets  Sent: 5/31/2023 2:30 PM EDT  Subject: Non urgert     Do you think I c ould get an in insulin pump or free style lybe2

## 2023-06-08 NOTE — TELEPHONE ENCOUNTER
Post-Op Assessment Note    CV Status:  Stable    Pain management: adequate     Mental Status:  Alert and awake   Hydration Status:  Stable   PONV Controlled:  None   Airway Patency:  Patent      Post Op Vitals Reviewed: Yes      Staff: CRNA         No notable events documented      BP   127/63   Temp 97 8 °F (36 6 °C) (06/08/23 1536)    Pulse  69   Resp   16   SpO2   100 Tierra Kumar    I see the request for Zomig was denied  Because it did not state she had migraines which is the dx I used as well as triptans have not worked for her ( imitrex) - Can this be resubmitted?         Thanks   Jenniffer

## 2023-06-27 LAB
CREATININE, URINE: <30
MICROALBUMIN/CREAT 24H UR: 10 MG/G{CREAT}
MICROALBUMIN/CREAT UR-RTO: 10

## 2023-08-08 ENCOUNTER — OFFICE VISIT (OUTPATIENT)
Dept: ORTHOPEDIC SURGERY | Age: 43
End: 2023-08-08

## 2023-08-08 VITALS — WEIGHT: 249 LBS | BODY MASS INDEX: 47.01 KG/M2 | HEIGHT: 61 IN

## 2023-08-08 DIAGNOSIS — M47.816 LUMBAR SPONDYLOSIS: ICD-10-CM

## 2023-08-08 DIAGNOSIS — M47.816 LUMBAR FACET ARTHROPATHY: Primary | ICD-10-CM

## 2023-08-08 DIAGNOSIS — M51.26 LUMBAR DISCOGENIC PAIN SYNDROME: ICD-10-CM

## 2023-08-08 DIAGNOSIS — M51.27 DISPLACEMENT OF LUMBOSACRAL INTERVERTEBRAL DISC: ICD-10-CM

## 2023-08-08 NOTE — PROGRESS NOTES
canal stenosis, or neural foraminal narrowing. CONCLUSION:   Moderate facet arthropathy at L5-S1 with a distended left facet and capsulitis. Posterior   annular fissure. Findings are similar compared to the prior study. No spinal nerve   impingement. Thank you for the opportunity to provide your interpretation. Jesika Ross MD     A: CK/FELIX/amy 12/13/2022 9:07 AM   T: AMY 12/12/2022 2:58 PM             Specimen Collected: 12/13/22 09:07 EST Last Resulted: 12/                 Assessment   Impression: . Encounter Diagnoses   Name Primary? Lumbar facet arthropathy Yes    Lumbar spondylosis     Lumbar discogenic pain syndrome     Displacement of lumbosacral intervertebral disc               Plan:       Retrial of physical therapy lumbar stabilization/Fabio program facet mobilization techniques  Consider diagnostic LMBB #2 as needed at  Activity modification lumbar spine precautions  Lumbar stabilization home exercise program and  Medical pain management as per previous OTC NSAIDs-Motrin or Aleve with GI precautions       Orders:        Orders Placed This Encounter   Procedures    [facility]     Referral Priority:   Routine     Referral Type:   Eval and Treat     Referral Reason:   Specialty Services Required     Requested Specialty:   Physical Therapist     Number of Visits Requested:   1         oRlando Lopez MD.      Arvin Siemens: \"This note was dictated with voice recognition software. Though review and correction are routine, we apologize for any errors. \"

## 2023-08-14 ENCOUNTER — HOSPITAL ENCOUNTER (OUTPATIENT)
Dept: PHYSICAL THERAPY | Age: 43
Setting detail: THERAPIES SERIES
Discharge: HOME | End: 2023-08-14

## 2023-08-14 NOTE — FLOWSHEET NOTE
[address], [facility]    Physical Therapy  Cancellation/No-show Note  Patient Name:  Betty Katz  :  1980   Date:  2023  Cancelled visits to date: 1  No-shows to date: 0    For today's appointment patient:  [x]  Cancelled  []  Rescheduled appointment  []  No-show     Reason given by patient:  []  Patient ill  []  Conflicting appointment  []  No transportation    []  Conflict with work  [x]  No reason given  []  Other:     Comments:      Phone call information:   []  Phone call made today to patient at _ time at number provided:      []  Patient answered, conversation as follows:    []  Patient did not answer, message left as follows:  []  Phone call not made today  [x]  Phone call not needed - pt contacted us to cancel and provided reason for cancellation.      Electronically signed by:  Nitza Zaragoza, PT, DPT

## 2023-08-17 LAB
AVERAGE GLUCOSE: NORMAL
HBA1C MFR BLD: 8.6 %

## 2023-08-24 ENCOUNTER — OFFICE VISIT (OUTPATIENT)
Dept: FAMILY MEDICINE CLINIC | Age: 43
End: 2023-08-24
Payer: MEDICARE

## 2023-08-24 VITALS
TEMPERATURE: 97.5 F | BODY MASS INDEX: 47.88 KG/M2 | DIASTOLIC BLOOD PRESSURE: 80 MMHG | HEART RATE: 83 BPM | HEIGHT: 61 IN | SYSTOLIC BLOOD PRESSURE: 124 MMHG | OXYGEN SATURATION: 99 % | WEIGHT: 253.6 LBS

## 2023-08-24 DIAGNOSIS — I10 ESSENTIAL HYPERTENSION: ICD-10-CM

## 2023-08-24 DIAGNOSIS — G89.29 CHRONIC RIGHT SHOULDER PAIN: ICD-10-CM

## 2023-08-24 DIAGNOSIS — J45.20 MILD INTERMITTENT ASTHMA WITHOUT COMPLICATION: ICD-10-CM

## 2023-08-24 DIAGNOSIS — F32.A ANXIETY AND DEPRESSION: ICD-10-CM

## 2023-08-24 DIAGNOSIS — E78.2 MIXED HYPERLIPIDEMIA: ICD-10-CM

## 2023-08-24 DIAGNOSIS — K21.9 GASTROESOPHAGEAL REFLUX DISEASE WITHOUT ESOPHAGITIS: ICD-10-CM

## 2023-08-24 DIAGNOSIS — F41.9 ANXIETY AND DEPRESSION: ICD-10-CM

## 2023-08-24 DIAGNOSIS — K58.2 IRRITABLE BOWEL SYNDROME WITH BOTH CONSTIPATION AND DIARRHEA: ICD-10-CM

## 2023-08-24 DIAGNOSIS — F31.9 BIPOLAR 1 DISORDER, DEPRESSED (HCC): ICD-10-CM

## 2023-08-24 DIAGNOSIS — E11.65 UNCONTROLLED TYPE 2 DIABETES MELLITUS WITH HYPERGLYCEMIA (HCC): Primary | ICD-10-CM

## 2023-08-24 DIAGNOSIS — G47.33 OSA (OBSTRUCTIVE SLEEP APNEA): ICD-10-CM

## 2023-08-24 DIAGNOSIS — G47.09 OTHER INSOMNIA: ICD-10-CM

## 2023-08-24 DIAGNOSIS — M25.511 CHRONIC RIGHT SHOULDER PAIN: ICD-10-CM

## 2023-08-24 PROBLEM — I15.8 OTHER SECONDARY HYPERTENSION: Status: RESOLVED | Noted: 2022-09-03 | Resolved: 2023-08-24

## 2023-08-24 PROCEDURE — 3052F HG A1C>EQUAL 8.0%<EQUAL 9.0%: CPT | Performed by: FAMILY MEDICINE

## 2023-08-24 PROCEDURE — G8427 DOCREV CUR MEDS BY ELIG CLIN: HCPCS | Performed by: FAMILY MEDICINE

## 2023-08-24 PROCEDURE — 2022F DILAT RTA XM EVC RTNOPTHY: CPT | Performed by: FAMILY MEDICINE

## 2023-08-24 PROCEDURE — 1036F TOBACCO NON-USER: CPT | Performed by: FAMILY MEDICINE

## 2023-08-24 PROCEDURE — 3074F SYST BP LT 130 MM HG: CPT | Performed by: FAMILY MEDICINE

## 2023-08-24 PROCEDURE — G8417 CALC BMI ABV UP PARAM F/U: HCPCS | Performed by: FAMILY MEDICINE

## 2023-08-24 PROCEDURE — 3079F DIAST BP 80-89 MM HG: CPT | Performed by: FAMILY MEDICINE

## 2023-08-24 PROCEDURE — 99214 OFFICE O/P EST MOD 30 MIN: CPT | Performed by: FAMILY MEDICINE

## 2023-08-24 RX ORDER — FLUTICASONE PROPIONATE AND SALMETEROL 250; 50 UG/1; UG/1
POWDER RESPIRATORY (INHALATION)
COMMUNITY
Start: 2023-08-04

## 2023-08-24 RX ORDER — FENOFIBRATE 48 MG/1
48 TABLET, COATED ORAL DAILY
Qty: 90 TABLET | Refills: 1 | Status: SHIPPED | OUTPATIENT
Start: 2023-08-24

## 2023-08-24 RX ORDER — LISINOPRIL AND HYDROCHLOROTHIAZIDE 20; 12.5 MG/1; MG/1
1 TABLET ORAL DAILY
Qty: 90 TABLET | Refills: 1 | Status: SHIPPED | OUTPATIENT
Start: 2023-08-24

## 2023-08-24 RX ORDER — FAMOTIDINE 20 MG/1
TABLET, FILM COATED ORAL
COMMUNITY
Start: 2023-08-18 | End: 2023-08-24

## 2023-08-24 ASSESSMENT — PATIENT HEALTH QUESTIONNAIRE - PHQ9
4. FEELING TIRED OR HAVING LITTLE ENERGY: 1
SUM OF ALL RESPONSES TO PHQ QUESTIONS 1-9: 12
7. TROUBLE CONCENTRATING ON THINGS, SUCH AS READING THE NEWSPAPER OR WATCHING TELEVISION: 1
SUM OF ALL RESPONSES TO PHQ QUESTIONS 1-9: 12
9. THOUGHTS THAT YOU WOULD BE BETTER OFF DEAD, OR OF HURTING YOURSELF: 0
SUM OF ALL RESPONSES TO PHQ QUESTIONS 1-9: 12
10. IF YOU CHECKED OFF ANY PROBLEMS, HOW DIFFICULT HAVE THESE PROBLEMS MADE IT FOR YOU TO DO YOUR WORK, TAKE CARE OF THINGS AT HOME, OR GET ALONG WITH OTHER PEOPLE: 1
8. MOVING OR SPEAKING SO SLOWLY THAT OTHER PEOPLE COULD HAVE NOTICED. OR THE OPPOSITE, BEING SO FIGETY OR RESTLESS THAT YOU HAVE BEEN MOVING AROUND A LOT MORE THAN USUAL: 1
SUM OF ALL RESPONSES TO PHQ QUESTIONS 1-9: 12
SUM OF ALL RESPONSES TO PHQ9 QUESTIONS 1 & 2: 6
5. POOR APPETITE OR OVEREATING: 0
1. LITTLE INTEREST OR PLEASURE IN DOING THINGS: 3
2. FEELING DOWN, DEPRESSED OR HOPELESS: 3
6. FEELING BAD ABOUT YOURSELF - OR THAT YOU ARE A FAILURE OR HAVE LET YOURSELF OR YOUR FAMILY DOWN: 0
3. TROUBLE FALLING OR STAYING ASLEEP: 3

## 2023-08-24 NOTE — PROGRESS NOTES
tablet; Take 1 tablet by mouth daily    Mild intermittent asthma without complication    Irritable bowel syndrome with both constipation and diarrhea    Gastroesophageal reflux disease without esophagitis    Anxiety and depression    Bipolar 1 disorder, depressed (HCC)    Other insomnia    PRISCILLA (obstructive sleep apnea)    Chronic right shoulder pain  -     MRI SHOULDER RIGHT WO CONTRAST; Future      Advise given:  - Get appropriate investigations done. Will call back with the results and will manage accordingly. - Take all prescription medication as prescribed. - Drink plenty of fluids.   - Eat five servings of fruits and vegetables everyday. - Discussed importance of regular exercise and recommended starting or continuing a regular exercise program for good health. - Try to lose weight with diet and exercise as discussed. - The importance of monitoring blood sugar regularly was reviewed. - The importance of annual eye exams was reviewed. - The importance of proper foot care and regularly checking feet to prevent sores and possibly loss of limbs was reviewed. - The importance of keeping the blood pressure monitoring to prevent stroke, heart attacks, kidney failure, blindness, and loss of limbs was reviewed. - Appropriate handout were given to the patient. -  All the questions and concerns were appropriately answered. - Patient / family member / caregiver verbalized understanding of patient instructions from today's visit. - The patient was advised to follow up with me in 3 months for recheck or can call me before if has any other questions or concerns.

## 2023-08-30 LAB
GLUCOSE BLD-MCNC: 133 MG/DL (ref 70–99)
PREGNANCY, URINE: NEGATIVE

## 2023-09-01 LAB
CYTOLOGY REPORT: NORMAL

## 2023-09-05 ENCOUNTER — TELEPHONE (OUTPATIENT)
Dept: FAMILY MEDICINE CLINIC | Age: 43
End: 2023-09-05

## 2023-09-05 LAB
CYTOLOGY REPORT: NORMAL

## 2023-09-05 NOTE — TELEPHONE ENCOUNTER
----- Message from Gloria Smyth sent at 9/5/2023 12:43 PM EDT -----  Subject: Referral Request    Reason for referral request? Patient is requesting a referral for   Behavioral Health. Please call patient when referral is in so they can   appoint. Thank you  Provider patient wants to be referred to(if known):     Provider Phone Number(if known):     Additional Information for Provider?   ---------------------------------------------------------------------------  --------------  [address]    [phone]; OK to leave message on voicemail  ---------------------------------------------------------------------------  --------------

## 2023-09-05 NOTE — TELEPHONE ENCOUNTER
Please help this patient get appointment with the psychologist through the . Please also inquire if the patient wants to be referred specifically to some other facility.

## 2023-09-07 NOTE — PROGRESS NOTES
Alicia Carpio received a viral test for COVID-19. They were educated on isolation and quarantine as appropriate. For any symptoms, they were directed to seek care from their PCP, given contact information to establish with a doctor, directed to an urgent care or the emergency room. Detail Level: Zone Render In Strict Bullet Format?: No Modify Regimen: Increase isotretinoin from 30mg daily to 40mg daily

## 2023-09-13 ENCOUNTER — OFFICE VISIT (OUTPATIENT)
Dept: FAMILY MEDICINE CLINIC | Age: 43
End: 2023-09-13
Payer: MEDICARE

## 2023-09-13 VITALS
SYSTOLIC BLOOD PRESSURE: 110 MMHG | OXYGEN SATURATION: 98 % | HEIGHT: 61 IN | TEMPERATURE: 97.5 F | HEART RATE: 92 BPM | WEIGHT: 247 LBS | DIASTOLIC BLOOD PRESSURE: 72 MMHG | BODY MASS INDEX: 46.63 KG/M2

## 2023-09-13 DIAGNOSIS — J98.59 MEDIASTINAL MASS: Primary | ICD-10-CM

## 2023-09-13 DIAGNOSIS — K76.89 HEPATIC CALCIFICATION: ICD-10-CM

## 2023-09-13 PROCEDURE — G8427 DOCREV CUR MEDS BY ELIG CLIN: HCPCS | Performed by: FAMILY MEDICINE

## 2023-09-13 PROCEDURE — 3074F SYST BP LT 130 MM HG: CPT | Performed by: FAMILY MEDICINE

## 2023-09-13 PROCEDURE — G8417 CALC BMI ABV UP PARAM F/U: HCPCS | Performed by: FAMILY MEDICINE

## 2023-09-13 PROCEDURE — 1036F TOBACCO NON-USER: CPT | Performed by: FAMILY MEDICINE

## 2023-09-13 PROCEDURE — 3078F DIAST BP <80 MM HG: CPT | Performed by: FAMILY MEDICINE

## 2023-09-13 PROCEDURE — 99213 OFFICE O/P EST LOW 20 MIN: CPT | Performed by: FAMILY MEDICINE

## 2023-09-26 ENCOUNTER — OFFICE VISIT (OUTPATIENT)
Dept: FAMILY MEDICINE CLINIC | Age: 43
End: 2023-09-26
Payer: MEDICARE

## 2023-09-26 VITALS
SYSTOLIC BLOOD PRESSURE: 110 MMHG | BODY MASS INDEX: 47.31 KG/M2 | WEIGHT: 250.6 LBS | TEMPERATURE: 97.3 F | OXYGEN SATURATION: 97 % | HEART RATE: 96 BPM | DIASTOLIC BLOOD PRESSURE: 70 MMHG | HEIGHT: 61 IN

## 2023-09-26 DIAGNOSIS — Z23 NEEDS FLU SHOT: ICD-10-CM

## 2023-09-26 DIAGNOSIS — N89.8 VAGINAL PRURITUS: Primary | ICD-10-CM

## 2023-09-26 LAB
BILIRUBIN, POC: NEGATIVE
BLOOD URINE, POC: NEGATIVE
CLARITY, POC: CLEAR
COLOR, POC: YELLOW
GLUCOSE URINE, POC: NORMAL
KETONES, POC: NEGATIVE
LEUKOCYTE EST, POC: NEGATIVE
NITRITE, POC: NEGATIVE
PH, POC: 5.5
PROTEIN, POC: NEGATIVE
SPECIFIC GRAVITY, POC: 1.01
UROBILINOGEN, POC: NORMAL

## 2023-09-26 PROCEDURE — 99213 OFFICE O/P EST LOW 20 MIN: CPT | Performed by: FAMILY MEDICINE

## 2023-09-26 PROCEDURE — 81002 URINALYSIS NONAUTO W/O SCOPE: CPT | Performed by: FAMILY MEDICINE

## 2023-09-26 PROCEDURE — 3078F DIAST BP <80 MM HG: CPT | Performed by: FAMILY MEDICINE

## 2023-09-26 PROCEDURE — G8427 DOCREV CUR MEDS BY ELIG CLIN: HCPCS | Performed by: FAMILY MEDICINE

## 2023-09-26 PROCEDURE — G0008 ADMIN INFLUENZA VIRUS VAC: HCPCS | Performed by: FAMILY MEDICINE

## 2023-09-26 PROCEDURE — 1036F TOBACCO NON-USER: CPT | Performed by: FAMILY MEDICINE

## 2023-09-26 PROCEDURE — G8417 CALC BMI ABV UP PARAM F/U: HCPCS | Performed by: FAMILY MEDICINE

## 2023-09-26 PROCEDURE — 3074F SYST BP LT 130 MM HG: CPT | Performed by: FAMILY MEDICINE

## 2023-09-26 PROCEDURE — 90674 CCIIV4 VAC NO PRSV 0.5 ML IM: CPT | Performed by: FAMILY MEDICINE

## 2023-09-26 RX ORDER — FLUCONAZOLE 150 MG/1
TABLET ORAL
Qty: 2 TABLET | Refills: 0 | Status: SHIPPED | OUTPATIENT
Start: 2023-09-26

## 2023-09-28 ENCOUNTER — OFFICE VISIT (OUTPATIENT)
Dept: PSYCHIATRY | Age: 43
End: 2023-09-28

## 2023-09-28 VITALS
HEART RATE: 106 BPM | WEIGHT: 250.4 LBS | HEIGHT: 61 IN | DIASTOLIC BLOOD PRESSURE: 60 MMHG | BODY MASS INDEX: 47.28 KG/M2 | SYSTOLIC BLOOD PRESSURE: 118 MMHG

## 2023-09-28 DIAGNOSIS — F31.81 BIPOLAR II DISORDER (HCC): Primary | ICD-10-CM

## 2023-09-28 RX ORDER — HYDROXYZINE PAMOATE 50 MG/1
50 CAPSULE ORAL 3 TIMES DAILY PRN
Qty: 270 CAPSULE | Refills: 0 | Status: SHIPPED | OUTPATIENT
Start: 2023-09-28 | End: 2023-12-27

## 2023-09-28 RX ORDER — TRAZODONE HYDROCHLORIDE 100 MG/1
100 TABLET ORAL NIGHTLY
Qty: 90 TABLET | Refills: 0 | Status: SHIPPED | OUTPATIENT
Start: 2023-09-28 | End: 2023-12-27

## 2023-09-28 RX ORDER — LURASIDONE HYDROCHLORIDE 80 MG/1
80 TABLET, FILM COATED ORAL
Qty: 90 TABLET | Refills: 0 | Status: SHIPPED | OUTPATIENT
Start: 2023-09-28 | End: 2023-12-27

## 2023-09-28 ASSESSMENT — PATIENT HEALTH QUESTIONNAIRE - PHQ9
3. TROUBLE FALLING OR STAYING ASLEEP: 3
7. TROUBLE CONCENTRATING ON THINGS, SUCH AS READING THE NEWSPAPER OR WATCHING TELEVISION: 3
6. FEELING BAD ABOUT YOURSELF - OR THAT YOU ARE A FAILURE OR HAVE LET YOURSELF OR YOUR FAMILY DOWN: 1
2. FEELING DOWN, DEPRESSED OR HOPELESS: 3
SUM OF ALL RESPONSES TO PHQ QUESTIONS 1-9: 17
SUM OF ALL RESPONSES TO PHQ QUESTIONS 1-9: 17
SUM OF ALL RESPONSES TO PHQ9 QUESTIONS 1 & 2: 6
1. LITTLE INTEREST OR PLEASURE IN DOING THINGS: 3
SUM OF ALL RESPONSES TO PHQ QUESTIONS 1-9: 17
10. IF YOU CHECKED OFF ANY PROBLEMS, HOW DIFFICULT HAVE THESE PROBLEMS MADE IT FOR YOU TO DO YOUR WORK, TAKE CARE OF THINGS AT HOME, OR GET ALONG WITH OTHER PEOPLE: 1
5. POOR APPETITE OR OVEREATING: 0
SUM OF ALL RESPONSES TO PHQ QUESTIONS 1-9: 17
9. THOUGHTS THAT YOU WOULD BE BETTER OFF DEAD, OR OF HURTING YOURSELF: 0
4. FEELING TIRED OR HAVING LITTLE ENERGY: 3
8. MOVING OR SPEAKING SO SLOWLY THAT OTHER PEOPLE COULD HAVE NOTICED. OR THE OPPOSITE, BEING SO FIGETY OR RESTLESS THAT YOU HAVE BEEN MOVING AROUND A LOT MORE THAN USUAL: 1

## 2023-09-28 ASSESSMENT — ANXIETY QUESTIONNAIRES
5. BEING SO RESTLESS THAT IT IS HARD TO SIT STILL: 1
IF YOU CHECKED OFF ANY PROBLEMS ON THIS QUESTIONNAIRE, HOW DIFFICULT HAVE THESE PROBLEMS MADE IT FOR YOU TO DO YOUR WORK, TAKE CARE OF THINGS AT HOME, OR GET ALONG WITH OTHER PEOPLE: SOMEWHAT DIFFICULT
6. BECOMING EASILY ANNOYED OR IRRITABLE: 3
7. FEELING AFRAID AS IF SOMETHING AWFUL MIGHT HAPPEN: 0
3. WORRYING TOO MUCH ABOUT DIFFERENT THINGS: 3
1. FEELING NERVOUS, ANXIOUS, OR ON EDGE: 3
GAD7 TOTAL SCORE: 16
4. TROUBLE RELAXING: 3
2. NOT BEING ABLE TO STOP OR CONTROL WORRYING: 3

## 2023-09-28 NOTE — PATIENT INSTRUCTIONS
[address] [facility] ([facility])  Phone: [phone]   [address] [facility], [address] [facility]; [facility] [address]  What to bring for new intake visits: photo ID, proof of healthcare coverage if any, and proof of income. [address]  Phone: [phone]  Outpatient mental health assessments are available on a walk-in basis. No appointment necessary during open access hours. Walk-in times: 8:00 am to 3:30 pm, Monday through Friday (except holidays)     [facility] [address], [address]: [address], [address] [address] [phone]    Please bring the following documents with you: State ID or drivers' license, Social Security Card, [place] or other insurance card, Proof of income, such as a bank statement, pay stub, or W-2, Proof of residency, such as a utility bill, Proof of major medical expenses, child support, or alimony (if applicable)    [address] [facility],[address] ([facility])   [facility] provides assessment, support, and connections for children and adults residing in [place] who are in need of mental health services. Kita Reid operates a 24-Hour hotline to answer calls and connect consumers to appropriate services. To access [facility], call [phone].      In the event of suicidal ideations or psychiatric emergency you can:  Call 911 or go to the nearest emergency room  Call the suicide hotline at 38 293935  Call the [facility] Mobile Crisis Team at [phone]

## 2023-10-02 ENCOUNTER — TELEPHONE (OUTPATIENT)
Dept: BARIATRICS/WEIGHT MGMT | Age: 43
End: 2023-10-02

## 2023-10-02 NOTE — TELEPHONE ENCOUNTER
Patient was sent Dr. Azeb Waggoner digital bariatric seminar. Patient DOES have BWLS coverage with [facility] Dual Complete (No Req Diet)     Bariatric Benefits scanned in media.

## 2023-10-11 ENCOUNTER — HOSPITAL ENCOUNTER (OUTPATIENT)
Age: 43
Discharge: HOME | End: 2023-10-11
Payer: MEDICARE

## 2023-10-11 LAB
ALBUMIN SERPL-MCNC: 4.6 G/DL (ref 3.4–5)
ALP SERPL-CCNC: 118 U/L (ref 40–129)
ALT SERPL-CCNC: 22 U/L (ref 10–40)
AST SERPL-CCNC: 17 U/L (ref 15–37)
BILIRUB DIRECT SERPL-MCNC: <0.2 MG/DL (ref 0–0.3)
BILIRUB INDIRECT SERPL-MCNC: ABNORMAL MG/DL (ref 0–1)
BILIRUB SERPL-MCNC: 0.4 MG/DL (ref 0–1)
FERRITIN SERPL IA-MCNC: 149.6 NG/ML (ref 15–150)
HAV IGM SERPL QL IA: NORMAL
HBV SURFACE AB SERPL IA-ACNC: 72.5 MIU/ML
HBV SURFACE AG SERPL QL IA: NORMAL
HCV AB SERPL QL IA: NORMAL
IGA SERPL-MCNC: 149 MG/DL (ref 70–400)
IRON SATN MFR SERPL: 18 % (ref 15–50)
IRON SERPL-MCNC: 51 UG/DL (ref 37–145)
PROT SERPL-MCNC: 8.5 G/DL (ref 6.4–8.2)
TIBC SERPL-MCNC: 286 UG/DL (ref 260–445)

## 2023-10-11 PROCEDURE — 84165 PROTEIN E-PHORESIS SERUM: CPT

## 2023-10-11 PROCEDURE — 80076 HEPATIC FUNCTION PANEL: CPT

## 2023-10-11 PROCEDURE — 83540 ASSAY OF IRON: CPT

## 2023-10-11 PROCEDURE — 83550 IRON BINDING TEST: CPT

## 2023-10-11 PROCEDURE — 82728 ASSAY OF FERRITIN: CPT

## 2023-10-11 PROCEDURE — 86803 HEPATITIS C AB TEST: CPT

## 2023-10-11 PROCEDURE — 86015 ACTIN ANTIBODY EACH: CPT

## 2023-10-11 PROCEDURE — 86706 HEP B SURFACE ANTIBODY: CPT

## 2023-10-11 PROCEDURE — 86039 ANTINUCLEAR ANTIBODIES (ANA): CPT

## 2023-10-11 PROCEDURE — 82784 ASSAY IGA/IGD/IGG/IGM EACH: CPT

## 2023-10-11 PROCEDURE — 83516 IMMUNOASSAY NONANTIBODY: CPT

## 2023-10-11 PROCEDURE — 86709 HEPATITIS A IGM ANTIBODY: CPT

## 2023-10-11 PROCEDURE — 87340 HEPATITIS B SURFACE AG IA: CPT

## 2023-10-11 PROCEDURE — 86708 HEPATITIS A ANTIBODY: CPT

## 2023-10-11 PROCEDURE — 82390 ASSAY OF CERULOPLASMIN: CPT

## 2023-10-11 PROCEDURE — 82103 ALPHA-1-ANTITRYPSIN TOTAL: CPT

## 2023-10-11 PROCEDURE — 36415 COLL VENOUS BLD VENIPUNCTURE: CPT

## 2023-10-11 PROCEDURE — 84155 ASSAY OF PROTEIN SERUM: CPT

## 2023-10-11 PROCEDURE — 86038 ANTINUCLEAR ANTIBODIES: CPT

## 2023-10-12 LAB
ANA SER QL IA: POSITIVE
HAV AB SER QL IA: NEGATIVE
TISSUE TRANSGLUTAMINASE IGA: <0.5 U/ML (ref 0–14)

## 2023-10-13 LAB
A1AT SERPL-MCNC: 147 MG/DL (ref 90–200)
ALBUMIN SERPL ELPH-MCNC: 3.8 G/DL (ref 3.1–4.9)
ALPHA1 GLOB SERPL ELPH-MCNC: 0.3 G/DL (ref 0.2–0.4)
ALPHA2 GLOB SERPL ELPH-MCNC: 1.1 G/DL (ref 0.4–1.1)
B-GLOBULIN SERPL ELPH-MCNC: 1.4 G/DL (ref 0.9–1.6)
CERULOPLASMIN SERPL-MCNC: 31 MG/DL (ref 16–45)
GAMMA GLOB SERPL ELPH-MCNC: 1.9 G/DL (ref 0.6–1.8)
SMA IGG SER-ACNC: 13 UNITS (ref 0–19)
SPE/IFE INTERPRETATION: NORMAL

## 2023-10-14 LAB
ANA PATTERN: ABNORMAL
ANA TITER: ABNORMAL
ANTINUCLEAR AB INTERPRETIVE COMMENT: ABNORMAL
NUCLEAR IGG SER QL IF: DETECTED

## 2023-10-18 LAB — MITOCHONDRIA M2 AB SER IA-ACNC: 0.6 U/ML (ref 0–4)

## 2023-10-23 ENCOUNTER — HOSPITAL ENCOUNTER (OUTPATIENT)
Dept: MRI IMAGING | Age: 43
Discharge: HOME | End: 2023-10-23

## 2023-10-23 ENCOUNTER — HOSPITAL ENCOUNTER (OUTPATIENT)
Age: 43
Setting detail: SPECIMEN
Discharge: HOME | End: 2023-10-23

## 2023-10-23 DIAGNOSIS — K76.9 HEPATOPATHY: ICD-10-CM

## 2023-10-23 NOTE — PROGRESS NOTES
Renal function labs ordered per protocol based on required criteria for administration of contrast media. Patient assessment for contraindications completed. Contrast administration guidelines were followed based on renal function lab results.
patient

## 2023-10-25 ENCOUNTER — HOSPITAL ENCOUNTER (OUTPATIENT)
Age: 43
Discharge: HOME | End: 2023-10-25
Payer: MEDICARE

## 2023-10-25 DIAGNOSIS — G89.29 CHRONIC RIGHT SHOULDER PAIN: ICD-10-CM

## 2023-10-25 DIAGNOSIS — M25.511 CHRONIC RIGHT SHOULDER PAIN: ICD-10-CM

## 2023-10-25 LAB
ALBUMIN SERPL-MCNC: 4.5 G/DL (ref 3.4–5)
ALBUMIN/GLOB SERPL: 1.3 {RATIO} (ref 1.1–2.2)
ALP SERPL-CCNC: 110 U/L (ref 40–129)
ALT SERPL-CCNC: 25 U/L (ref 10–40)
ANION GAP SERPL CALCULATED.3IONS-SCNC: 14 MMOL/L (ref 3–16)
AST SERPL-CCNC: 27 U/L (ref 15–37)
BILIRUB SERPL-MCNC: <0.2 MG/DL (ref 0–1)
BUN SERPL-MCNC: 21 MG/DL (ref 7–20)
CALCIUM SERPL-MCNC: 9.2 MG/DL (ref 8.3–10.6)
CHLORIDE SERPL-SCNC: 92 MMOL/L (ref 99–110)
CO2 SERPL-SCNC: 26 MMOL/L (ref 21–32)
CREAT SERPL-MCNC: 1.8 MG/DL (ref 0.6–1.1)
GFR SERPLBLD CREATININE-BSD FMLA CKD-EPI: 35 ML/MIN/{1.73_M2}
GLUCOSE SERPL-MCNC: 305 MG/DL (ref 70–99)
POTASSIUM SERPL-SCNC: 4.7 MMOL/L (ref 3.5–5.1)
PROT SERPL-MCNC: 7.9 G/DL (ref 6.4–8.2)
SODIUM SERPL-SCNC: 132 MMOL/L (ref 136–145)

## 2023-10-25 PROCEDURE — 36415 COLL VENOUS BLD VENIPUNCTURE: CPT

## 2023-10-25 PROCEDURE — 80053 COMPREHEN METABOLIC PANEL: CPT

## 2023-10-27 ENCOUNTER — HOSPITAL ENCOUNTER (EMERGENCY)
Age: 43
Discharge: HOME | End: 2023-10-27
Payer: MEDICARE

## 2023-10-27 ENCOUNTER — APPOINTMENT (OUTPATIENT)
Dept: GENERAL RADIOLOGY | Age: 43
End: 2023-10-27
Payer: MEDICARE

## 2023-10-27 VITALS
DIASTOLIC BLOOD PRESSURE: 81 MMHG | TEMPERATURE: 98.3 F | RESPIRATION RATE: 18 BRPM | OXYGEN SATURATION: 99 % | SYSTOLIC BLOOD PRESSURE: 117 MMHG | HEART RATE: 113 BPM

## 2023-10-27 DIAGNOSIS — S46.911A STRAIN OF RIGHT SHOULDER, INITIAL ENCOUNTER: Primary | ICD-10-CM

## 2023-10-27 PROCEDURE — 6360000002 HC RX W HCPCS: Performed by: PHYSICIAN ASSISTANT

## 2023-10-27 PROCEDURE — 73030 X-RAY EXAM OF SHOULDER: CPT

## 2023-10-27 PROCEDURE — 99285 EMERGENCY DEPT VISIT HI MDM: CPT

## 2023-10-27 PROCEDURE — 96372 THER/PROPH/DIAG INJ SC/IM: CPT

## 2023-10-27 RX ORDER — IBUPROFEN 600 MG/1
600 TABLET ORAL 3 TIMES DAILY PRN
Qty: 30 TABLET | Refills: 0 | Status: SHIPPED | OUTPATIENT
Start: 2023-10-27

## 2023-10-27 RX ORDER — CYCLOBENZAPRINE HCL 5 MG
5 TABLET ORAL 2 TIMES DAILY PRN
Qty: 10 TABLET | Refills: 0 | Status: SHIPPED | OUTPATIENT
Start: 2023-10-27 | End: 2023-11-06

## 2023-10-27 RX ORDER — KETOROLAC TROMETHAMINE 30 MG/ML
30 INJECTION, SOLUTION INTRAMUSCULAR; INTRAVENOUS ONCE
Status: COMPLETED | OUTPATIENT
Start: 2023-10-27 | End: 2023-10-27

## 2023-10-27 RX ADMIN — KETOROLAC TROMETHAMINE 30 MG: 60 INJECTION, SOLUTION INTRAMUSCULAR at 15:36

## 2023-10-27 ASSESSMENT — ENCOUNTER SYMPTOMS
SORE THROAT: 0
EYE PAIN: 0
BACK PAIN: 0
RHINORRHEA: 0
NAUSEA: 0
ABDOMINAL PAIN: 0
SHORTNESS OF BREATH: 0
COUGH: 0
VOMITING: 0
CONSTIPATION: 0
DIARRHEA: 0

## 2023-10-27 ASSESSMENT — LIFESTYLE VARIABLES
HOW MANY STANDARD DRINKS CONTAINING ALCOHOL DO YOU HAVE ON A TYPICAL DAY: PATIENT DOES NOT DRINK
HOW OFTEN DO YOU HAVE A DRINK CONTAINING ALCOHOL: NEVER

## 2023-10-31 ENCOUNTER — TELEPHONE (OUTPATIENT)
Dept: BARIATRICS/WEIGHT MGMT | Age: 43
End: 2023-10-31

## 2023-10-31 NOTE — TELEPHONE ENCOUNTER
Called as a new pt courtesy call - left message. Told patient to have new pt paperwork completely filled out, insurance card, and id and to arrive on time at [facility] location. If they didn't have the paperwork filled out and arrive on time may be rescheduled. Also stated if they didn't receive paperwork to let us know so we could get it to them another way. Left office number on message.

## 2023-11-02 ENCOUNTER — OFFICE VISIT (OUTPATIENT)
Dept: BARIATRICS/WEIGHT MGMT | Age: 43
End: 2023-11-02
Payer: MEDICARE

## 2023-11-02 VITALS
BODY MASS INDEX: 46.44 KG/M2 | HEART RATE: 103 BPM | WEIGHT: 246 LBS | HEIGHT: 61 IN | SYSTOLIC BLOOD PRESSURE: 87 MMHG | DIASTOLIC BLOOD PRESSURE: 62 MMHG

## 2023-11-02 DIAGNOSIS — G47.33 OSA (OBSTRUCTIVE SLEEP APNEA): ICD-10-CM

## 2023-11-02 DIAGNOSIS — I10 HYPERTENSION, ESSENTIAL: ICD-10-CM

## 2023-11-02 DIAGNOSIS — K21.9 GASTROESOPHAGEAL REFLUX DISEASE WITHOUT ESOPHAGITIS: ICD-10-CM

## 2023-11-02 DIAGNOSIS — E88.810 METABOLIC SYNDROME: Primary | ICD-10-CM

## 2023-11-02 DIAGNOSIS — E78.2 MIXED HYPERLIPIDEMIA: ICD-10-CM

## 2023-11-02 DIAGNOSIS — E66.01 MORBID OBESITY WITH BMI OF 45.0-49.9, ADULT (HCC): ICD-10-CM

## 2023-11-02 DIAGNOSIS — E11.65 UNCONTROLLED TYPE 2 DIABETES MELLITUS WITH HYPERGLYCEMIA (HCC): ICD-10-CM

## 2023-11-02 PROCEDURE — G8427 DOCREV CUR MEDS BY ELIG CLIN: HCPCS | Performed by: SURGERY

## 2023-11-02 PROCEDURE — 3052F HG A1C>EQUAL 8.0%<EQUAL 9.0%: CPT | Performed by: SURGERY

## 2023-11-02 PROCEDURE — G8417 CALC BMI ABV UP PARAM F/U: HCPCS | Performed by: SURGERY

## 2023-11-02 PROCEDURE — 99204 OFFICE O/P NEW MOD 45 MIN: CPT | Performed by: SURGERY

## 2023-11-02 PROCEDURE — 3078F DIAST BP <80 MM HG: CPT | Performed by: SURGERY

## 2023-11-02 PROCEDURE — 1036F TOBACCO NON-USER: CPT | Performed by: SURGERY

## 2023-11-02 PROCEDURE — 2022F DILAT RTA XM EVC RTNOPTHY: CPT | Performed by: SURGERY

## 2023-11-02 PROCEDURE — 3074F SYST BP LT 130 MM HG: CPT | Performed by: SURGERY

## 2023-11-02 PROCEDURE — G8482 FLU IMMUNIZE ORDER/ADMIN: HCPCS | Performed by: SURGERY

## 2023-11-02 RX ORDER — QUETIAPINE FUMARATE 50 MG/1
50 TABLET, EXTENDED RELEASE ORAL NIGHTLY
COMMUNITY

## 2023-11-02 RX ORDER — CETIRIZINE HYDROCHLORIDE 10 MG/1
10 TABLET ORAL DAILY
COMMUNITY

## 2023-11-02 RX ORDER — FLUOXETINE 10 MG/1
10 CAPSULE ORAL DAILY
COMMUNITY

## 2023-11-02 RX ORDER — EPTINEZUMAB-JJMR 100 MG/ML
INJECTION INTRAVENOUS
COMMUNITY

## 2023-11-02 RX ORDER — HYDROXYZINE 50 MG/1
50 TABLET, FILM COATED ORAL 3 TIMES DAILY PRN
COMMUNITY
End: 2023-11-02 | Stop reason: ALTCHOICE

## 2023-11-06 ENCOUNTER — TELEPHONE (OUTPATIENT)
Dept: FAMILY MEDICINE CLINIC | Age: 43
End: 2023-11-06

## 2023-11-06 NOTE — TELEPHONE ENCOUNTER
----- Message from Elma Hargrove sent at 11/6/2023  1:05 PM EST -----  Subject: Message to Provider    QUESTIONS  Information for Provider? patient have message for Jaylen Monsivais want   to know if she should keep her appt. that is on 11/29/23 or schedule for   an appt. sooner regarding the test that she took concerning her liver   reason patient had test at [facility] on 11/3/23 and results that her   kidney function is off prior to that she had blood work done and have an   appt. on 11/10/23 to get a mri on her liver   ---------------------------------------------------------------------------  --------------  Tammie HORN  [phone]; OK to leave message on voicemail,OK to respond with electronic   message via [employer] portal (only for patients who have registered [employer]   account)  ---------------------------------------------------------------------------  --------------  SCRIPT ANSWERS  Relationship to Patient?  Self

## 2023-11-06 NOTE — TELEPHONE ENCOUNTER
Pt returned call. Advised pt to continue with getting MRI and following ordering providers recommendations and to follow up with PCP as scheduled. Informed pt that if need, can schedule her to see covering provider while PCP is out of the office.

## 2023-11-08 ENCOUNTER — TELEPHONE (OUTPATIENT)
Dept: FAMILY MEDICINE CLINIC | Age: 43
End: 2023-11-08

## 2023-11-08 NOTE — TELEPHONE ENCOUNTER
Pt said she is experiencing bad vaginal itching and believes it's a yeast infection as she has had them before.  She is wondering if she could have something called in, pt doesn't drive and isn't sure if she could make it into the office for an appointment

## 2023-11-09 ENCOUNTER — TELEPHONE (OUTPATIENT)
Dept: FAMILY MEDICINE CLINIC | Age: 43
End: 2023-11-09

## 2023-11-09 RX ORDER — FLUCONAZOLE 150 MG/1
150 TABLET ORAL ONCE
Qty: 1 TABLET | Refills: 0 | Status: SHIPPED | OUTPATIENT
Start: 2023-11-09 | End: 2023-11-09

## 2023-11-09 NOTE — TELEPHONE ENCOUNTER
Dr. Kenna Nur office (Endocrinology) calling to request PCP not restart pt's dapagliflozin (FARXIGA) 10 MG tablet due to recurrent UTI and yeast infections. Stated they have discontinued this before and medication keeps being restarted. I don't see that PCP has sent any fills for this medication. Informed Dr. Kenna Nur office I would notified PCP.

## 2023-11-09 NOTE — TELEPHONE ENCOUNTER
No chances of being pregnant. Pt stated she is just having vaginal itching, no discolored discharge or odor.

## 2023-11-10 ENCOUNTER — HOSPITAL ENCOUNTER (OUTPATIENT)
Dept: MRI IMAGING | Age: 43
Discharge: HOME | End: 2023-11-10
Payer: MEDICARE

## 2023-11-10 DIAGNOSIS — K76.9 HEPATOPATHY: ICD-10-CM

## 2023-11-10 LAB
CATARACTS: NEGATIVE
DIABETIC RETINOPATHY: NEGATIVE
GLAUCOMA: NEGATIVE
INTRAOCULAR PRESSURE EYE: 15
INTRAOCULAR PRESSURE EYE: 15
VISUAL ACUITY DISTANCE LEFT EYE: NORMAL
VISUAL ACUITY DISTANCE RIGHT EYE: NORMAL

## 2023-11-10 PROCEDURE — 74183 MRI ABD W/O CNTR FLWD CNTR: CPT

## 2023-11-10 PROCEDURE — 6360000004 HC RX CONTRAST MEDICATION: Performed by: NURSE PRACTITIONER

## 2023-11-10 PROCEDURE — A9577 INJ MULTIHANCE: HCPCS | Performed by: NURSE PRACTITIONER

## 2023-11-10 RX ADMIN — GADOBENATE DIMEGLUMINE 20 ML: 529 INJECTION, SOLUTION INTRAVENOUS at 14:15

## 2023-11-16 ENCOUNTER — OFFICE VISIT (OUTPATIENT)
Dept: FAMILY MEDICINE CLINIC | Age: 43
End: 2023-11-16
Payer: MEDICARE

## 2023-11-16 ENCOUNTER — HOSPITAL ENCOUNTER (OUTPATIENT)
Dept: MRI IMAGING | Age: 43
Discharge: HOME | End: 2023-11-16
Payer: MEDICARE

## 2023-11-16 VITALS
OXYGEN SATURATION: 97 % | HEART RATE: 106 BPM | DIASTOLIC BLOOD PRESSURE: 80 MMHG | WEIGHT: 251 LBS | SYSTOLIC BLOOD PRESSURE: 100 MMHG | BODY MASS INDEX: 47.43 KG/M2

## 2023-11-16 DIAGNOSIS — I95.9 HYPOTENSION, UNSPECIFIED HYPOTENSION TYPE: ICD-10-CM

## 2023-11-16 DIAGNOSIS — I10 HYPERTENSION, ESSENTIAL: Primary | ICD-10-CM

## 2023-11-16 PROCEDURE — G8482 FLU IMMUNIZE ORDER/ADMIN: HCPCS | Performed by: STUDENT IN AN ORGANIZED HEALTH CARE EDUCATION/TRAINING PROGRAM

## 2023-11-16 PROCEDURE — G8417 CALC BMI ABV UP PARAM F/U: HCPCS | Performed by: STUDENT IN AN ORGANIZED HEALTH CARE EDUCATION/TRAINING PROGRAM

## 2023-11-16 PROCEDURE — 3079F DIAST BP 80-89 MM HG: CPT | Performed by: STUDENT IN AN ORGANIZED HEALTH CARE EDUCATION/TRAINING PROGRAM

## 2023-11-16 PROCEDURE — 3074F SYST BP LT 130 MM HG: CPT | Performed by: STUDENT IN AN ORGANIZED HEALTH CARE EDUCATION/TRAINING PROGRAM

## 2023-11-16 PROCEDURE — 73221 MRI JOINT UPR EXTREM W/O DYE: CPT

## 2023-11-16 PROCEDURE — G8427 DOCREV CUR MEDS BY ELIG CLIN: HCPCS | Performed by: STUDENT IN AN ORGANIZED HEALTH CARE EDUCATION/TRAINING PROGRAM

## 2023-11-16 PROCEDURE — 1036F TOBACCO NON-USER: CPT | Performed by: STUDENT IN AN ORGANIZED HEALTH CARE EDUCATION/TRAINING PROGRAM

## 2023-11-16 PROCEDURE — 99214 OFFICE O/P EST MOD 30 MIN: CPT | Performed by: STUDENT IN AN ORGANIZED HEALTH CARE EDUCATION/TRAINING PROGRAM

## 2023-11-16 RX ORDER — LISINOPRIL 20 MG/1
20 TABLET ORAL DAILY
Qty: 90 TABLET | Refills: 1 | Status: SHIPPED | OUTPATIENT
Start: 2023-11-16
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
## 2023-11-18 ENCOUNTER — PATIENT MESSAGE (OUTPATIENT)
Dept: FAMILY MEDICINE CLINIC | Age: 43
End: 2023-11-18

## 2023-11-18 DIAGNOSIS — I10 HYPERTENSION, ESSENTIAL: Primary | ICD-10-CM

## 2023-11-20 ENCOUNTER — COMMUNITY OUTREACH (OUTPATIENT)
Dept: FAMILY MEDICINE CLINIC | Age: 43
End: 2023-11-20

## 2023-11-20 NOTE — TELEPHONE ENCOUNTER
From: Tremayne Rojas  To: Dr. Mcgovern Cue: 11/18/2023 9:12 AM EST  Subject: Non urgert     I have had black stool for 2 days now

## 2023-11-21 ENCOUNTER — OFFICE VISIT (OUTPATIENT)
Dept: ORTHOPEDIC SURGERY | Age: 43
End: 2023-11-21

## 2023-11-21 VITALS — HEIGHT: 61 IN | WEIGHT: 253 LBS | BODY MASS INDEX: 47.77 KG/M2

## 2023-11-21 DIAGNOSIS — S93.491A SPRAIN OF ANTERIOR TALOFIBULAR LIGAMENT OF RIGHT ANKLE, INITIAL ENCOUNTER: ICD-10-CM

## 2023-11-21 DIAGNOSIS — S93.491A SPRAIN OF ANTERIOR TALOFIBULAR LIGAMENT OF RIGHT ANKLE, INITIAL ENCOUNTER: Primary | ICD-10-CM

## 2023-11-21 DIAGNOSIS — S82.891A CLOSED FRACTURE OF RIGHT ANKLE, INITIAL ENCOUNTER: Primary | ICD-10-CM

## 2023-11-21 RX ORDER — TRAMADOL HYDROCHLORIDE 50 MG/1
50 TABLET ORAL EVERY 8 HOURS PRN
Qty: 21 TABLET | Refills: 0 | Status: SHIPPED | OUTPATIENT
Start: 2023-11-21 | End: 2023-11-28

## 2023-11-21 NOTE — PROGRESS NOTES
not on the other side. She has intact sensation to light touch in the tibial, peroneal, sural, and saphenous nerve distributions bilaterally. She has warm and well-perfused feet bilaterally. She has significant tenderness on deep palpation over the ATFL and CFL of the right ankle. She has moderate tenderness to palpation of her lateral malleolus      Imaging:  Right ankle Xrays: AP, lateral, and mortise views obtained and reviewed. Demonstrated slight cortical irregularity at the tip of the lateral malleolus. Ankle mortise in anatomic position. Impression:   Right ankle sprain  Right ankle Krause A lateral malleolus fracture. Morbid obesity  Bipolar disorder  Diabetes  GERD  Sleep apnea  Hypertension  Vitamin D deficiency      Plan:   The xray findings were reviewed with the patient. We discussed there is a slight cortical irregularity at the tip of the lateral malleolus. This can be treated nonoperatively. We discussed that she has significant tenderness along her ATFL and CFL. I believe she has a grade 1 ankle sprain. Weight bearing as tolerated. Procedures    Aircast Airlift PTTD Ankle Brace     Patient was prescribed an Aircast Airlift PTTD Brace. The right ankle will require stabilization / immobilization from this semi-rigid / rigid orthosis to improve their function. The orthosis will assist in protecting the affected area, provide functional support and facilitate healing. Patient was instructed to progress ambulation weight bearing as tolerated in the device. The patient was educated and fit by a healthcare professional with expert knowledge and specialization in brace application while under the direct supervision of the treating physician. Verbal and written instructions for the use of and application of this item were provided.    They were instructed to contact the office immediately should the brace result in increased pain, decreased sensation, increased

## 2023-11-24 ENCOUNTER — HOSPITAL ENCOUNTER (OUTPATIENT)
Age: 43
Discharge: HOME | End: 2023-11-24
Payer: MEDICARE

## 2023-11-24 DIAGNOSIS — I10 HYPERTENSION, ESSENTIAL: ICD-10-CM

## 2023-11-24 LAB
BASOPHILS # BLD: 0.1 K/UL (ref 0–0.2)
BASOPHILS NFR BLD: 1.2 %
DEPRECATED RDW RBC AUTO: 14.3 % (ref 12.4–15.4)
EOSINOPHIL # BLD: 0.2 K/UL (ref 0–0.6)
EOSINOPHIL NFR BLD: 2.3 %
HCT VFR BLD AUTO: 33.8 % (ref 36–48)
HGB BLD-MCNC: 11.4 G/DL (ref 12–16)
LYMPHOCYTES # BLD: 2.5 K/UL (ref 1–5.1)
LYMPHOCYTES NFR BLD: 31 %
MCH RBC QN AUTO: 26.9 PG (ref 26–34)
MCHC RBC AUTO-ENTMCNC: 33.7 G/DL (ref 31–36)
MCV RBC AUTO: 79.8 FL (ref 80–100)
MONOCYTES # BLD: 0.6 K/UL (ref 0–1.3)
MONOCYTES NFR BLD: 7.6 %
NEUTROPHILS # BLD: 4.7 K/UL (ref 1.7–7.7)
NEUTROPHILS NFR BLD: 57.9 %
PLATELET # BLD AUTO: 289 K/UL (ref 135–450)
PMV BLD AUTO: 7.7 FL (ref 5–10.5)
RBC # BLD AUTO: 4.24 M/UL (ref 4–5.2)
WBC # BLD AUTO: 8.1 K/UL (ref 4–11)

## 2023-11-24 PROCEDURE — 85025 COMPLETE CBC W/AUTO DIFF WBC: CPT

## 2023-11-24 PROCEDURE — 36415 COLL VENOUS BLD VENIPUNCTURE: CPT

## 2023-12-01 ENCOUNTER — PATIENT MESSAGE (OUTPATIENT)
Dept: ORTHOPEDIC SURGERY | Age: 43
End: 2023-12-01

## 2023-12-01 DIAGNOSIS — S93.491A SPRAIN OF ANTERIOR TALOFIBULAR LIGAMENT OF RIGHT ANKLE, INITIAL ENCOUNTER: Primary | ICD-10-CM

## 2023-12-01 RX ORDER — NAPROXEN 500 MG/1
500 TABLET ORAL 2 TIMES DAILY WITH MEALS
Qty: 60 TABLET | Refills: 3 | Status: SHIPPED | OUTPATIENT
Start: 2023-12-01

## 2023-12-01 NOTE — TELEPHONE ENCOUNTER
From: Taco Pepe  To: Dr. Wiley Mow: 12/1/2023 9:41 AM EST  Subject: Non urgert     Can I get another prescription of tramdol my ankle is in several pain

## 2023-12-11 ENCOUNTER — OFFICE VISIT (OUTPATIENT)
Dept: FAMILY MEDICINE CLINIC | Age: 43
End: 2023-12-11
Payer: MEDICARE

## 2023-12-11 VITALS
OXYGEN SATURATION: 96 % | SYSTOLIC BLOOD PRESSURE: 118 MMHG | WEIGHT: 256 LBS | HEART RATE: 104 BPM | HEIGHT: 61 IN | TEMPERATURE: 97.9 F | BODY MASS INDEX: 48.33 KG/M2 | DIASTOLIC BLOOD PRESSURE: 80 MMHG

## 2023-12-11 DIAGNOSIS — Z00.00 MEDICARE ANNUAL WELLNESS VISIT, SUBSEQUENT: Primary | ICD-10-CM

## 2023-12-11 DIAGNOSIS — J02.9 SORE THROAT: ICD-10-CM

## 2023-12-11 PROCEDURE — G8482 FLU IMMUNIZE ORDER/ADMIN: HCPCS | Performed by: FAMILY MEDICINE

## 2023-12-11 PROCEDURE — 3074F SYST BP LT 130 MM HG: CPT | Performed by: FAMILY MEDICINE

## 2023-12-11 PROCEDURE — G0439 PPPS, SUBSEQ VISIT: HCPCS | Performed by: FAMILY MEDICINE

## 2023-12-11 PROCEDURE — 3079F DIAST BP 80-89 MM HG: CPT | Performed by: FAMILY MEDICINE

## 2023-12-11 RX ORDER — AZITHROMYCIN 250 MG/1
250 TABLET, FILM COATED ORAL SEE ADMIN INSTRUCTIONS
Qty: 6 TABLET | Refills: 0 | Status: SHIPPED | OUTPATIENT
Start: 2023-12-11 | End: 2023-12-16

## 2023-12-11 ASSESSMENT — PATIENT HEALTH QUESTIONNAIRE - PHQ9
1. LITTLE INTEREST OR PLEASURE IN DOING THINGS: 1
4. FEELING TIRED OR HAVING LITTLE ENERGY: 1
10. IF YOU CHECKED OFF ANY PROBLEMS, HOW DIFFICULT HAVE THESE PROBLEMS MADE IT FOR YOU TO DO YOUR WORK, TAKE CARE OF THINGS AT HOME, OR GET ALONG WITH OTHER PEOPLE: 1
3. TROUBLE FALLING OR STAYING ASLEEP: 1
SUM OF ALL RESPONSES TO PHQ9 QUESTIONS 1 & 2: 2
SUM OF ALL RESPONSES TO PHQ QUESTIONS 1-9: 8
SUM OF ALL RESPONSES TO PHQ QUESTIONS 1-9: 8
6. FEELING BAD ABOUT YOURSELF - OR THAT YOU ARE A FAILURE OR HAVE LET YOURSELF OR YOUR FAMILY DOWN: 0
9. THOUGHTS THAT YOU WOULD BE BETTER OFF DEAD, OR OF HURTING YOURSELF: 0
7. TROUBLE CONCENTRATING ON THINGS, SUCH AS READING THE NEWSPAPER OR WATCHING TELEVISION: 3
SUM OF ALL RESPONSES TO PHQ QUESTIONS 1-9: 8
SUM OF ALL RESPONSES TO PHQ QUESTIONS 1-9: 8
5. POOR APPETITE OR OVEREATING: 0
8. MOVING OR SPEAKING SO SLOWLY THAT OTHER PEOPLE COULD HAVE NOTICED. OR THE OPPOSITE, BEING SO FIGETY OR RESTLESS THAT YOU HAVE BEEN MOVING AROUND A LOT MORE THAN USUAL: 1
2. FEELING DOWN, DEPRESSED OR HOPELESS: 1

## 2023-12-11 ASSESSMENT — COLUMBIA-SUICIDE SEVERITY RATING SCALE - C-SSRS
5. HAVE YOU STARTED TO WORK OUT OR WORKED OUT THE DETAILS OF HOW TO KILL YOURSELF? DO YOU INTEND TO CARRY OUT THIS PLAN?: NO
3. HAVE YOU BEEN THINKING ABOUT HOW YOU MIGHT KILL YOURSELF?: NO
7. DID THIS OCCUR IN THE LAST THREE MONTHS: NO
4. HAVE YOU HAD THESE THOUGHTS AND HAD SOME INTENTION OF ACTING ON THEM?: NO

## 2023-12-11 ASSESSMENT — LIFESTYLE VARIABLES
HOW OFTEN DO YOU HAVE A DRINK CONTAINING ALCOHOL: NEVER
HOW MANY STANDARD DRINKS CONTAINING ALCOHOL DO YOU HAVE ON A TYPICAL DAY: PATIENT DOES NOT DRINK

## 2023-12-11 NOTE — PROGRESS NOTES
Medicare Annual Wellness Visit      [address] was seen today for medicare awv and wheezing. Diagnoses and all orders for this visit:    Medicare annual wellness visit, subsequent    Sore throat  -     azithromycin (ZITHROMAX) 250 MG tablet; Take 1 tablet by mouth See Admin Instructions for 5 days 500mg on day 1 followed by 250mg on days 2 - 5    Recommendations for Preventive Services Due: see orders and patient instructions/AVS.  Recommended screening schedule for the next 5-10 years is provided to the patient in written form: see Patient Instructions/AVS.     Return in 3 months (on 3/11/2024). Subjective     The following acute and/or chronic problems were also addressed today:  Zeinab Aguilar was seen today for medicare awv and wheezing. Diagnoses and all orders for this visit:    Sore throat  -     azithromycin (ZITHROMAX) 250 MG tablet; Take 1 tablet by mouth See Admin Instructions for 5 days 500mg on day 1 followed by 250mg on days 2 - 5    Patient's complete Health Risk Assessment and screening values have been reviewed and are found in Flowsheets. The   following problems were reviewed today and where indicated follow up appointments were made and/or referrals ordered.     Positive Risk Factor Screenings with Interventions:    Fall Risk:  Do you feel unsteady or are you worried about falling? : no  2 or more falls in past year?: no  Fall with injury in past year?: (!) yes     Interventions:    See AVS for additional education material     Depression:  PHQ-2 Score: 2  PHQ-9 Total Score: 8    Interpretation:   1-4 = minimal  5-9 = mild  10-14 = moderate  15-19 = moderately severe  20-27 = severe  Interventions:  See AVS for additional education material          General HRA Questions:  Select all that apply: (!) New or Increased Pain    Pain Interventions:  See AVS for additional education material       Weight and Activity:  Physical Activity: Insufficiently Active (12/11/2023)

## 2023-12-21 LAB
ESTIMATED AVERAGE GLUCOSE: 194
HBA1C MFR BLD: 8.4 %

## 2023-12-22 LAB — MAMMOGRAPHY, EXTERNAL: NORMAL

## 2023-12-27 ENCOUNTER — TELEPHONE (OUTPATIENT)
Dept: FAMILY MEDICINE CLINIC | Age: 43
End: 2023-12-27

## 2023-12-27 ENCOUNTER — OFFICE VISIT (OUTPATIENT)
Dept: BARIATRICS/WEIGHT MGMT | Age: 43
End: 2023-12-27
Payer: MEDICARE

## 2023-12-27 VITALS — BODY MASS INDEX: 47.58 KG/M2 | HEIGHT: 61 IN | WEIGHT: 252 LBS

## 2023-12-27 DIAGNOSIS — G47.33 OSA (OBSTRUCTIVE SLEEP APNEA): ICD-10-CM

## 2023-12-27 DIAGNOSIS — I10 HYPERTENSION, ESSENTIAL: ICD-10-CM

## 2023-12-27 DIAGNOSIS — E78.2 MIXED HYPERLIPIDEMIA: ICD-10-CM

## 2023-12-27 DIAGNOSIS — K21.9 GASTROESOPHAGEAL REFLUX DISEASE WITHOUT ESOPHAGITIS: ICD-10-CM

## 2023-12-27 DIAGNOSIS — E11.65 UNCONTROLLED TYPE 2 DIABETES MELLITUS WITH HYPERGLYCEMIA (HCC): ICD-10-CM

## 2023-12-27 DIAGNOSIS — E88.810 METABOLIC SYNDROME: Primary | ICD-10-CM

## 2023-12-27 DIAGNOSIS — E66.01 MORBID OBESITY WITH BMI OF 45.0-49.9, ADULT (HCC): ICD-10-CM

## 2023-12-27 PROCEDURE — 1036F TOBACCO NON-USER: CPT | Performed by: SURGERY

## 2023-12-27 PROCEDURE — G8482 FLU IMMUNIZE ORDER/ADMIN: HCPCS | Performed by: SURGERY

## 2023-12-27 PROCEDURE — 99214 OFFICE O/P EST MOD 30 MIN: CPT | Performed by: SURGERY

## 2023-12-27 PROCEDURE — 2022F DILAT RTA XM EVC RTNOPTHY: CPT | Performed by: SURGERY

## 2023-12-27 PROCEDURE — G8417 CALC BMI ABV UP PARAM F/U: HCPCS | Performed by: SURGERY

## 2023-12-27 PROCEDURE — 3052F HG A1C>EQUAL 8.0%<EQUAL 9.0%: CPT | Performed by: SURGERY

## 2023-12-27 PROCEDURE — G8427 DOCREV CUR MEDS BY ELIG CLIN: HCPCS | Performed by: SURGERY

## 2023-12-27 NOTE — TELEPHONE ENCOUNTER
LMOM for pt to call the office back. Message left informing pt that typically a pre op appointment is needed prior to any kind of clearance and that cannot be done until we have a date. Message advising pt to give me a call back with surgeon information so I can contact their office to gain more information.

## 2023-12-27 NOTE — TELEPHONE ENCOUNTER
Called back and left a voicemail to have her call us when she gets more information as to when it will be, who is doing the surgery, where is it at, what anesthesia will she be on and so fourth. Informed her we cannot write letter until pre-op is completed with our office and we cannot scheduled pre-op  until there is a surgery date in voicemail as well.        Included our office number to call back

## 2023-12-27 NOTE — TELEPHONE ENCOUNTER
Pt came in office stating she will be having weight loss surgery and is not aware of the date yet but her Dr doing the procedure is stating the pt needs a clearance letter. Please advise pt of next steps.      [phone] (home)

## 2023-12-27 NOTE — PROGRESS NOTES
Marie Benitez gained 6 lbs over 8 weeks. Pt trying to drink more water and gatorade zero to replace sugary drinks like pop and sweet tea. Pt trying to watch what she's eating. DM2    Breakfast: HB egg and toast    Snack: None    Lunch: Sometimes skipping lunch OR 1/2 sandwich - mean and cheese    Snack: None    Dinner: veggies (carrots/peas/green beans) + protein    Snack: None    Is pt consuming smaller portions? yes , feels she is doing well with this    Is pt consuming at least 64 oz of fluids per day? yes , she is    Is pt consuming carbonated, caffeinated, or sugary beverages? no    Has pt sampled Unjury and/or Nectar protein? None    Has patient attended a support group?  Not scheduled     Exercise: A lot of walking    Plan/Recommendations:   Protein based snacks and no skipping meals  Consistency with fluids  Support group and protein powder    Handouts: None    Karla Watson RD, LD

## 2023-12-28 ENCOUNTER — PATIENT MESSAGE (OUTPATIENT)
Dept: FAMILY MEDICINE CLINIC | Age: 43
End: 2023-12-28

## 2023-12-28 DIAGNOSIS — G43.009 MIGRAINE WITHOUT AURA AND WITHOUT STATUS MIGRAINOSUS, NOT INTRACTABLE: Primary | ICD-10-CM

## 2023-12-28 NOTE — TELEPHONE ENCOUNTER
From: Leonides Escalante  To: Dr. Abdi Warner  Sent: 12/28/2023 12:08 PM EST  Subject: Non urgert    Can you send a referral over to a nerogurly for my migraines I was going to [facility] for years but my doctor is no longer at the practice anymore

## 2024-01-17 ENCOUNTER — PATIENT MESSAGE (OUTPATIENT)
Dept: FAMILY MEDICINE CLINIC | Age: 44
End: 2024-01-17

## 2024-01-17 DIAGNOSIS — E11.65 UNCONTROLLED TYPE 2 DIABETES MELLITUS WITH HYPERGLYCEMIA (HCC): Primary | ICD-10-CM

## 2024-01-17 NOTE — TELEPHONE ENCOUNTER
From: Leonides Escalante  To: Dr. Abdi Warner  Sent: 1/17/2024 4:08 PM EST  Subject: Non urgert     Can you senda referral over to [facility]

## 2024-04-04 DIAGNOSIS — E78.2 MIXED HYPERLIPIDEMIA: ICD-10-CM

## 2024-04-04 RX ORDER — FENOFIBRATE 48 MG/1
48 TABLET, COATED ORAL DAILY
Qty: 90 TABLET | Refills: 1 | OUTPATIENT
Start: 2024-04-04

## 2024-04-15 RX ORDER — LISINOPRIL AND HYDROCHLOROTHIAZIDE 20; 12.5 MG/1; MG/1
1 TABLET ORAL DAILY
Qty: 90 TABLET | OUTPATIENT
Start: 2024-04-15

## 2024-09-07 LAB
ESTIMATED AVERAGE GLUCOSE: 212
HBA1C MFR BLD: 9 %

## 2024-11-18 LAB
ESTIMATED AVERAGE GLUCOSE: NORMAL
HBA1C MFR BLD: 8.9 %

## 2024-12-30 LAB
ESTIMATED AVERAGE GLUCOSE: 206
HBA1C MFR BLD: 8.8 %

## 2025-01-23 ENCOUNTER — COMMUNITY OUTREACH (OUTPATIENT)
Dept: FAMILY MEDICINE CLINIC | Age: 45
End: 2025-01-23

## 2025-01-23 NOTE — PROGRESS NOTES
Patient's HM shows they are overdue for Hemoglobin A1C  Care Everywhere and  files searched.   updated with 11/18/24, 9/7/24 and 12/21/23 A1C results .

## 2025-02-14 DIAGNOSIS — M54.9 UPPER BACK PAIN: ICD-10-CM

## 2025-02-14 RX ORDER — CYCLOBENZAPRINE HCL 10 MG
10 TABLET ORAL 3 TIMES DAILY PRN
Qty: 30 TABLET | Refills: 0 | OUTPATIENT
Start: 2025-02-14

## 2025-02-14 NOTE — TELEPHONE ENCOUNTER
Medication:   Requested Prescriptions     Pending Prescriptions Disp Refills    cyclobenzaprine (FLEXERIL) 10 MG tablet [Pharmacy Med Name: CYCLOBENZAPRINE 10MG TABLETS] 30 tablet 0     Sig: TAKE 1 TABLET BY MOUTH THREE TIMES DAILY AS NEEDED FOR MUSCLE SPASMS        Last Filled:      Patient Phone Number: [phone] (home)     Last appt: 2/8/2023   Next appt: Visit date not found    Last OARRS:       9/13/2019    12:47 PM   RX Monitoring   Periodic Controlled Substance Monitoring Possible medication side effects, risk of tolerance/dependence & alternative treatments discussed.;No signs of potential drug abuse or diversion identified.       Wrong office

## 2025-02-23 ENCOUNTER — OFFICE VISIT (OUTPATIENT)
Age: 45
End: 2025-02-23

## 2025-02-23 VITALS
OXYGEN SATURATION: 96 % | TEMPERATURE: 98.4 F | WEIGHT: 266.8 LBS | SYSTOLIC BLOOD PRESSURE: 110 MMHG | DIASTOLIC BLOOD PRESSURE: 74 MMHG | BODY MASS INDEX: 50.37 KG/M2 | HEART RATE: 113 BPM | HEIGHT: 61 IN

## 2025-02-23 DIAGNOSIS — J45.21 MILD INTERMITTENT ASTHMATIC BRONCHITIS WITH ACUTE EXACERBATION: Primary | ICD-10-CM

## 2025-02-23 LAB
INFLUENZA A ANTIGEN, POC: NEGATIVE
INFLUENZA B ANTIGEN, POC: NEGATIVE

## 2025-02-23 RX ORDER — PREDNISONE 20 MG/1
20 TABLET ORAL 2 TIMES DAILY
Qty: 10 TABLET | Refills: 0 | Status: SHIPPED | OUTPATIENT
Start: 2025-02-23 | End: 2025-02-28

## 2025-02-23 RX ORDER — DOXYCYCLINE HYCLATE 100 MG
100 TABLET ORAL 2 TIMES DAILY
Qty: 14 TABLET | Refills: 0 | Status: SHIPPED | OUTPATIENT
Start: 2025-02-23 | End: 2025-03-02

## 2025-02-23 RX ORDER — ALBUTEROL SULFATE 90 UG/1
2 INHALANT RESPIRATORY (INHALATION) 4 TIMES DAILY PRN
Qty: 18 G | Refills: 0 | Status: SHIPPED | OUTPATIENT
Start: 2025-02-23

## 2025-03-17 DIAGNOSIS — J45.21 MILD INTERMITTENT ASTHMATIC BRONCHITIS WITH ACUTE EXACERBATION: ICD-10-CM

## 2025-04-06 RX ORDER — ALBUTEROL SULFATE 90 UG/1
INHALANT RESPIRATORY (INHALATION)
Qty: 18 G | Refills: 0 | Status: SHIPPED | OUTPATIENT
Start: 2025-04-06

## 2025-04-12 ENCOUNTER — HOSPITAL ENCOUNTER (EMERGENCY)
Age: 45
Discharge: HOME | End: 2025-04-12
Payer: MEDICARE

## 2025-04-12 ENCOUNTER — APPOINTMENT (OUTPATIENT)
Dept: CT IMAGING | Age: 45
End: 2025-04-12
Payer: MEDICARE

## 2025-04-12 VITALS
WEIGHT: 269 LBS | HEART RATE: 93 BPM | OXYGEN SATURATION: 96 % | BODY MASS INDEX: 50.83 KG/M2 | TEMPERATURE: 97.9 F | RESPIRATION RATE: 18 BRPM | SYSTOLIC BLOOD PRESSURE: 130 MMHG | DIASTOLIC BLOOD PRESSURE: 78 MMHG

## 2025-04-12 DIAGNOSIS — K59.00 CONSTIPATION, UNSPECIFIED CONSTIPATION TYPE: ICD-10-CM

## 2025-04-12 DIAGNOSIS — R10.84 GENERALIZED ABDOMINAL PAIN: Primary | ICD-10-CM

## 2025-04-12 LAB
ABO/RH: NORMAL
ALBUMIN SERPL-MCNC: 4.4 G/DL (ref 3.4–5)
ALBUMIN/GLOB SERPL: 1.3 {RATIO} (ref 1.1–2.2)
ALP SERPL-CCNC: 89 U/L (ref 40–129)
ALT SERPL-CCNC: 20 U/L (ref 10–40)
ANION GAP SERPL CALCULATED.3IONS-SCNC: 10 MMOL/L (ref 3–16)
ANTIBODY SCREEN: NORMAL
APTT BLD: 32.7 SEC (ref 22.1–36.4)
AST SERPL-CCNC: 21 U/L (ref 15–37)
BASOPHILS # BLD: 0.1 K/UL (ref 0–0.2)
BASOPHILS NFR BLD: 1.4 %
BILIRUB SERPL-MCNC: 0.4 MG/DL (ref 0–1)
BUN SERPL-MCNC: 24 MG/DL (ref 7–20)
CALCIUM SERPL-MCNC: 9.5 MG/DL (ref 8.3–10.6)
CHLORIDE SERPL-SCNC: 95 MMOL/L (ref 99–110)
CO2 SERPL-SCNC: 30 MMOL/L (ref 21–32)
CREAT SERPL-MCNC: 1.2 MG/DL (ref 0.6–1.1)
DEPRECATED RDW RBC AUTO: 15.2 % (ref 12.4–15.4)
EOSINOPHIL # BLD: 0.1 K/UL (ref 0–0.6)
EOSINOPHIL NFR BLD: 1.1 %
GFR SERPLBLD CREATININE-BSD FMLA CKD-EPI: 57 ML/MIN/{1.73_M2}
GLUCOSE SERPL-MCNC: 191 MG/DL (ref 70–99)
HCG SERPL QL: NEGATIVE
HCT VFR BLD AUTO: 34.6 % (ref 36–48)
HEMOCCULT STL QL: NORMAL
HGB BLD-MCNC: 11.5 G/DL (ref 12–16)
INR PPP: 1.16 (ref 0.85–1.15)
LIPASE SERPL-CCNC: 33 U/L (ref 13–60)
LYMPHOCYTES # BLD: 2.4 K/UL (ref 1–5.1)
LYMPHOCYTES NFR BLD: 23.3 %
MCH RBC QN AUTO: 28.1 PG (ref 26–34)
MCHC RBC AUTO-ENTMCNC: 33.2 G/DL (ref 31–36)
MCV RBC AUTO: 84.7 FL (ref 80–100)
MONOCYTES # BLD: 0.6 K/UL (ref 0–1.3)
MONOCYTES NFR BLD: 5.6 %
NEUTROPHILS # BLD: 7 K/UL (ref 1.7–7.7)
NEUTROPHILS NFR BLD: 68.6 %
PLATELET # BLD AUTO: 349 K/UL (ref 135–450)
PMV BLD AUTO: 6.8 FL (ref 5–10.5)
POTASSIUM SERPL-SCNC: 4.7 MMOL/L (ref 3.5–5.1)
PROT SERPL-MCNC: 7.9 G/DL (ref 6.4–8.2)
PROTHROMBIN TIME: 15 SEC (ref 11.9–14.9)
RBC # BLD AUTO: 4.08 M/UL (ref 4–5.2)
SODIUM SERPL-SCNC: 135 MMOL/L (ref 136–145)
WBC # BLD AUTO: 10.2 K/UL (ref 4–11)

## 2025-04-12 PROCEDURE — 74177 CT ABD & PELVIS W/CONTRAST: CPT

## 2025-04-12 PROCEDURE — 6360000004 HC RX CONTRAST MEDICATION: Performed by: PHYSICIAN ASSISTANT

## 2025-04-12 PROCEDURE — 2580000003 HC RX 258: Performed by: PHYSICIAN ASSISTANT

## 2025-04-12 PROCEDURE — 82270 OCCULT BLOOD FECES: CPT

## 2025-04-12 PROCEDURE — 80053 COMPREHEN METABOLIC PANEL: CPT

## 2025-04-12 PROCEDURE — 84703 CHORIONIC GONADOTROPIN ASSAY: CPT

## 2025-04-12 PROCEDURE — 85730 THROMBOPLASTIN TIME PARTIAL: CPT

## 2025-04-12 PROCEDURE — 86900 BLOOD TYPING SEROLOGIC ABO: CPT

## 2025-04-12 PROCEDURE — 85610 PROTHROMBIN TIME: CPT

## 2025-04-12 PROCEDURE — 86901 BLOOD TYPING SEROLOGIC RH(D): CPT

## 2025-04-12 PROCEDURE — 99285 EMERGENCY DEPT VISIT HI MDM: CPT

## 2025-04-12 PROCEDURE — 85025 COMPLETE CBC W/AUTO DIFF WBC: CPT

## 2025-04-12 PROCEDURE — 86850 RBC ANTIBODY SCREEN: CPT

## 2025-04-12 PROCEDURE — 83690 ASSAY OF LIPASE: CPT

## 2025-04-12 RX ORDER — IOPAMIDOL 755 MG/ML
75 INJECTION, SOLUTION INTRAVASCULAR
Status: DISCONTINUED | OUTPATIENT
Start: 2025-04-12 | End: 2025-04-12 | Stop reason: HOSPADM

## 2025-04-12 RX ORDER — 0.9 % SODIUM CHLORIDE 0.9 %
1000 INTRAVENOUS SOLUTION INTRAVENOUS ONCE
Status: COMPLETED | OUTPATIENT
Start: 2025-04-12 | End: 2025-04-12

## 2025-04-12 RX ADMIN — IOPAMIDOL 75 ML: 755 INJECTION, SOLUTION INTRAVENOUS at 10:22

## 2025-04-12 RX ADMIN — SODIUM CHLORIDE 1000 ML: 0.9 INJECTION, SOLUTION INTRAVENOUS at 09:54

## 2025-04-12 ASSESSMENT — PAIN - FUNCTIONAL ASSESSMENT: PAIN_FUNCTIONAL_ASSESSMENT: 0-10

## 2025-04-12 ASSESSMENT — PAIN DESCRIPTION - LOCATION: LOCATION: ABDOMEN

## 2025-04-12 ASSESSMENT — PAIN DESCRIPTION - PAIN TYPE: TYPE: ACUTE PAIN

## 2025-04-12 ASSESSMENT — PAIN SCALES - GENERAL: PAINLEVEL_OUTOF10: 8

## 2025-04-12 NOTE — ED PROVIDER NOTES
[facility] EMERGENCY DEPARTMENT  EMERGENCY DEPARTMENT ENCOUNTER        Pt Name: Leonides Escalante  MRN: [id number]  Birthdate 1980  Date of evaluation: 4/12/2025  Provider: Cheikh Valentino PA-C  PCP: Abdi Warner MD  Note Started: 10:23 AM EDT 4/12/25      LOYD. I have evaluated this patient.        CHIEF COMPLAINT       Chief Complaint   Patient presents with    Abdominal Pain    Back Pain    Rectal Bleeding     Pt presents to the ER with the report of abd pain, lower back pain and blood in her stool. Pt states she started having symptoms 5 days ago. Pt states it has been one week since her last bowel movement.        HISTORY OF PRESENT ILLNESS: 1 or more Elements     History From: patient  Limitations to history : None    Leonides Escalante is a 45 y.o. female who presents to the emergency department with a chief complaint of some abdominal pain and back pain with constipation.  She states her last bowel movement was a week ago.  Had some blood with her bowel movement at that time.  Has had no further bleeding since then.  Has 8 out of 10 and low back and abdominal pain.  Had some nausea and vomiting yesterday.  Denies diarrhea.  Is still been passing flatus.  Denies dysuria, hematuria, abnormal vaginal bleeding or discharge.  She is anticoagulated on Xarelto with history of DVT.  Denies any aggravating leaving factors.  Denies any history of abdominal surgeries.  States she has tried MiraLAX and stool softeners at home and has been unable to have a bowel movement.  Does not take any chronic pain medication and denies any diet change recently.    Nursing Notes were all reviewed and agreed with or any disagreements were addressed in the HPI.    REVIEW OF SYSTEMS :      Review of Systems    Positives and Pertinent negatives as per HPI.     SURGICAL HISTORY     Past Surgical History:   Procedure Laterality Date    BREAST REDUCTION SURGERY  05/2007    [facility]    ENDOMETRIAL ABLATION      KNEE    Tirzepatide (MOUNJARO) 2.5 MG/0.5ML SOPN SC injection Inject 0.5 mLs into the skin once a weekHistorical Med      fenofibrate (TRICOR) 48 MG tablet Take 1 tablet by mouth daily, Disp-90 tablet, R-1Normal      Continuous Blood Gluc  (FREESTYLE RAYMOND 2 READER) ANDREW 1 Device by Does not apply route daily, Disp-1 each, R-0Normal      Continuous Blood Gluc Sensor (FREESTYLE RAYMOND 2 SENSOR) MISC Change sensor every 14 days, Disp-2 each, R-5Normal      insulin lispro, 1 Unit Dial, (HUMALOG KWIKPEN) 100 UNIT/ML SOPN Inject 13 Units into the skin 3 times daily (before meals) Hold if glucose less than 110, Disp-5 Adjustable Dose Pre-filled Pen Syringe, R-2Normal      insulin glargine (LANTUS SOLOSTAR) 100 UNIT/ML injection pen 55 units in the morning and 45 units in the evening, Disp-72 mL, R-5Normal      Insulin Pen Needle 31G X 5 MM MISC DAILY Starting Wed 4/26/2023, Disp-100 each, R-3, Normal      fluticasone (FLONASE) 50 MCG/ACT nasal spray INSERT 1 SPRAY INTO EACH NOSTRIL DAILY AS NEEDED. AVOID USING FOR MORE THAN 1 WEEK, Disp-16 g, R-1Normal      ondansetron (ZOFRAN ODT) 4 MG disintegrating tablet Take 1 tablet by mouth every 8 hours as needed for Nausea, Disp-20 tablet, R-0Normal      pantoprazole (PROTONIX) 40 MG tablet Take 1 tablet by mouth every morning (before breakfast), Disp-30 tablet,R-5Print             ALLERGIES     Benztropine, Dulaglutide, Fluoxetine, Glipizide, Metformin, Sertraline, Farxiga [dapagliflozin], Oxycodone-acetaminophen, Sertraline hcl, and Tape [adhesive tape]    FAMILYHISTORY       Family History   Problem Relation Age of Onset    Depression Mother     Diabetes Mother     High Blood Pressure Mother     Stroke Mother     Other Father         ALS    Alcohol Abuse Father     Amyotrophic lateral sclerosis Father     Asthma Sister     Diabetes Sister     Stroke Sister     Seizures Sister     Depression Sister     Sleep Apnea Sister     Mental Illness Sister     Diabetes Brother

## 2025-04-12 NOTE — DISCHARGE INSTR - COC
Continuity of Care Form    Patient Name: Leonides Escalante   :  1980  MRN:  [id number]    Admit date:  2025  Discharge date:  ***    Code Status Order: Prior   Advance Directives:     Admitting Physician:  No admitting provider for patient encounter.  PCP: Abdi Warner MD    Discharging Nurse: ***  Discharging Hospital Unit/Room#: ED-0012/12  Discharging Unit Phone Number: ***    Emergency Contact:   Extended Emergency Contact Information  Primary Emergency Contact: Toyin Cortes   [facility]  Home Phone: [phone]  Mobile Phone: [phone]  Relation: Brother/Sister    Past Surgical History:  Past Surgical History:   Procedure Laterality Date    BREAST REDUCTION SURGERY  2007    [place]    ENDOMETRIAL ABLATION      KNEE ARTHROSCOPY      LUMBAR SPINE SURGERY Bilateral 2019    BILATERAL L5 TRANSFORAMINAL EPIDURAL STEROID INJECTION WITH FLUOROSCOPY performed by Susy Villasenor MD at [facility]    LUMBAR SPINE SURGERY Left 2019    LEFT L5 AND S1 LUMBAR TRANSFORAMINAL EPIDURAL STEROID INJECTION WITH FLUOROSCOPY performed by Susy Villasenor MD at [facility]    UPPER GASTROINTESTINAL ENDOSCOPY N/A 2020    EGD BIOPSY performed by Haris Jamison MD at [facility] ENDOSCOPY    WISDOM TOOTH EXTRACTION      all 4 removed       Immunization History:   Immunization History   Administered Date(s) Administered    COVID-19, PFIZER Bivalent, DO NOT Dilute, (age 12y+), IM, 30 mcg/0.3 mL 2022    COVID-19, PFIZER PURPLE top, DILUTE for use, (age 12 y+), 30mcg/0.3mL 2021, 2021, 2022    COVID-19, PFIZER, , (age 12y+), IM, 30mcg/0.3mL 10/01/2023    Hep B, ENGERIX-B, (age 20y+), IM, 1mL 2019, 10/17/2019    Hep B, ENGERIX-B, RECOMBIVAX-HB, (age Birth - 19y), IM, 0.5mL 12/10/2021    Influenza 10/02/2012    Influenza Virus Vaccine 2020, 10/13/2021, 2022    Influenza, FLUARIX, FLULAVAL, FLUZONE (age 6 mo+) and AFLURIA, (age 3 y+), Quadv PF,  ***    Readmission Risk Assessment Score:  [facility] RISK OF UNPLANNED READMISSION 2.0             0 Total Score        Discharging to Facility/ Agency   Name:   Address:  Phone:  Fax:    Dialysis Facility (if applicable)   Name:  Address:  Dialysis Schedule:  Phone:  Fax:    / signature: {Esignature:904494878}    PHYSICIAN SECTION    Prognosis: {Prognosis:3601909082}    Condition at Discharge: { Patient Condition:757431772}    Rehab Potential (if transferring to Rehab): {Prognosis:9609876624}    Recommended Labs or Other Treatments After Discharge: ***    Physician Certification: I certify the above information and transfer of Leonides Escalante  is necessary for the continuing treatment of the diagnosis listed and that she requires {Admit to Appropriate Level of Care:37623} for {GREATER/LESS:100429458} 30 days.     Update Admission H&P: {CHP DME Changes in HandP:125635618}    PHYSICIAN SIGNATURE:  {Esignature:945797076}

## 2025-04-12 NOTE — DISCHARGE INSTRUCTIONS
Can use magnesium citrate and fleets enemas over-the-counter at home to help with your constipation.

## 2025-05-11 ENCOUNTER — HOSPITAL ENCOUNTER (EMERGENCY)
Age: 45
Discharge: HOME | End: 2025-05-11
Attending: EMERGENCY MEDICINE
Payer: MEDICARE

## 2025-05-11 ENCOUNTER — APPOINTMENT (OUTPATIENT)
Dept: CT IMAGING | Age: 45
End: 2025-05-11
Payer: MEDICARE

## 2025-05-11 VITALS
SYSTOLIC BLOOD PRESSURE: 101 MMHG | DIASTOLIC BLOOD PRESSURE: 66 MMHG | HEART RATE: 108 BPM | OXYGEN SATURATION: 94 % | TEMPERATURE: 98.2 F | BODY MASS INDEX: 52.15 KG/M2 | WEIGHT: 276.24 LBS | HEIGHT: 61 IN | RESPIRATION RATE: 16 BRPM

## 2025-05-11 DIAGNOSIS — R06.02 SHORTNESS OF BREATH: Primary | ICD-10-CM

## 2025-05-11 DIAGNOSIS — R73.9 HYPERGLYCEMIA: ICD-10-CM

## 2025-05-11 DIAGNOSIS — R42 LIGHTHEADEDNESS: ICD-10-CM

## 2025-05-11 LAB
ALBUMIN SERPL-MCNC: 3.8 G/DL (ref 3.4–5)
ALBUMIN/GLOB SERPL: 1.1 {RATIO} (ref 1.1–2.2)
ALP SERPL-CCNC: 86 U/L (ref 40–129)
ALT SERPL-CCNC: 24 U/L (ref 10–40)
ANION GAP SERPL CALCULATED.3IONS-SCNC: 12 MMOL/L (ref 3–16)
AST SERPL-CCNC: 29 U/L (ref 15–37)
BASE EXCESS BLDV CALC-SCNC: 4.2 MMOL/L
BASOPHILS # BLD: 0.1 K/UL (ref 0–0.2)
BASOPHILS NFR BLD: 1.1 %
BILIRUB SERPL-MCNC: <0.2 MG/DL (ref 0–1)
BUN SERPL-MCNC: 20 MG/DL (ref 7–20)
CALCIUM SERPL-MCNC: 9.2 MG/DL (ref 8.3–10.6)
CHLORIDE SERPL-SCNC: 97 MMOL/L (ref 99–110)
CO2 BLDV-SCNC: 32 MMOL/L
CO2 SERPL-SCNC: 25 MMOL/L (ref 21–32)
COHGB MFR BLDV: 1.7 %
CREAT SERPL-MCNC: 1.1 MG/DL (ref 0.6–1.1)
DEPRECATED RDW RBC AUTO: 15.8 % (ref 12.4–15.4)
EOSINOPHIL # BLD: 0.2 K/UL (ref 0–0.6)
EOSINOPHIL NFR BLD: 1.7 %
GFR SERPLBLD CREATININE-BSD FMLA CKD-EPI: 63 ML/MIN/{1.73_M2}
GLUCOSE BLD-MCNC: 203 MG/DL (ref 70–99)
GLUCOSE SERPL-MCNC: 207 MG/DL (ref 70–99)
HCO3 BLDV-SCNC: 30 MMOL/L (ref 23–29)
HCT VFR BLD AUTO: 31.5 % (ref 36–48)
HGB BLD-MCNC: 10.3 G/DL (ref 12–16)
LYMPHOCYTES # BLD: 2.2 K/UL (ref 1–5.1)
LYMPHOCYTES NFR BLD: 19 %
MCH RBC QN AUTO: 27.8 PG (ref 26–34)
MCHC RBC AUTO-ENTMCNC: 32.7 G/DL (ref 31–36)
MCV RBC AUTO: 85.1 FL (ref 80–100)
METHGB MFR BLDV: 0.7 %
MONOCYTES # BLD: 0.8 K/UL (ref 0–1.3)
MONOCYTES NFR BLD: 7.1 %
NEUTROPHILS # BLD: 8.2 K/UL (ref 1.7–7.7)
NEUTROPHILS NFR BLD: 71.1 %
NT-PROBNP SERPL-MCNC: 78 PG/ML (ref 0–124)
O2 THERAPY: ABNORMAL
PCO2 BLDV: 49.7 MMHG (ref 40–50)
PERFORMED ON: ABNORMAL
PH BLDV: 7.39 [PH] (ref 7.35–7.45)
PLATELET # BLD AUTO: 293 K/UL (ref 135–450)
PMV BLD AUTO: 6.8 FL (ref 5–10.5)
PO2 BLDV: 47 MMHG
POTASSIUM SERPL-SCNC: 4 MMOL/L (ref 3.5–5.1)
PROT SERPL-MCNC: 7.2 G/DL (ref 6.4–8.2)
RBC # BLD AUTO: 3.7 M/UL (ref 4–5.2)
SAO2 % BLDV: 81 %
SODIUM SERPL-SCNC: 134 MMOL/L (ref 136–145)
TROPONIN, HIGH SENSITIVITY: <6 NG/L (ref 0–14)
WBC # BLD AUTO: 11.6 K/UL (ref 4–11)

## 2025-05-11 PROCEDURE — 82803 BLOOD GASES ANY COMBINATION: CPT

## 2025-05-11 PROCEDURE — 6370000000 HC RX 637 (ALT 250 FOR IP): Performed by: EMERGENCY MEDICINE

## 2025-05-11 PROCEDURE — 80053 COMPREHEN METABOLIC PANEL: CPT

## 2025-05-11 PROCEDURE — 71260 CT THORAX DX C+: CPT

## 2025-05-11 PROCEDURE — 83880 ASSAY OF NATRIURETIC PEPTIDE: CPT

## 2025-05-11 PROCEDURE — 93005 ELECTROCARDIOGRAM TRACING: CPT | Performed by: EMERGENCY MEDICINE

## 2025-05-11 PROCEDURE — 6360000004 HC RX CONTRAST MEDICATION: Performed by: EMERGENCY MEDICINE

## 2025-05-11 PROCEDURE — 85025 COMPLETE CBC W/AUTO DIFF WBC: CPT

## 2025-05-11 PROCEDURE — 99285 EMERGENCY DEPT VISIT HI MDM: CPT

## 2025-05-11 PROCEDURE — 84484 ASSAY OF TROPONIN QUANT: CPT

## 2025-05-11 RX ORDER — ACETAMINOPHEN 500 MG
1000 TABLET ORAL ONCE
Status: COMPLETED | OUTPATIENT
Start: 2025-05-11 | End: 2025-05-11

## 2025-05-11 RX ORDER — IOPAMIDOL 755 MG/ML
75 INJECTION, SOLUTION INTRAVASCULAR
Status: COMPLETED | OUTPATIENT
Start: 2025-05-11 | End: 2025-05-11

## 2025-05-11 RX ADMIN — ACETAMINOPHEN 1000 MG: 500 TABLET ORAL at 20:40

## 2025-05-11 RX ADMIN — IOPAMIDOL 150 ML: 755 INJECTION, SOLUTION INTRAVENOUS at 19:59

## 2025-05-11 ASSESSMENT — PAIN DESCRIPTION - ORIENTATION: ORIENTATION: RIGHT

## 2025-05-11 ASSESSMENT — PAIN DESCRIPTION - LOCATION
LOCATION: HEAD
LOCATION: CHEST

## 2025-05-11 ASSESSMENT — PAIN SCALES - GENERAL
PAINLEVEL_OUTOF10: 6
PAINLEVEL_OUTOF10: 6

## 2025-05-11 ASSESSMENT — PAIN DESCRIPTION - DESCRIPTORS: DESCRIPTORS: THROBBING

## 2025-05-11 ASSESSMENT — PAIN - FUNCTIONAL ASSESSMENT: PAIN_FUNCTIONAL_ASSESSMENT: 0-10
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
## 2025-05-11 NOTE — ED TRIAGE NOTES
Pt brought in by EMS from home. Pt started feeling short of breath and dizzy for the last 2 days. Pt states that she has been feeling short of breath for a while, but it has been getting worse. Pt states she found out she has a blood clot in her left leg a couple of weeks ago. Pt is on blood thinners.

## 2025-05-12 LAB
EKG ATRIAL RATE: 108 BPM
EKG DIAGNOSIS: NORMAL
EKG P AXIS: 46 DEGREES
EKG P-R INTERVAL: 132 MS
EKG Q-T INTERVAL: 362 MS
EKG QRS DURATION: 88 MS
EKG QTC CALCULATION (BAZETT): 485 MS
EKG R AXIS: 1 DEGREES
EKG T AXIS: 18 DEGREES
EKG VENTRICULAR RATE: 108 BPM

## 2025-05-12 PROCEDURE — 93010 ELECTROCARDIOGRAM REPORT: CPT | Performed by: INTERNAL MEDICINE

## 2025-05-18 ENCOUNTER — HOSPITAL ENCOUNTER (EMERGENCY)
Age: 45
Discharge: HOME | End: 2025-05-18
Payer: MEDICARE

## 2025-05-18 VITALS
RESPIRATION RATE: 18 BRPM | HEIGHT: 61 IN | BODY MASS INDEX: 51.9 KG/M2 | OXYGEN SATURATION: 95 % | DIASTOLIC BLOOD PRESSURE: 76 MMHG | TEMPERATURE: 97.9 F | SYSTOLIC BLOOD PRESSURE: 114 MMHG | WEIGHT: 274.9 LBS | HEART RATE: 94 BPM

## 2025-05-18 DIAGNOSIS — M79.89 PAIN AND SWELLING OF RIGHT LOWER EXTREMITY: Primary | ICD-10-CM

## 2025-05-18 DIAGNOSIS — M79.604 PAIN AND SWELLING OF RIGHT LOWER EXTREMITY: Primary | ICD-10-CM

## 2025-05-18 PROCEDURE — 99283 EMERGENCY DEPT VISIT LOW MDM: CPT

## 2025-05-18 ASSESSMENT — PAIN DESCRIPTION - ORIENTATION: ORIENTATION: RIGHT

## 2025-05-18 ASSESSMENT — PAIN - FUNCTIONAL ASSESSMENT: PAIN_FUNCTIONAL_ASSESSMENT: 0-10

## 2025-05-18 ASSESSMENT — PAIN SCALES - GENERAL: PAINLEVEL_OUTOF10: 9

## 2025-05-18 ASSESSMENT — PAIN DESCRIPTION - LOCATION: LOCATION: LEG

## 2025-05-18 ASSESSMENT — PAIN DESCRIPTION - DESCRIPTORS: DESCRIPTORS: ACHING;THROBBING

## 2025-05-19 NOTE — ED PROVIDER NOTES
[facility] EMERGENCY DEPARTMENT  EMERGENCY DEPARTMENT ENCOUNTER        Pt Name: Leonides Escalante  MRN: [id number]  Birthdate 1980  Date of evaluation: 5/18/2025  Provider: Lalo Pugh PA-C  PCP: Abdi Warner MD  Note Started: 11:01 PM EDT 5/18/25      LOYD. I have evaluated this patient.        CHIEF COMPLAINT       Chief Complaint   Patient presents with    Leg Pain     In right calf. Has tried ice and a heating pad today with no relief. Has hx of blood clot       HISTORY OF PRESENT ILLNESS: 1 or more Elements     History From: Patient    Limitations to history : None    Social Determinants Significantly Affecting Health : None    Chief Complaint: Pain and swelling of right lower extremity    Leonides Escalante is a 45 y.o. female with a history of hypertension, hyperlipidemia, diabetes, morbid obesity and DVT with chronic anticoagulation with Xarelto who presents to the emergency department today stating for the last 1 to 2 weeks she has had increasing pain and swelling within her right calf.  She is concerned she has another DVT.  She states she had an unprovoked DVT in her right calf in July of last year and was started on Xarelto.  She states she ran out of Xarelto last week but did restart it on Friday.  She denies redness or warmth of the right lower extremity.  She denies having any chest pain, shortness of breath, heart palpitations or diaphoresis        Nursing Notes were all reviewed and agreed with or any disagreements were addressed in the HPI.    REVIEW OF SYSTEMS :      Review of Systems   Constitutional:  Negative for chills and fever.   Respiratory:  Negative for cough, chest tightness and shortness of breath.    Cardiovascular:  Positive for leg swelling. Negative for chest pain and palpitations.   Gastrointestinal:  Negative for abdominal pain, diarrhea, nausea and vomiting.   Genitourinary:  Negative for difficulty urinating, dysuria, frequency, hematuria and urgency.

## 2025-05-31 ENCOUNTER — APPOINTMENT (OUTPATIENT)
Dept: GENERAL RADIOLOGY | Age: 45
End: 2025-05-31
Payer: MEDICARE

## 2025-05-31 ENCOUNTER — HOSPITAL ENCOUNTER (EMERGENCY)
Age: 45
Discharge: HOME | End: 2025-05-31
Attending: EMERGENCY MEDICINE
Payer: MEDICARE

## 2025-05-31 VITALS
DIASTOLIC BLOOD PRESSURE: 82 MMHG | HEART RATE: 100 BPM | WEIGHT: 269.1 LBS | OXYGEN SATURATION: 94 % | RESPIRATION RATE: 14 BRPM | BODY MASS INDEX: 50.81 KG/M2 | SYSTOLIC BLOOD PRESSURE: 140 MMHG | TEMPERATURE: 98 F | HEIGHT: 61 IN

## 2025-05-31 DIAGNOSIS — M79.661 RIGHT CALF PAIN: Primary | ICD-10-CM

## 2025-05-31 DIAGNOSIS — R93.6 ABNORMAL X-RAY OF KNEE: ICD-10-CM

## 2025-05-31 PROCEDURE — 73562 X-RAY EXAM OF KNEE 3: CPT

## 2025-05-31 PROCEDURE — 6360000002 HC RX W HCPCS: Performed by: EMERGENCY MEDICINE

## 2025-05-31 PROCEDURE — 6370000000 HC RX 637 (ALT 250 FOR IP): Performed by: EMERGENCY MEDICINE

## 2025-05-31 PROCEDURE — 99283 EMERGENCY DEPT VISIT LOW MDM: CPT

## 2025-05-31 RX ORDER — DEXAMETHASONE 4 MG/1
4 TABLET ORAL ONCE
Status: COMPLETED | OUTPATIENT
Start: 2025-05-31 | End: 2025-05-31

## 2025-05-31 RX ORDER — LIDOCAINE 4 G/G
2 PATCH TOPICAL DAILY
Qty: 10 EACH | Refills: 0 | Status: SHIPPED | OUTPATIENT
Start: 2025-05-31 | End: 2025-06-05

## 2025-05-31 RX ORDER — LIDOCAINE 4 G/G
2 PATCH TOPICAL ONCE
Status: DISCONTINUED | OUTPATIENT
Start: 2025-05-31 | End: 2025-05-31 | Stop reason: HOSPADM

## 2025-05-31 RX ADMIN — DEXAMETHASONE 4 MG: 4 TABLET ORAL at 10:22

## 2025-05-31 ASSESSMENT — PAIN DESCRIPTION - DESCRIPTORS: DESCRIPTORS: ACHING

## 2025-05-31 ASSESSMENT — PAIN SCALES - GENERAL: PAINLEVEL_OUTOF10: 9

## 2025-05-31 ASSESSMENT — PAIN DESCRIPTION - LOCATION: LOCATION: KNEE

## 2025-05-31 ASSESSMENT — PAIN DESCRIPTION - FREQUENCY: FREQUENCY: CONTINUOUS

## 2025-05-31 ASSESSMENT — PAIN - FUNCTIONAL ASSESSMENT: PAIN_FUNCTIONAL_ASSESSMENT: 0-10

## 2025-05-31 ASSESSMENT — PAIN DESCRIPTION - PAIN TYPE: TYPE: ACUTE PAIN

## 2025-05-31 ASSESSMENT — LIFESTYLE VARIABLES: HOW OFTEN DO YOU HAVE A DRINK CONTAINING ALCOHOL: NEVER

## 2025-05-31 ASSESSMENT — PAIN DESCRIPTION - ORIENTATION: ORIENTATION: POSTERIOR;RIGHT

## 2025-05-31 NOTE — DISCHARGE INSTRUCTIONS
Your prescription has been sent to [email or web address].    Be sure to contact the clinic listed in your paperwork and/or your primary care provider to arrange for a follow up visit.    If you leg does not get better quickly over time I would recommend getting a repeat ultrasound 1-2 weeks from now.    If you have any new or worsening issues after going home don't hesitate to return here for reevaluation at any time 24/7!
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
## (undated) DEVICE — SET EXTN L7IN PRIMING VOL 0.5ML PRSS RATE STD BOR 1 REM

## (undated) DEVICE — Device: Brand: JELCO

## (undated) DEVICE — STERILE POLYISOPRENE POWDER-FREE SURGICAL GLOVES: Brand: PROTEXIS

## (undated) DEVICE — NEEDLE SPNL 22GA L5IN BLK HUB S STL W/ QNCKE PNT W/OUT

## (undated) DEVICE — BW-412T DISP COMBO CLEANING BRUSH: Brand: SINGLE USE COMBINATION CLEANING BRUSH

## (undated) DEVICE — PORT VLV 2 W NDL FREE SMRTSITE

## (undated) DEVICE — PEN: MARKING STD 100/CS: Brand: MEDICAL ACTION INDUSTRIES

## (undated) DEVICE — DISPOSABLE OR TOWEL: Brand: CARDINAL HEALTH

## (undated) DEVICE — SYRINGE MED 3ML CLR PLAS STD N CTRL LUERLOCK TIP DISP

## (undated) DEVICE — FORCEPS BX L240CM WRK CHN 2.8MM STD CAP W/ NDL MIC MESH

## (undated) DEVICE — STANDARD HYPODERMIC NEEDLE,POLYPROPYLENE HUB: Brand: MONOJECT

## (undated) DEVICE — MEDIA CONTRAST RX ISOVUE-300 61% 30ML VIALS

## (undated) DEVICE — UNIVERSAL BLOCK TRAY: Brand: AVANOS*

## (undated) DEVICE — SET VLV 3 PC AWS DISPOSABLE GRDIAN SCOPEVALET

## (undated) DEVICE — CHLORAPREP 26ML ORANGE

## (undated) DEVICE — SOLUTION IV IRRIG WATER 500ML POUR BRL ST 2F7113

## (undated) DEVICE — PROCEDURE KIT ENDOSCP CUST

## (undated) DEVICE — NEEDLE SPNL 22GA L3.5IN BLK HUB S STL REG WALL FIT STYL W/

## (undated) DEVICE — MOUTHPIECE ENDOSCP L CTRL OPN AND SIDE PORTS DISP